# Patient Record
Sex: FEMALE | Race: WHITE | Employment: OTHER | URBAN - METROPOLITAN AREA
[De-identification: names, ages, dates, MRNs, and addresses within clinical notes are randomized per-mention and may not be internally consistent; named-entity substitution may affect disease eponyms.]

---

## 2019-09-30 ENCOUNTER — HOSPITAL ENCOUNTER (EMERGENCY)
Age: 64
Discharge: HOME | End: 2019-09-30
Attending: EMERGENCY MEDICINE
Payer: MEDICARE

## 2019-09-30 ENCOUNTER — APPOINTMENT (OUTPATIENT)
Dept: CT IMAGING | Age: 64
End: 2019-09-30
Payer: MEDICARE

## 2019-09-30 VITALS
SYSTOLIC BLOOD PRESSURE: 120 MMHG | OXYGEN SATURATION: 97 % | DIASTOLIC BLOOD PRESSURE: 60 MMHG | RESPIRATION RATE: 14 BRPM | TEMPERATURE: 98.8 F | HEART RATE: 69 BPM

## 2019-09-30 DIAGNOSIS — N30.01 ACUTE CYSTITIS WITH HEMATURIA: ICD-10-CM

## 2019-09-30 DIAGNOSIS — R10.30 LOWER ABDOMINAL PAIN: Primary | ICD-10-CM

## 2019-09-30 DIAGNOSIS — K57.90 DIVERTICULOSIS: ICD-10-CM

## 2019-09-30 LAB
ALBUMIN SERPL-MCNC: 4.9 G/DL (ref 3.5–5.2)
ALP BLD-CCNC: 95 U/L (ref 35–104)
ALT SERPL-CCNC: 18 U/L (ref 0–32)
AMORPHOUS: ABNORMAL
ANION GAP SERPL CALCULATED.3IONS-SCNC: 11 MMOL/L (ref 7–16)
AST SERPL-CCNC: 20 U/L (ref 0–31)
BACTERIA: ABNORMAL /HPF
BASOPHILS ABSOLUTE: 0.06 E9/L (ref 0–0.2)
BASOPHILS RELATIVE PERCENT: 0.9 % (ref 0–2)
BILIRUB SERPL-MCNC: 0.6 MG/DL (ref 0–1.2)
BILIRUBIN URINE: NEGATIVE
BLOOD, URINE: ABNORMAL
BUN BLDV-MCNC: 17 MG/DL (ref 8–23)
CALCIUM SERPL-MCNC: 10 MG/DL (ref 8.6–10.2)
CHLORIDE BLD-SCNC: 103 MMOL/L (ref 98–107)
CLARITY: CLEAR
CO2: 25 MMOL/L (ref 22–29)
COLOR: YELLOW
CREAT SERPL-MCNC: 0.7 MG/DL (ref 0.5–1)
EOSINOPHILS ABSOLUTE: 0.09 E9/L (ref 0.05–0.5)
EOSINOPHILS RELATIVE PERCENT: 1.4 % (ref 0–6)
EPITHELIAL CELLS, UA: ABNORMAL /HPF
GFR AFRICAN AMERICAN: >60
GFR NON-AFRICAN AMERICAN: >60 ML/MIN/1.73
GLUCOSE BLD-MCNC: 101 MG/DL (ref 74–99)
GLUCOSE URINE: NEGATIVE MG/DL
HCT VFR BLD CALC: 44.2 % (ref 34–48)
HEMOGLOBIN: 14.6 G/DL (ref 11.5–15.5)
IMMATURE GRANULOCYTES #: 0.02 E9/L
IMMATURE GRANULOCYTES %: 0.3 % (ref 0–5)
KETONES, URINE: NEGATIVE MG/DL
LACTIC ACID: 1 MMOL/L (ref 0.5–2.2)
LEUKOCYTE ESTERASE, URINE: ABNORMAL
LIPASE: 20 U/L (ref 13–60)
LYMPHOCYTES ABSOLUTE: 1.58 E9/L (ref 1.5–4)
LYMPHOCYTES RELATIVE PERCENT: 25 % (ref 20–42)
MCH RBC QN AUTO: 29.9 PG (ref 26–35)
MCHC RBC AUTO-ENTMCNC: 33 % (ref 32–34.5)
MCV RBC AUTO: 90.4 FL (ref 80–99.9)
MONOCYTES ABSOLUTE: 0.55 E9/L (ref 0.1–0.95)
MONOCYTES RELATIVE PERCENT: 8.7 % (ref 2–12)
NEUTROPHILS ABSOLUTE: 4.03 E9/L (ref 1.8–7.3)
NEUTROPHILS RELATIVE PERCENT: 63.7 % (ref 43–80)
NITRITE, URINE: NEGATIVE
PDW BLD-RTO: 12.5 FL (ref 11.5–15)
PH UA: 5.5 (ref 5–9)
PLATELET # BLD: 193 E9/L (ref 130–450)
PMV BLD AUTO: 9.6 FL (ref 7–12)
POTASSIUM SERPL-SCNC: 4.1 MMOL/L (ref 3.5–5)
PROTEIN UA: NEGATIVE MG/DL
RBC # BLD: 4.89 E12/L (ref 3.5–5.5)
RBC UA: ABNORMAL /HPF (ref 0–2)
RENAL EPITHELIAL, UA: ABNORMAL /HPF
SODIUM BLD-SCNC: 139 MMOL/L (ref 132–146)
SPECIFIC GRAVITY UA: <=1.005 (ref 1–1.03)
TOTAL PROTEIN: 7.9 G/DL (ref 6.4–8.3)
UROBILINOGEN, URINE: 0.2 E.U./DL
WBC # BLD: 6.3 E9/L (ref 4.5–11.5)
WBC UA: ABNORMAL /HPF (ref 0–5)

## 2019-09-30 PROCEDURE — 6370000000 HC RX 637 (ALT 250 FOR IP): Performed by: PHYSICIAN ASSISTANT

## 2019-09-30 PROCEDURE — 74177 CT ABD & PELVIS W/CONTRAST: CPT

## 2019-09-30 PROCEDURE — 83605 ASSAY OF LACTIC ACID: CPT

## 2019-09-30 PROCEDURE — 87077 CULTURE AEROBIC IDENTIFY: CPT

## 2019-09-30 PROCEDURE — 99284 EMERGENCY DEPT VISIT MOD MDM: CPT

## 2019-09-30 PROCEDURE — 87186 SC STD MICRODIL/AGAR DIL: CPT

## 2019-09-30 PROCEDURE — 6360000004 HC RX CONTRAST MEDICATION: Performed by: RADIOLOGY

## 2019-09-30 PROCEDURE — 80053 COMPREHEN METABOLIC PANEL: CPT

## 2019-09-30 PROCEDURE — 2580000003 HC RX 258: Performed by: RADIOLOGY

## 2019-09-30 PROCEDURE — 83690 ASSAY OF LIPASE: CPT

## 2019-09-30 PROCEDURE — 85025 COMPLETE CBC W/AUTO DIFF WBC: CPT

## 2019-09-30 PROCEDURE — 81001 URINALYSIS AUTO W/SCOPE: CPT

## 2019-09-30 PROCEDURE — 87088 URINE BACTERIA CULTURE: CPT

## 2019-09-30 RX ORDER — SODIUM CHLORIDE 0.9 % (FLUSH) 0.9 %
10 SYRINGE (ML) INJECTION PRN
Status: COMPLETED | OUTPATIENT
Start: 2019-09-30 | End: 2019-09-30

## 2019-09-30 RX ORDER — CEPHALEXIN 500 MG/1
500 CAPSULE ORAL 3 TIMES DAILY
Qty: 21 CAPSULE | Refills: 0 | Status: SHIPPED | OUTPATIENT
Start: 2019-09-30 | End: 2019-10-07

## 2019-09-30 RX ORDER — KETOROLAC TROMETHAMINE 30 MG/ML
15 INJECTION, SOLUTION INTRAMUSCULAR; INTRAVENOUS ONCE
Status: DISCONTINUED | OUTPATIENT
Start: 2019-09-30 | End: 2019-09-30

## 2019-09-30 RX ORDER — METRONIDAZOLE 500 MG/1
500 TABLET ORAL 4 TIMES DAILY
Qty: 40 TABLET | Refills: 0 | Status: SHIPPED | OUTPATIENT
Start: 2019-09-30 | End: 2019-10-10

## 2019-09-30 RX ORDER — IBUPROFEN 800 MG/1
800 TABLET ORAL ONCE
Status: COMPLETED | OUTPATIENT
Start: 2019-09-30 | End: 2019-09-30

## 2019-09-30 RX ORDER — ONDANSETRON 4 MG/1
4 TABLET, ORALLY DISINTEGRATING ORAL EVERY 8 HOURS PRN
Qty: 20 TABLET | Refills: 0 | Status: SHIPPED | OUTPATIENT
Start: 2019-09-30 | End: 2019-10-07

## 2019-09-30 RX ADMIN — IBUPROFEN 800 MG: 800 TABLET, FILM COATED ORAL at 22:33

## 2019-09-30 RX ADMIN — IOPAMIDOL 110 ML: 755 INJECTION, SOLUTION INTRAVENOUS at 21:55

## 2019-09-30 RX ADMIN — Medication 10 ML: at 21:51

## 2019-09-30 ASSESSMENT — PAIN DESCRIPTION - LOCATION: LOCATION: ABDOMEN

## 2019-09-30 ASSESSMENT — PAIN SCALES - GENERAL
PAINLEVEL_OUTOF10: 7
PAINLEVEL_OUTOF10: 5

## 2019-09-30 ASSESSMENT — PAIN DESCRIPTION - PAIN TYPE: TYPE: ACUTE PAIN

## 2019-09-30 ASSESSMENT — PAIN DESCRIPTION - ORIENTATION: ORIENTATION: LEFT;LOWER

## 2019-10-02 LAB
ORGANISM: ABNORMAL
URINE CULTURE, ROUTINE: ABNORMAL

## 2019-10-29 ENCOUNTER — HOSPITAL ENCOUNTER (OUTPATIENT)
Age: 64
Discharge: HOME | End: 2019-10-31
Payer: MEDICARE

## 2019-10-29 PROCEDURE — 87077 CULTURE AEROBIC IDENTIFY: CPT

## 2019-10-29 PROCEDURE — 87186 SC STD MICRODIL/AGAR DIL: CPT

## 2019-10-29 PROCEDURE — 87088 URINE BACTERIA CULTURE: CPT

## 2019-10-30 ENCOUNTER — HOSPITAL ENCOUNTER (OUTPATIENT)
Age: 64
Discharge: HOME | End: 2019-11-01
Payer: MEDICARE

## 2019-10-30 PROCEDURE — 88112 CYTOPATH CELL ENHANCE TECH: CPT

## 2019-11-01 LAB
ORGANISM: ABNORMAL
URINE CULTURE, ROUTINE: ABNORMAL

## 2020-04-13 ENCOUNTER — HOSPITAL ENCOUNTER (OUTPATIENT)
Age: 65
Discharge: HOME | End: 2020-04-15
Payer: MEDICARE

## 2020-04-13 PROCEDURE — 87186 SC STD MICRODIL/AGAR DIL: CPT

## 2020-04-13 PROCEDURE — 87088 URINE BACTERIA CULTURE: CPT

## 2020-04-13 PROCEDURE — 87147 CULTURE TYPE IMMUNOLOGIC: CPT

## 2020-04-15 ENCOUNTER — HOSPITAL ENCOUNTER (EMERGENCY)
Age: 65
Discharge: HOME | End: 2020-04-15
Attending: EMERGENCY MEDICINE
Payer: MEDICARE

## 2020-04-15 VITALS
HEART RATE: 85 BPM | RESPIRATION RATE: 16 BRPM | TEMPERATURE: 98.8 F | BODY MASS INDEX: 23.95 KG/M2 | SYSTOLIC BLOOD PRESSURE: 156 MMHG | OXYGEN SATURATION: 98 % | DIASTOLIC BLOOD PRESSURE: 81 MMHG | WEIGHT: 149 LBS | HEIGHT: 66 IN

## 2020-04-15 LAB
ORGANISM: ABNORMAL
URINE CULTURE, ROUTINE: ABNORMAL
URINE CULTURE, ROUTINE: ABNORMAL

## 2020-04-15 PROCEDURE — 99283 EMERGENCY DEPT VISIT LOW MDM: CPT

## 2020-04-15 RX ORDER — AMOXICILLIN 500 MG/1
500 CAPSULE ORAL 3 TIMES DAILY
Qty: 21 CAPSULE | Refills: 0 | Status: SHIPPED | OUTPATIENT
Start: 2020-04-15 | End: 2020-04-22

## 2020-04-15 ASSESSMENT — ENCOUNTER SYMPTOMS
VOMITING: 0
COUGH: 0
SHORTNESS OF BREATH: 0
WHEEZING: 0
ABDOMINAL PAIN: 1
NAUSEA: 0
DIARRHEA: 0
CONSTIPATION: 0
COLOR CHANGE: 0

## 2020-04-15 NOTE — ED PROVIDER NOTES
distress. Appearance: She is well-developed. She is not diaphoretic. HENT:      Head: Normocephalic and atraumatic. Right Ear: External ear normal.      Left Ear: External ear normal.      Mouth/Throat:      Pharynx: No oropharyngeal exudate. Eyes:      Pupils: Pupils are equal, round, and reactive to light. Neck:      Musculoskeletal: Normal range of motion. Cardiovascular:      Rate and Rhythm: Normal rate and regular rhythm. Heart sounds: Normal heart sounds. No murmur. No friction rub. No gallop. Pulmonary:      Effort: Pulmonary effort is normal. No respiratory distress. Breath sounds: Normal breath sounds. No wheezing or rales. Chest:      Chest wall: No tenderness. Abdominal:      General: Bowel sounds are normal. There is no distension. Palpations: Abdomen is soft. There is no mass. Tenderness: There is no abdominal tenderness. There is no guarding or rebound. Hernia: No hernia is present. Musculoskeletal: Normal range of motion. General: No tenderness or deformity. Lymphadenopathy:      Cervical: No cervical adenopathy. Skin:     General: Skin is warm and dry. Capillary Refill: Capillary refill takes less than 2 seconds. Coloration: Skin is not pale. Findings: No erythema or rash. Neurological:      Mental Status: She is alert and oriented to person, place, and time. Cranial Nerves: No cranial nerve deficit. Psychiatric:         Behavior: Behavior normal.         Thought Content: Thought content normal.         Judgment: Judgment normal.          Procedures   --------------------------------------------- PAST HISTORY ---------------------------------------------  Past Medical History:  has a past medical history of Acid reflux, Allergic rhinitis, Asthma, Diverticulosis, Pinched nerve in neck, Staph aureus infection, and UTI (urinary tract infection).     Past Surgical History:  has a past surgical history that includes [KS]   4616 Discussed with Dr. Brian Zhao, who states that if patient is asymptomatic, that she does not need to be treated with abx, but if she is symptomatic to treat with amoxicillin. No need for a PICC line or additional labs if patient is not hypotensive or tachycardic. Patient to follow up with PCP and does not need additional ID consult at this time.      [KS]      ED Course User Index  [KS] Ale Snyder, [address] [facility],   Resident  04/15/20 [place] [address], DO  Resident  04/15/20 0016

## 2020-04-27 ENCOUNTER — APPOINTMENT (OUTPATIENT)
Dept: CT IMAGING | Age: 65
End: 2020-04-27
Payer: MEDICARE

## 2020-04-27 ENCOUNTER — HOSPITAL ENCOUNTER (EMERGENCY)
Age: 65
Discharge: HOME | End: 2020-04-27
Attending: EMERGENCY MEDICINE
Payer: MEDICARE

## 2020-04-27 VITALS
BODY MASS INDEX: 23.63 KG/M2 | RESPIRATION RATE: 16 BRPM | HEIGHT: 66 IN | TEMPERATURE: 98.3 F | SYSTOLIC BLOOD PRESSURE: 138 MMHG | DIASTOLIC BLOOD PRESSURE: 73 MMHG | HEART RATE: 70 BPM | WEIGHT: 147 LBS | OXYGEN SATURATION: 95 %

## 2020-04-27 LAB
ALBUMIN SERPL-MCNC: 4.6 G/DL (ref 3.5–5.2)
ALP BLD-CCNC: 95 U/L (ref 35–104)
ALT SERPL-CCNC: 25 U/L (ref 0–32)
ANION GAP SERPL CALCULATED.3IONS-SCNC: 13 MMOL/L (ref 7–16)
AST SERPL-CCNC: 24 U/L (ref 0–31)
BACTERIA: ABNORMAL /HPF
BASOPHILS ABSOLUTE: 0.06 E9/L (ref 0–0.2)
BASOPHILS RELATIVE PERCENT: 0.7 % (ref 0–2)
BILIRUB SERPL-MCNC: 0.4 MG/DL (ref 0–1.2)
BILIRUBIN URINE: NEGATIVE
BLOOD, URINE: NEGATIVE
BUN BLDV-MCNC: 16 MG/DL (ref 8–23)
CALCIUM SERPL-MCNC: 9.8 MG/DL (ref 8.6–10.2)
CHLORIDE BLD-SCNC: 101 MMOL/L (ref 98–107)
CLARITY: CLEAR
CO2: 24 MMOL/L (ref 22–29)
COLOR: YELLOW
CREAT SERPL-MCNC: 0.7 MG/DL (ref 0.5–1)
EOSINOPHILS ABSOLUTE: 0.1 E9/L (ref 0.05–0.5)
EOSINOPHILS RELATIVE PERCENT: 1.2 % (ref 0–6)
EPITHELIAL CELLS, UA: ABNORMAL /HPF
GFR AFRICAN AMERICAN: >60
GFR NON-AFRICAN AMERICAN: >60 ML/MIN/1.73
GLUCOSE BLD-MCNC: 97 MG/DL (ref 74–99)
GLUCOSE URINE: NEGATIVE MG/DL
HCT VFR BLD CALC: 44.9 % (ref 34–48)
HEMOGLOBIN: 14.9 G/DL (ref 11.5–15.5)
IMMATURE GRANULOCYTES #: 0.02 E9/L
IMMATURE GRANULOCYTES %: 0.2 % (ref 0–5)
KETONES, URINE: NEGATIVE MG/DL
LACTIC ACID, SEPSIS: 1.3 MMOL/L (ref 0.5–1.9)
LEUKOCYTE ESTERASE, URINE: ABNORMAL
LIPASE: 22 U/L (ref 13–60)
LYMPHOCYTES ABSOLUTE: 1.6 E9/L (ref 1.5–4)
LYMPHOCYTES RELATIVE PERCENT: 18.8 % (ref 20–42)
MCH RBC QN AUTO: 30 PG (ref 26–35)
MCHC RBC AUTO-ENTMCNC: 33.2 % (ref 32–34.5)
MCV RBC AUTO: 90.3 FL (ref 80–99.9)
MONOCYTES ABSOLUTE: 0.66 E9/L (ref 0.1–0.95)
MONOCYTES RELATIVE PERCENT: 7.7 % (ref 2–12)
NEUTROPHILS ABSOLUTE: 6.09 E9/L (ref 1.8–7.3)
NEUTROPHILS RELATIVE PERCENT: 71.4 % (ref 43–80)
NITRITE, URINE: NEGATIVE
PDW BLD-RTO: 12.8 FL (ref 11.5–15)
PH UA: 5.5 (ref 5–9)
PLATELET # BLD: 257 E9/L (ref 130–450)
PMV BLD AUTO: 9.6 FL (ref 7–12)
POTASSIUM SERPL-SCNC: 4.6 MMOL/L (ref 3.5–5)
PROTEIN UA: NEGATIVE MG/DL
RBC # BLD: 4.97 E12/L (ref 3.5–5.5)
RBC UA: ABNORMAL /HPF (ref 0–2)
SODIUM BLD-SCNC: 138 MMOL/L (ref 132–146)
SPECIFIC GRAVITY UA: <=1.005 (ref 1–1.03)
TOTAL PROTEIN: 7.8 G/DL (ref 6.4–8.3)
UROBILINOGEN, URINE: 0.2 E.U./DL
WBC # BLD: 8.5 E9/L (ref 4.5–11.5)
WBC UA: ABNORMAL /HPF (ref 0–5)

## 2020-04-27 PROCEDURE — 87186 SC STD MICRODIL/AGAR DIL: CPT

## 2020-04-27 PROCEDURE — 6360000002 HC RX W HCPCS

## 2020-04-27 PROCEDURE — 99284 EMERGENCY DEPT VISIT MOD MDM: CPT

## 2020-04-27 PROCEDURE — 96374 THER/PROPH/DIAG INJ IV PUSH: CPT

## 2020-04-27 PROCEDURE — 83690 ASSAY OF LIPASE: CPT

## 2020-04-27 PROCEDURE — 81001 URINALYSIS AUTO W/SCOPE: CPT

## 2020-04-27 PROCEDURE — 6360000004 HC RX CONTRAST MEDICATION: Performed by: RADIOLOGY

## 2020-04-27 PROCEDURE — 87077 CULTURE AEROBIC IDENTIFY: CPT

## 2020-04-27 PROCEDURE — 87040 BLOOD CULTURE FOR BACTERIA: CPT

## 2020-04-27 PROCEDURE — 2580000003 HC RX 258: Performed by: STUDENT IN AN ORGANIZED HEALTH CARE EDUCATION/TRAINING PROGRAM

## 2020-04-27 PROCEDURE — 83605 ASSAY OF LACTIC ACID: CPT

## 2020-04-27 PROCEDURE — 87147 CULTURE TYPE IMMUNOLOGIC: CPT

## 2020-04-27 PROCEDURE — 6370000000 HC RX 637 (ALT 250 FOR IP): Performed by: EMERGENCY MEDICINE

## 2020-04-27 PROCEDURE — 85025 COMPLETE CBC W/AUTO DIFF WBC: CPT

## 2020-04-27 PROCEDURE — 87088 URINE BACTERIA CULTURE: CPT

## 2020-04-27 PROCEDURE — 74177 CT ABD & PELVIS W/CONTRAST: CPT

## 2020-04-27 PROCEDURE — 80053 COMPREHEN METABOLIC PANEL: CPT

## 2020-04-27 RX ORDER — CEFDINIR 300 MG/1
300 CAPSULE ORAL ONCE
Status: COMPLETED | OUTPATIENT
Start: 2020-04-27 | End: 2020-04-27

## 2020-04-27 RX ORDER — KETOROLAC TROMETHAMINE 30 MG/ML
15 INJECTION, SOLUTION INTRAMUSCULAR; INTRAVENOUS ONCE
Status: COMPLETED | OUTPATIENT
Start: 2020-04-27 | End: 2020-04-27

## 2020-04-27 RX ORDER — 0.9 % SODIUM CHLORIDE 0.9 %
1000 INTRAVENOUS SOLUTION INTRAVENOUS ONCE
Status: COMPLETED | OUTPATIENT
Start: 2020-04-27 | End: 2020-04-27

## 2020-04-27 RX ORDER — KETOROLAC TROMETHAMINE 30 MG/ML
INJECTION, SOLUTION INTRAMUSCULAR; INTRAVENOUS
Status: COMPLETED
Start: 2020-04-27 | End: 2020-04-27

## 2020-04-27 RX ORDER — CEFDINIR 300 MG/1
300 CAPSULE ORAL 2 TIMES DAILY
Qty: 20 CAPSULE | Refills: 0 | Status: ON HOLD | OUTPATIENT
Start: 2020-04-27 | End: 2020-05-04 | Stop reason: HOSPADM

## 2020-04-27 RX ORDER — FENTANYL CITRATE 50 UG/ML
50 INJECTION, SOLUTION INTRAMUSCULAR; INTRAVENOUS ONCE
Status: DISCONTINUED | OUTPATIENT
Start: 2020-04-27 | End: 2020-04-27

## 2020-04-27 RX ADMIN — IOPAMIDOL 110 ML: 755 INJECTION, SOLUTION INTRAVENOUS at 13:52

## 2020-04-27 RX ADMIN — KETOROLAC TROMETHAMINE 15 MG: 30 INJECTION, SOLUTION INTRAMUSCULAR; INTRAVENOUS at 14:51

## 2020-04-27 RX ADMIN — CEFDINIR 300 MG: 300 CAPSULE ORAL at 16:12

## 2020-04-27 RX ADMIN — KETOROLAC TROMETHAMINE 15 MG: 30 INJECTION, SOLUTION INTRAMUSCULAR at 14:51

## 2020-04-27 RX ADMIN — SODIUM CHLORIDE 1000 ML: 9 INJECTION, SOLUTION INTRAVENOUS at 12:41

## 2020-04-27 ASSESSMENT — PAIN DESCRIPTION - PAIN TYPE: TYPE: ACUTE PAIN

## 2020-04-27 ASSESSMENT — ENCOUNTER SYMPTOMS
DIARRHEA: 0
COUGH: 0
CONSTIPATION: 0
BACK PAIN: 0
RHINORRHEA: 0
WHEEZING: 0
VOMITING: 0
CHEST TIGHTNESS: 0
ABDOMINAL PAIN: 1
SHORTNESS OF BREATH: 0
BLOOD IN STOOL: 0
NAUSEA: 0
SORE THROAT: 0

## 2020-04-27 ASSESSMENT — PAIN SCALES - GENERAL
PAINLEVEL_OUTOF10: 7
PAINLEVEL_OUTOF10: 6
PAINLEVEL_OUTOF10: 8

## 2020-04-27 ASSESSMENT — PAIN DESCRIPTION - LOCATION: LOCATION: ABDOMEN

## 2020-04-27 NOTE — ED PROVIDER NOTES
appearance. She is normal weight. She is not ill-appearing, toxic-appearing or diaphoretic. HENT:      Head: Normocephalic and atraumatic. Right Ear: External ear normal.      Left Ear: External ear normal.      Nose: Nose normal. No congestion or rhinorrhea. Mouth/Throat:      Mouth: Mucous membranes are moist.      Pharynx: No oropharyngeal exudate or posterior oropharyngeal erythema. Eyes:      General: No scleral icterus. Right eye: No discharge. Left eye: No discharge. Extraocular Movements: Extraocular movements intact. Pupils: Pupils are equal, round, and reactive to light. Neck:      Musculoskeletal: Normal range of motion and neck supple. No neck rigidity or muscular tenderness. Vascular: No carotid bruit. Cardiovascular:      Rate and Rhythm: Normal rate and regular rhythm. Pulses: Normal pulses. Heart sounds: Normal heart sounds. No murmur. No friction rub. No gallop. Pulmonary:      Effort: Pulmonary effort is normal. No respiratory distress. Breath sounds: Normal breath sounds. No stridor. No wheezing, rhonchi or rales. Chest:      Chest wall: No tenderness. Abdominal:      General: Abdomen is flat. There is no distension. Palpations: Abdomen is soft. There is no mass. Tenderness: There is abdominal tenderness. There is no right CVA tenderness, left CVA tenderness, guarding or rebound. Hernia: No hernia is present. Comments: Left lower quadrant tenderness on palpation. Suprapubic tenderness on palpation. Epigastric tenderness is mild. Musculoskeletal: Normal range of motion. General: No swelling, tenderness, deformity or signs of injury. Right lower leg: No edema. Left lower leg: No edema. Lymphadenopathy:      Cervical: No cervical adenopathy. Skin:     General: Skin is warm. Capillary Refill: Capillary refill takes less than 2 seconds. Findings: No rash.    Neurological: the hospital encounter of 04/27/20   CBC Auto Differential   Result Value Ref Range    WBC 8.5 4.5 - 11.5 E9/L    RBC 4.97 3.50 - 5.50 E12/L    Hemoglobin 14.9 11.5 - 15.5 g/dL    Hematocrit 44.9 34.0 - 48.0 %    MCV 90.3 80.0 - 99.9 fL    MCH 30.0 26.0 - 35.0 pg    MCHC 33.2 32.0 - 34.5 %    RDW 12.8 11.5 - 15.0 fL    Platelets 713 024 - 621 E9/L    MPV 9.6 7.0 - 12.0 fL    Neutrophils % 71.4 43.0 - 80.0 %    Immature Granulocytes % 0.2 0.0 - 5.0 %    Lymphocytes % 18.8 (L) 20.0 - 42.0 %    Monocytes % 7.7 2.0 - 12.0 %    Eosinophils % 1.2 0.0 - 6.0 %    Basophils % 0.7 0.0 - 2.0 %    Neutrophils Absolute 6.09 1.80 - 7.30 E9/L    Immature Granulocytes # 0.02 E9/L    Lymphocytes Absolute 1.60 1.50 - 4.00 E9/L    Monocytes Absolute 0.66 0.10 - 0.95 E9/L    Eosinophils Absolute 0.10 0.05 - 0.50 E9/L    Basophils Absolute 0.06 0.00 - 0.20 E9/L   Comprehensive Metabolic Panel   Result Value Ref Range    Sodium 138 132 - 146 mmol/L    Potassium 4.6 3.5 - 5.0 mmol/L    Chloride 101 98 - 107 mmol/L    CO2 24 22 - 29 mmol/L    Anion Gap 13 7 - 16 mmol/L    Glucose 97 74 - 99 mg/dL    BUN 16 8 - 23 mg/dL    CREATININE 0.7 0.5 - 1.0 mg/dL    GFR Non-African American >60 >=60 mL/min/1.73    GFR African American >60     Calcium 9.8 8.6 - 10.2 mg/dL    Total Protein 7.8 6.4 - 8.3 g/dL    Alb 4.6 3.5 - 5.2 g/dL    Total Bilirubin 0.4 0.0 - 1.2 mg/dL    Alkaline Phosphatase 95 35 - 104 U/L    ALT 25 0 - 32 U/L    AST 24 0 - 31 U/L   Urinalysis   Result Value Ref Range    Color, UA Yellow Straw/Yellow    Clarity, UA Clear Clear    Glucose, Ur Negative Negative mg/dL    Bilirubin Urine Negative Negative    Ketones, Urine Negative Negative mg/dL    Specific Gravity, UA <=1.005 1.005 - 1.030    Blood, Urine Negative Negative    pH, UA 5.5 5.0 - 9.0    Protein, UA Negative Negative mg/dL    Urobilinogen, Urine 0.2 <2.0 E.U./dL    Nitrite, Urine Negative Negative    Leukocyte Esterase, Urine LARGE (A) Negative   Lactate, Sepsis   Result Value Ref Range    Lactic Acid, Sepsis 1.3 0.5 - 1.9 mmol/L   Lipase   Result Value Ref Range    Lipase 22 13 - 60 U/L   Microscopic Urinalysis   Result Value Ref Range    WBC, UA 10-20 (A) 0 - 5 /HPF    RBC, UA 1-3 0 - 2 /HPF    Epithelial Cells, UA FEW /HPF    Bacteria, UA FEW (A) None Seen /HPF       Radiology:  CT ABDOMEN PELVIS W IV CONTRAST Additional Contrast? None   Final Result   There are multiple diverticula seen    Findings compatible with fatty infiltration of the liver                                        ------------------------- NURSING NOTES AND VITALS REVIEWED ---------------------------  Date / Time Roomed:  4/27/2020 11:43 AM  ED Bed Assignment:  25/25    The nursing notes within the ED encounter and vital signs as below have been reviewed. /73   Pulse 70   Temp 98.3 °F (36.8 °C) (Oral)   Resp 16   Ht 5' 6\" (1.676 m)   Wt 147 lb (66.7 kg)   SpO2 95%   BMI 23.73 kg/m²   Oxygen Saturation Interpretation: Normal      ------------------------------------------ PROGRESS NOTES ------------------------------------------  I have spoken with the patient and discussed todays results, in addition to providing specific details for the plan of care and counseling regarding the diagnosis and prognosis. Their questions are answered at this time and they are agreeable with the plan. I discussed at length with them reasons for immediate return here for re evaluation. They will followup with primary care by calling their office tomorrow. --------------------------------- ADDITIONAL PROVIDER NOTES ---------------------------------  At this time the patient is without objective evidence of an acute process requiring hospitalization or inpatient management. They have remained hemodynamically stable throughout their entire ED visit and are stable for discharge with outpatient follow-up. Discussed imaging and laboratories with the patient. Imaging is negative for acute pathology.   Does

## 2020-04-30 LAB
ORGANISM: ABNORMAL
ORGANISM: ABNORMAL
URINE CULTURE, ROUTINE: ABNORMAL
URINE CULTURE, ROUTINE: ABNORMAL

## 2020-05-02 ENCOUNTER — HOSPITAL ENCOUNTER (INPATIENT)
Age: 65
LOS: 2 days | Discharge: HOME | DRG: 690 | End: 2020-05-04
Attending: EMERGENCY MEDICINE | Admitting: INTERNAL MEDICINE
Payer: MEDICARE

## 2020-05-02 ENCOUNTER — APPOINTMENT (OUTPATIENT)
Dept: GENERAL RADIOLOGY | Age: 65
DRG: 690 | End: 2020-05-02
Payer: MEDICARE

## 2020-05-02 PROBLEM — E87.20 LACTIC ACIDOSIS: Status: ACTIVE | Noted: 2020-05-02

## 2020-05-02 PROBLEM — Z87.19 HISTORY OF GASTROESOPHAGEAL REFLUX (GERD): Chronic | Status: ACTIVE | Noted: 2020-05-02

## 2020-05-02 PROBLEM — N39.0 UTI (URINARY TRACT INFECTION): Status: ACTIVE | Noted: 2020-05-02

## 2020-05-02 PROBLEM — E87.20 METABOLIC ACIDOSIS, NORMAL ANION GAP (NAG): Status: ACTIVE | Noted: 2020-05-02

## 2020-05-02 PROBLEM — K57.90 DIVERTICULOSIS: Chronic | Status: ACTIVE | Noted: 2020-05-02

## 2020-05-02 PROBLEM — J45.909 EXTRINSIC ASTHMA WITHOUT COMPLICATION: Chronic | Status: ACTIVE | Noted: 2020-05-02

## 2020-05-02 LAB
ALBUMIN SERPL-MCNC: 4.4 G/DL (ref 3.5–5.2)
ALP BLD-CCNC: 89 U/L (ref 35–104)
ALT SERPL-CCNC: 22 U/L (ref 0–32)
ANION GAP SERPL CALCULATED.3IONS-SCNC: 14 MMOL/L (ref 7–16)
AST SERPL-CCNC: 24 U/L (ref 0–31)
BACTERIA: ABNORMAL /HPF
BASOPHILS ABSOLUTE: 0.04 E9/L (ref 0–0.2)
BASOPHILS RELATIVE PERCENT: 0.6 % (ref 0–2)
BILIRUB SERPL-MCNC: 0.6 MG/DL (ref 0–1.2)
BILIRUBIN URINE: NEGATIVE
BLOOD CULTURE, ROUTINE: NORMAL
BLOOD, URINE: NEGATIVE
BUN BLDV-MCNC: 16 MG/DL (ref 8–23)
CALCIUM SERPL-MCNC: 9.7 MG/DL (ref 8.6–10.2)
CHLORIDE BLD-SCNC: 99 MMOL/L (ref 98–107)
CLARITY: CLEAR
CO2: 21 MMOL/L (ref 22–29)
COLOR: YELLOW
CREAT SERPL-MCNC: 0.7 MG/DL (ref 0.5–1)
CULTURE, BLOOD 2: NORMAL
EKG ATRIAL RATE: 73 BPM
EKG P-R INTERVAL: 168 MS
EKG Q-T INTERVAL: 366 MS
EKG QRS DURATION: 82 MS
EKG QTC CALCULATION (BAZETT): 403 MS
EKG R AXIS: 134 DEGREES
EKG T AXIS: 152 DEGREES
EKG VENTRICULAR RATE: 73 BPM
EOSINOPHILS ABSOLUTE: 0.07 E9/L (ref 0.05–0.5)
EOSINOPHILS RELATIVE PERCENT: 1.1 % (ref 0–6)
EPITHELIAL CELLS, UA: ABNORMAL /HPF
GFR AFRICAN AMERICAN: >60
GFR NON-AFRICAN AMERICAN: >60 ML/MIN/1.73
GLUCOSE BLD-MCNC: 95 MG/DL (ref 74–99)
GLUCOSE URINE: NEGATIVE MG/DL
HCT VFR BLD CALC: 43.9 % (ref 34–48)
HEMOGLOBIN: 14.8 G/DL (ref 11.5–15.5)
IMMATURE GRANULOCYTES #: 0.02 E9/L
IMMATURE GRANULOCYTES %: 0.3 % (ref 0–5)
KETONES, URINE: NEGATIVE MG/DL
LACTIC ACID, SEPSIS: 1.2 MMOL/L (ref 0.5–1.9)
LACTIC ACID, SEPSIS: 3 MMOL/L (ref 0.5–1.9)
LEUKOCYTE ESTERASE, URINE: ABNORMAL
LYMPHOCYTES ABSOLUTE: 1.23 E9/L (ref 1.5–4)
LYMPHOCYTES RELATIVE PERCENT: 19.6 % (ref 20–42)
MCH RBC QN AUTO: 30 PG (ref 26–35)
MCHC RBC AUTO-ENTMCNC: 33.7 % (ref 32–34.5)
MCV RBC AUTO: 88.9 FL (ref 80–99.9)
MONOCYTES ABSOLUTE: 0.55 E9/L (ref 0.1–0.95)
MONOCYTES RELATIVE PERCENT: 8.8 % (ref 2–12)
NEUTROPHILS ABSOLUTE: 4.35 E9/L (ref 1.8–7.3)
NEUTROPHILS RELATIVE PERCENT: 69.6 % (ref 43–80)
NITRITE, URINE: NEGATIVE
PDW BLD-RTO: 12.7 FL (ref 11.5–15)
PH UA: 6.5 (ref 5–9)
PLATELET # BLD: 230 E9/L (ref 130–450)
PMV BLD AUTO: 9.5 FL (ref 7–12)
POTASSIUM SERPL-SCNC: 4.4 MMOL/L (ref 3.5–5)
PROTEIN UA: NEGATIVE MG/DL
RBC # BLD: 4.94 E12/L (ref 3.5–5.5)
RBC UA: ABNORMAL /HPF (ref 0–2)
RENAL EPITHELIAL, UA: ABNORMAL /HPF
SODIUM BLD-SCNC: 134 MMOL/L (ref 132–146)
SPECIFIC GRAVITY UA: <=1.005 (ref 1–1.03)
TOTAL PROTEIN: 7.6 G/DL (ref 6.4–8.3)
UROBILINOGEN, URINE: 0.2 E.U./DL
WBC # BLD: 6.3 E9/L (ref 4.5–11.5)
WBC UA: ABNORMAL /HPF (ref 0–5)

## 2020-05-02 PROCEDURE — 2580000003 HC RX 258: Performed by: INTERNAL MEDICINE

## 2020-05-02 PROCEDURE — 93010 ELECTROCARDIOGRAM REPORT: CPT | Performed by: INTERNAL MEDICINE

## 2020-05-02 PROCEDURE — 81001 URINALYSIS AUTO W/SCOPE: CPT

## 2020-05-02 PROCEDURE — 93005 ELECTROCARDIOGRAM TRACING: CPT | Performed by: EMERGENCY MEDICINE

## 2020-05-02 PROCEDURE — 6370000000 HC RX 637 (ALT 250 FOR IP): Performed by: INTERNAL MEDICINE

## 2020-05-02 PROCEDURE — 83605 ASSAY OF LACTIC ACID: CPT

## 2020-05-02 PROCEDURE — 80053 COMPREHEN METABOLIC PANEL: CPT

## 2020-05-02 PROCEDURE — 99285 EMERGENCY DEPT VISIT HI MDM: CPT

## 2020-05-02 PROCEDURE — 1200000000 HC SEMI PRIVATE

## 2020-05-02 PROCEDURE — 71045 X-RAY EXAM CHEST 1 VIEW: CPT

## 2020-05-02 PROCEDURE — 85025 COMPLETE CBC W/AUTO DIFF WBC: CPT

## 2020-05-02 PROCEDURE — 87088 URINE BACTERIA CULTURE: CPT

## 2020-05-02 PROCEDURE — 2580000003 HC RX 258: Performed by: EMERGENCY MEDICINE

## 2020-05-02 PROCEDURE — 6360000002 HC RX W HCPCS: Performed by: EMERGENCY MEDICINE

## 2020-05-02 PROCEDURE — 87040 BLOOD CULTURE FOR BACTERIA: CPT

## 2020-05-02 RX ORDER — ONDANSETRON 2 MG/ML
4 INJECTION INTRAMUSCULAR; INTRAVENOUS EVERY 6 HOURS PRN
Status: DISCONTINUED | OUTPATIENT
Start: 2020-05-02 | End: 2020-05-04 | Stop reason: HOSPADM

## 2020-05-02 RX ORDER — 0.9 % SODIUM CHLORIDE 0.9 %
1000 INTRAVENOUS SOLUTION INTRAVENOUS ONCE
Status: COMPLETED | OUTPATIENT
Start: 2020-05-02 | End: 2020-05-02

## 2020-05-02 RX ORDER — M-VIT,TX,IRON,MINS/CALC/FOLIC 27MG-0.4MG
1 TABLET ORAL DAILY
Status: DISCONTINUED | OUTPATIENT
Start: 2020-05-02 | End: 2020-05-04 | Stop reason: HOSPADM

## 2020-05-02 RX ORDER — ASPIRIN 81 MG/1
81 TABLET ORAL EVERY OTHER DAY
Status: DISCONTINUED | OUTPATIENT
Start: 2020-05-02 | End: 2020-05-04 | Stop reason: HOSPADM

## 2020-05-02 RX ORDER — POLYETHYLENE GLYCOL 3350 17 G/17G
17 POWDER, FOR SOLUTION ORAL DAILY PRN
Status: DISCONTINUED | OUTPATIENT
Start: 2020-05-02 | End: 2020-05-04 | Stop reason: HOSPADM

## 2020-05-02 RX ORDER — PROMETHAZINE HYDROCHLORIDE 25 MG/1
12.5 TABLET ORAL EVERY 6 HOURS PRN
Status: DISCONTINUED | OUTPATIENT
Start: 2020-05-02 | End: 2020-05-04 | Stop reason: HOSPADM

## 2020-05-02 RX ORDER — LORAZEPAM 0.5 MG/1
0.5 TABLET ORAL NIGHTLY PRN
Status: DISCONTINUED | OUTPATIENT
Start: 2020-05-02 | End: 2020-05-04 | Stop reason: HOSPADM

## 2020-05-02 RX ORDER — SPIRONOLACTONE 25 MG/1
25 TABLET ORAL DAILY
Status: DISCONTINUED | OUTPATIENT
Start: 2020-05-02 | End: 2020-05-04 | Stop reason: HOSPADM

## 2020-05-02 RX ORDER — SODIUM CHLORIDE 9 MG/ML
INJECTION, SOLUTION INTRAVENOUS CONTINUOUS
Status: DISCONTINUED | OUTPATIENT
Start: 2020-05-02 | End: 2020-05-04 | Stop reason: HOSPADM

## 2020-05-02 RX ORDER — ACETAMINOPHEN 325 MG/1
650 TABLET ORAL EVERY 6 HOURS PRN
Status: DISCONTINUED | OUTPATIENT
Start: 2020-05-02 | End: 2020-05-04 | Stop reason: HOSPADM

## 2020-05-02 RX ORDER — SODIUM CHLORIDE 0.9 % (FLUSH) 0.9 %
10 SYRINGE (ML) INJECTION EVERY 12 HOURS SCHEDULED
Status: DISCONTINUED | OUTPATIENT
Start: 2020-05-02 | End: 2020-05-04 | Stop reason: HOSPADM

## 2020-05-02 RX ORDER — LACTOBACILLUS RHAMNOSUS GG 10B CELL
1 CAPSULE ORAL DAILY
Status: DISCONTINUED | OUTPATIENT
Start: 2020-05-02 | End: 2020-05-04 | Stop reason: HOSPADM

## 2020-05-02 RX ORDER — ACETAMINOPHEN 650 MG/1
650 SUPPOSITORY RECTAL EVERY 6 HOURS PRN
Status: DISCONTINUED | OUTPATIENT
Start: 2020-05-02 | End: 2020-05-04 | Stop reason: HOSPADM

## 2020-05-02 RX ORDER — PANTOPRAZOLE SODIUM 40 MG/1
40 TABLET, DELAYED RELEASE ORAL
Status: DISCONTINUED | OUTPATIENT
Start: 2020-05-03 | End: 2020-05-04 | Stop reason: HOSPADM

## 2020-05-02 RX ORDER — SODIUM CHLORIDE 0.9 % (FLUSH) 0.9 %
10 SYRINGE (ML) INJECTION PRN
Status: DISCONTINUED | OUTPATIENT
Start: 2020-05-02 | End: 2020-05-04 | Stop reason: HOSPADM

## 2020-05-02 RX ORDER — CETIRIZINE HYDROCHLORIDE 10 MG/1
5 TABLET ORAL DAILY
Status: DISCONTINUED | OUTPATIENT
Start: 2020-05-02 | End: 2020-05-04 | Stop reason: HOSPADM

## 2020-05-02 RX ADMIN — SODIUM CHLORIDE 1000 ML: 9 INJECTION, SOLUTION INTRAVENOUS at 09:49

## 2020-05-02 RX ADMIN — SODIUM CHLORIDE 1000 ML: 9 INJECTION, SOLUTION INTRAVENOUS at 09:09

## 2020-05-02 RX ADMIN — HYDROCORTISONE: 25 CREAM TOPICAL at 20:28

## 2020-05-02 RX ADMIN — WATER 1 G: 1 INJECTION INTRAMUSCULAR; INTRAVENOUS; SUBCUTANEOUS at 10:58

## 2020-05-02 RX ADMIN — SODIUM CHLORIDE: 9 INJECTION, SOLUTION INTRAVENOUS at 12:32

## 2020-05-02 RX ADMIN — SODIUM CHLORIDE, PRESERVATIVE FREE 10 ML: 5 INJECTION INTRAVENOUS at 12:31

## 2020-05-02 ASSESSMENT — PAIN SCALES - GENERAL
PAINLEVEL_OUTOF10: 6
PAINLEVEL_OUTOF10: 0

## 2020-05-02 ASSESSMENT — ENCOUNTER SYMPTOMS
EYE PAIN: 0
SINUS PRESSURE: 0
SHORTNESS OF BREATH: 0
EYE DISCHARGE: 0
EYE REDNESS: 0
NAUSEA: 0
WHEEZING: 0
BACK PAIN: 0
VOMITING: 0
COUGH: 0
SORE THROAT: 0
ABDOMINAL DISTENTION: 0
DIARRHEA: 0

## 2020-05-02 NOTE — H&P
[facility] History and Physical    Admission Date  5/2/2020  8:31 AM  Chief Complaint Urinary tract infection, weakness  Admit Dx   UTI (urinary tract infection) [N39.0]    Subjective  History of Present Illness  5/3 Michael Akins is a 67F w PMH GERD, allergy-induced asthma, diverticulosis who presented to the ED on 5/2/2020 with complaints of chills, generalized weakness, and urinary frequency. Had been treated for UTI with both amoxicillin and then Omnicef over approximately the past two weeks, though despite this had continued urinary frequency and also developed chills. Here, vitals showed initial tachycardia w , now in the 70s; vitals are otherwise stable on room air. Labs fail to demonstrate any leukocytosis, anemia, electrolyte abnormalities. Lactic acidosis at 3.0 is present as well as mild metabolic acidosis w CO2 21 likely driven by the lactic acid. Urinalysis showed moderate LE, no nitrites, few bacteria. CXR clear and EKG not performed. No acute overnight events, vitals stable on room air without fevers overnight. Labs remain stable without leukocytosis. Urine cx sent yesterday AM shows no growth. Pt continues on IV Rocephin daily. Pt states she was having shaking chills on admission though now resolved. Admits to some mild dysuria but more prominent suprapubic discomfort she describes as burning as well as LLQ discomfort that she claims is usually present when she has some type of infection. She is hesitant to ascribe this to a possible bout of diverticulitis as her bowel movements are normal in frequency and consistency. Due to the prolonged nature of her current issue (since ~March 25) and the fact that she has been on now three different antibiotics prior to admission, will request ID for recommendations. Also follows with Dr. Luz Tian from urologic standpoint, but will defer uro consult at this time.      Informant(s) for H&P: Patient    Review of Systems - 12-point review of systems has been reviewed and is otherwise negative except as listed in the HPI    Past Medical History  Past Medical History:   Diagnosis Date    Acid reflux     Allergic rhinitis     Asthma     ALLERY INDUCED    Diverticulosis     Irritable bowel     Pinched nerve in neck     resolved    Staph aureus infection     UTI (urinary tract infection) 06/20/2015    resolved     Past Surgical History  Past Surgical History:   Procedure Laterality Date    BREAST SURGERY      cyst    CHOLECYSTECTOMY  07/14/2015    laparoscopic    CYST REMOVAL      behind right ear    DENTAL SURGERY      HERNIA REPAIR      MANDIBLE SURGERY      NASAL FRACTURE SURGERY  1975    NOSE SURGERY  1976    TONSILLECTOMY      TUBAL LIGATION       Medications Prior to Admission  Prior to Admission medications    Medication Sig Start Date End Date Taking? Authorizing Provider   cefdinir (OMNICEF) 300 MG capsule Take 1 capsule by mouth 2 times daily for 10 days 4/27/20 5/7/20 Yes Byron Gilliland Pravin,    aspirin 81 MG tablet Take 81 mg by mouth every other day   Yes Historical Provider, MD   Multiple Vitamins-Minerals (WOMENS MULTIVITAMIN PO) Take by mouth   Yes Historical Provider, MD   hydrocortisone (ANUSOL-HC) 2.5 % rectal cream Place rectally 2 times daily. 5/20/19  Yes Thelma Fajardo MD   Lactobacillus (ULTIMATE PROBIOTIC FORMULA) CAPS Take 1 capsule by mouth daily   Yes Historical Provider, MD   loratadine (CLARITIN) 10 MG tablet Take 5 mg by mouth daily   Yes Historical Provider, MD   esomeprazole (NEXIUM) 20 MG CPDR Take 1 capsule by mouth daily   Yes Historical Provider, MD   spironolactone (ALDACTONE) 25 MG tablet Take 25 mg by mouth daily    Yes Historical Provider, MD   LORazepam (ATIVAN) 0.5 MG tablet Take 1 tablet by mouth nightly as needed for Anxiety for up to 30 days. Patient taking differently: Take 0.25 mg by mouth nightly as needed for Anxiety.   4/24/20 5/24/20  Thelma Fajardo MD Ibuprofen-diphenhydrAMINE HCl (ADVIL PM) 200-25 MG CAPS Take 2 tablets by mouth as needed    Historical Provider, MD     Allergies  Lipitor [atorvastatin]; Bentyl [dicyclomine hcl]; Chlorhexidine gluconate; Ciprofloxacin; Codeine; Crestor [rosuvastatin]; Entex [ami-eladio]; Florastor [yeast]; Levaquin [levofloxacin in d5w]; Levsin [hyoscyamine sulfate]; Librax [chlordiazepoxide-clidinium]; Morphine; Reglan [metoclopramide]; Septra [bactrim]; Singulair [montelukast sodium]; Xyzal [levocetirizine]; Zetia [ezetimibe]; Adhesive tape; Doxycycline calcium; Oxytetracycline; Sumycin [tetracycline hcl]; and Tetracyclines & related     Social History   reports that she has quit smoking. She has a 10.00 pack-year smoking history. She has never used smokeless tobacco. She reports current alcohol use. She reports that she does not use drugs. Family History  family history includes Alzheimer's Disease in her father; Cirrhosis in her mother.      Objective  Physical Exam  Vitals: /68   Pulse 70   Temp 98.5 °F (36.9 °C) (Oral)   Resp 16   Ht 5' 6\" (1.676 m)   Wt 147 lb 1.6 oz (66.7 kg)   SpO2 96%   BMI 23.74 kg/m²   General: well-developed, well-nourished, no acute distress, cooperative  Skin: warm, dry, intact, normal color without cyanosis  HEENT: normocephalic, atraumatic, mucous membranes normal  Respiratory: clear to auscultation bilaterally without respiratory distress  Cardiovascular: regular rate and rhythm without murmur / rub / gallop  Abdominal: soft, nontender, nondistended, normoactive bowel sounds  Extremities: no mottling, pulses intact, no edema  Neurologic: awake, alert, no focal deficits  Psychiatric: normal affect, cooperative    Labs  Recent Labs     05/02/20  0853 05/03/20  0350    144   K 4.4 4.4   CO2 21* 26   BUN 16 12   CREATININE 0.7 0.7   GLUCOSE 95 96   CALCIUM 9.7 9.3   WBC 6.3 6.3   RBC 4.94 4.62   HGB 14.8 14.0   HCT 43.9 42.1    211     Radiology  XR CHEST PORTABLE   Final Result      NO ACUTE CARDIOPULMONARY PROCESS           Assessment / Plan  #1 UTI   Does not meet septic criteria   UA showing mod LE only   Follow cx, CBC   Pt on IV Rocephin daily; continue for now and follow cx   ID consult requested - checking inflammatory markers for now    #2 metabolic acidosis w borderline gap due to lactic acidosis, now resolved   Lactic 3.0 in ED, CO2 21, gap 14   IVF - got 1 L NS bolus x2 in ED   Follow - normal on 5/3    Remainder of the patient's chronic medical history includes GERD, allergy-induced asthma, diverticulosis; for these, continue home medications, monitor routine vitals and labs, with further changes as needed based on the patient's clinical course.     Code status  full  DVT prophylaxis Lovenox  Disposition  home    Electronically signed by Angelica Bartlett DO on 5/3/2020 at 12:01 PM

## 2020-05-02 NOTE — ED NOTES
Patient states she is unable to give urine sample right now. Will recheck in 15 minutes.       Sherie Wall RN  05/02/20 2968

## 2020-05-02 NOTE — ED PROVIDER NOTES
49-year-old female who presents for evaluation of chills and generalized weakness and urinary frequency. Patient reports she is been treated for UTIs over the past 2 weeks initially with amoxicillin and then with Omnicef. She reports she is being treated for a strep infection in her urine. She reports she developed shaking chills from the inside out that feels similar to when she had osteomyelitis of her jaw couple years ago. She denies any recent jaw pain or dental procedures which provoked her previous episode of osteomyelitis she complains of chronic left lower quadrant pain that no one can find a source of. She reports she continues to have urinary frequency despite the antibiotics. She has consulted Dr. Lila Santo of urology as well as ID in the past.  There was apparently some talk several weeks ago regarding a PICC line but ID was called 2 weeks ago on her presentation here and they recommended oral amoxicillin. Review of Systems   Constitutional: Negative for chills and fever. HENT: Negative for ear pain, sinus pressure and sore throat. Eyes: Negative for pain, discharge and redness. Respiratory: Negative for cough, shortness of breath and wheezing. Cardiovascular: Negative for chest pain. Gastrointestinal: Negative for abdominal distention, diarrhea, nausea and vomiting. Genitourinary: Positive for frequency. Negative for dysuria. Musculoskeletal: Negative for arthralgias and back pain. Skin: Negative for rash and wound. Neurological: Negative for weakness and headaches. Hematological: Negative for adenopathy. All other systems reviewed and are negative. Physical Exam  Vitals signs and nursing note reviewed. Constitutional:       Appearance: She is well-developed. HENT:      Head: Normocephalic and atraumatic. Eyes:      Conjunctiva/sclera: Conjunctivae normal.   Neck:      Musculoskeletal: Normal range of motion and neck supple.    Cardiovascular: labs, and imaging. Patient continues to have a UTI despite 2 rounds of outpatient antibiotics. Although her urine cultures were sensitive to MCALLISTER NAYA and should have been treated she continues to have symptoms and a urinary tract infection and also with a lactic acidosis. Patient given dose of Rocephin and IV fluids admitted for further evaluation and treatment          Counseling: The emergency provider has spoken with the patient and discussed todays results, in addition to providing specific details for the plan of care and counseling regarding the diagnosis and prognosis. Questions are answered at this time and they are agreeable with the plan.    --------------------------------- IMPRESSION AND DISPOSITION ---------------------------------    IMPRESSION  1. Urinary tract infection without hematuria, site unspecified    2. Lactic acidosis        DISPOSITION  Disposition: Admit to med/surg floor  Patient condition is stable    New Prescriptions    No medications on file       NOTE: This report was transcribed using voice recognition software.  Every effort was made to ensure accuracy; however, inadvertent computerized transcription errors may be present           Haleigh Lindsay DO  Resident  05/02/20 9008

## 2020-05-03 PROBLEM — E87.20 LACTIC ACIDOSIS: Status: RESOLVED | Noted: 2020-05-02 | Resolved: 2020-05-03

## 2020-05-03 PROBLEM — E87.20 METABOLIC ACIDOSIS, NORMAL ANION GAP (NAG): Status: RESOLVED | Noted: 2020-05-02 | Resolved: 2020-05-03

## 2020-05-03 LAB
ANION GAP SERPL CALCULATED.3IONS-SCNC: 9 MMOL/L (ref 7–16)
BUN BLDV-MCNC: 12 MG/DL (ref 8–23)
CALCIUM SERPL-MCNC: 9.3 MG/DL (ref 8.6–10.2)
CHLORIDE BLD-SCNC: 109 MMOL/L (ref 98–107)
CO2: 26 MMOL/L (ref 22–29)
CREAT SERPL-MCNC: 0.7 MG/DL (ref 0.5–1)
GFR AFRICAN AMERICAN: >60
GFR NON-AFRICAN AMERICAN: >60 ML/MIN/1.73
GLUCOSE BLD-MCNC: 96 MG/DL (ref 74–99)
HCT VFR BLD CALC: 42.1 % (ref 34–48)
HEMOGLOBIN: 14 G/DL (ref 11.5–15.5)
MCH RBC QN AUTO: 30.3 PG (ref 26–35)
MCHC RBC AUTO-ENTMCNC: 33.3 % (ref 32–34.5)
MCV RBC AUTO: 91.1 FL (ref 80–99.9)
PDW BLD-RTO: 13 FL (ref 11.5–15)
PLATELET # BLD: 211 E9/L (ref 130–450)
PMV BLD AUTO: 9.6 FL (ref 7–12)
POTASSIUM SERPL-SCNC: 4.4 MMOL/L (ref 3.5–5)
RBC # BLD: 4.62 E12/L (ref 3.5–5.5)
SODIUM BLD-SCNC: 144 MMOL/L (ref 132–146)
WBC # BLD: 6.3 E9/L (ref 4.5–11.5)

## 2020-05-03 PROCEDURE — 6370000000 HC RX 637 (ALT 250 FOR IP): Performed by: INTERNAL MEDICINE

## 2020-05-03 PROCEDURE — 80048 BASIC METABOLIC PNL TOTAL CA: CPT

## 2020-05-03 PROCEDURE — 99223 1ST HOSP IP/OBS HIGH 75: CPT | Performed by: INTERNAL MEDICINE

## 2020-05-03 PROCEDURE — 36415 COLL VENOUS BLD VENIPUNCTURE: CPT

## 2020-05-03 PROCEDURE — 2580000003 HC RX 258: Performed by: INTERNAL MEDICINE

## 2020-05-03 PROCEDURE — 85027 COMPLETE CBC AUTOMATED: CPT

## 2020-05-03 PROCEDURE — 1200000000 HC SEMI PRIVATE

## 2020-05-03 PROCEDURE — 6360000002 HC RX W HCPCS: Performed by: INTERNAL MEDICINE

## 2020-05-03 RX ADMIN — HYDROCORTISONE: 25 CREAM TOPICAL at 08:37

## 2020-05-03 RX ADMIN — CETIRIZINE HYDROCHLORIDE 5 MG: 10 TABLET, FILM COATED ORAL at 08:31

## 2020-05-03 RX ADMIN — Medication 1 TABLET: at 08:31

## 2020-05-03 RX ADMIN — ACETAMINOPHEN 650 MG: 325 TABLET ORAL at 17:52

## 2020-05-03 RX ADMIN — ACETAMINOPHEN 650 MG: 325 TABLET ORAL at 01:53

## 2020-05-03 RX ADMIN — SODIUM CHLORIDE: 9 INJECTION, SOLUTION INTRAVENOUS at 17:42

## 2020-05-03 RX ADMIN — WATER 1 G: 1 INJECTION INTRAMUSCULAR; INTRAVENOUS; SUBCUTANEOUS at 11:37

## 2020-05-03 RX ADMIN — SODIUM CHLORIDE: 9 INJECTION, SOLUTION INTRAVENOUS at 04:43

## 2020-05-03 RX ADMIN — SODIUM CHLORIDE, PRESERVATIVE FREE 10 ML: 5 INJECTION INTRAVENOUS at 08:31

## 2020-05-03 RX ADMIN — SPIRONOLACTONE 25 MG: 25 TABLET ORAL at 08:31

## 2020-05-03 RX ADMIN — PANTOPRAZOLE SODIUM 40 MG: 40 TABLET, DELAYED RELEASE ORAL at 06:23

## 2020-05-03 RX ADMIN — Medication 1 CAPSULE: at 08:31

## 2020-05-03 ASSESSMENT — PAIN DESCRIPTION - ONSET
ONSET: SUDDEN
ONSET: SUDDEN
ONSET: GRADUAL

## 2020-05-03 ASSESSMENT — PAIN DESCRIPTION - DESCRIPTORS
DESCRIPTORS: HEADACHE
DESCRIPTORS: HEADACHE
DESCRIPTORS: PRESSURE

## 2020-05-03 ASSESSMENT — PAIN - FUNCTIONAL ASSESSMENT
PAIN_FUNCTIONAL_ASSESSMENT: ACTIVITIES ARE NOT PREVENTED

## 2020-05-03 ASSESSMENT — PAIN SCALES - GENERAL
PAINLEVEL_OUTOF10: 6
PAINLEVEL_OUTOF10: 0
PAINLEVEL_OUTOF10: 1
PAINLEVEL_OUTOF10: 5

## 2020-05-03 ASSESSMENT — PAIN DESCRIPTION - PAIN TYPE
TYPE: ACUTE PAIN

## 2020-05-03 ASSESSMENT — PAIN DESCRIPTION - FREQUENCY: FREQUENCY: INTERMITTENT

## 2020-05-03 ASSESSMENT — PAIN DESCRIPTION - PROGRESSION: CLINICAL_PROGRESSION: GRADUALLY WORSENING

## 2020-05-03 ASSESSMENT — PAIN DESCRIPTION - ORIENTATION: ORIENTATION: MID

## 2020-05-03 ASSESSMENT — PAIN DESCRIPTION - LOCATION
LOCATION: HEAD
LOCATION: HEAD
LOCATION: PELVIS

## 2020-05-03 NOTE — CONSULTS
[address] [place] [address] [facility]  Consultation Note     Admit Date: 5/2/2020  8:31 AM    Reason for Consult:   UTI    Attending Physician:  Sanford Walls DO    HISTORY OF PRESENT ILLNESS:             The history is obtained from extensive review of available past medical records. The patient is a 75 y.o. female who is known to the ID service. The patient says that she was treated by Dr. 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 There are no [facility] of [facility] on [facility]. The patient has been having some left lower quadrant burning sensation with some suprapubic discomfort. She has had a mesh that was placed in by the late Dr. Andrés Katz several years ago. She denies any burning on urination, urgency or frequency. The patient was seen urology for chronic cystitis. She had been prescribed Estrace but it was too expensive and burning so she discontinued it. She had a negative urinalysis and a negative urine culture back in January. She had been referred to the office for bacteriuria and had an appointment later this month with Dr. Devante Cruz. The patient had been treated for diverticulitis in the past by her primary care physician with Cephalexin and Metronidazole. Last treatment was in 2019. The patient was sent to the ED on 4/15/2020. She had had a urine culture which grew Streptococcus agalactiae and was sent for IV antibiotics. .  The patient had asymptomatic bacteriuria back then. Her vital signs were normal and had a normal white count. We advised that she be treated with Amoxicillin and follow-up in the office. The patient went back to the ED on 4/27/2020 with the same complaints, characterized by abdominal pain. She said that the amoxicillin had not relieved her symptoms. A new urine culture was done. She was discharged on Cefdinir at my recommendation.   Urine cultures grew Klebsiella pneumoniae and Streptococcus agalactiae. Patient comes back to the ED on 5/2/2020 with the same complaints. In addition to this she has been having shaking chills. She has checked her temperatures in the morning and they have been normal.  She does not think she is checked in the afternoon when she has the chills. She said that she was treated for osteomyelitis of the mandible couple of years ago by Dr. Gui Dietrich and is frustrated and thinks that no one he is paying attention to her symptoms. Her white count was normal and she was afebrile. She was treated with Ceftriaxone. ID was asked to see her in consultation. Past Medical History:        Diagnosis Date    Acid reflux     Allergic rhinitis     Asthma     ALLERY INDUCED    Diverticulosis     Irritable bowel     Pinched nerve in neck     resolved    Staph aureus infection     UTI (urinary tract infection) 06/20/2015    resolved     Past Surgical History:        Procedure Laterality Date    BREAST SURGERY      cyst    CHOLECYSTECTOMY  07/14/2015    laparoscopic    CYST REMOVAL      behind right ear    DENTAL SURGERY      HERNIA REPAIR      MANDIBLE SURGERY      NASAL FRACTURE SURGERY  1975    NOSE SURGERY  1976    TONSILLECTOMY      TUBAL LIGATION       Current Medications:   Scheduled Meds:   cefTRIAXone (ROCEPHIN) IV  1 g Intravenous Q24H    sodium chloride flush  10 mL Intravenous 2 times per day    enoxaparin  40 mg Subcutaneous Daily    spironolactone  25 mg Oral Daily    pantoprazole  40 mg Oral QAM AC    cetirizine  5 mg Oral Daily    lactobacillus  1 capsule Oral Daily    hydrocortisone   Topical BID    aspirin  81 mg Oral Every Other Day    therapeutic multivitamin-minerals  1 tablet Oral Daily     Continuous Infusions:   sodium chloride 75 mL/hr at 05/03/20 0443     PRN Meds:sodium chloride flush, acetaminophen **OR** acetaminophen, polyethylene glycol, promethazine **OR** ondansetron, LORazepam    Allergies:  Lipitor [atorvastatin];  Bentyl complaint. REVIEW OF SYSTEMS:    Constitutional: Chills. No fever. Neurologic: Negative   Psychiatric: Negative  Rheumatologic: Negative   Endocrine: Negative  Hematologic: Negative  Immunologic: Negative  ENT: Negative  Respiratory: Negative   Cardiovascular: Negative  GI: As in the HPI  : As in the HPI  Musculoskeletal: Negative  Skin: No rashes. PHYSICAL EXAM:    Vitals:   /68   Pulse 70   Temp 98.5 °F (36.9 °C) (Oral)   Resp 16   Ht 5' 6\" (1.676 m)   Wt 147 lb 1.6 oz (66.7 kg)   SpO2 96%   BMI 23.74 kg/m²   Constitutional: The patient is awake, alert, and oriented. Skin: Warm and dry. No rashes were noted. HEENT: Eyes show round, and reactive pupils. No jaundice. Moist mucous membranes, no ulcerations, no thrush. Neck: Supple to movements. No lymphadenopathy. Chest: No use of accessory muscles to breathe. Symmetrical expansion. Auscultation reveals no wheezing, crackles, or rhonchi. Cardiovascular: S1 and S2 are rhythmic and regular. No murmurs appreciated. Abdomen: Positive bowel sounds to auscultation. Benign to palpation. No masses felt. No hepatosplenomegaly. Extremities: No clubbing, no cyanosis, no edema.   Lines: peripheral      CBC+dif:  Recent Labs     05/02/20  0853 05/03/20  0350   WBC 6.3 6.3   HGB 14.8 14.0   HCT 43.9 42.1   MCV 88.9 91.1    211   NEUTROABS 4.35  --      Lab Results   Component Value Date    CRP 0.1 11/07/2017    CRP <0.1 06/19/2017    CRP <0.1 06/05/2017      No results found for: CRP  Lab Results   Component Value Date    SEDRATE 6 11/07/2017    SEDRATE 8 06/19/2017    SEDRATE 3 06/05/2017     Lab Results   Component Value Date    ALT 22 05/02/2020    AST 24 05/02/2020    ALKPHOS 89 05/02/2020    BILITOT 0.6 05/02/2020     Lab Results   Component Value Date     05/03/2020    K 4.4 05/03/2020     05/03/2020    CO2 26 05/03/2020    BUN 12 05/03/2020    CREATININE 0.7 05/03/2020    GFRAA >60 05/03/2020    LABGLOM >60 05/03/2020

## 2020-05-04 VITALS
TEMPERATURE: 98 F | OXYGEN SATURATION: 97 % | WEIGHT: 145.6 LBS | RESPIRATION RATE: 16 BRPM | BODY MASS INDEX: 23.4 KG/M2 | DIASTOLIC BLOOD PRESSURE: 70 MMHG | SYSTOLIC BLOOD PRESSURE: 116 MMHG | HEART RATE: 61 BPM | HEIGHT: 66 IN

## 2020-05-04 LAB
ANION GAP SERPL CALCULATED.3IONS-SCNC: 11 MMOL/L (ref 7–16)
ANTISTREPTOLYSIN-O: <20 IU/ML (ref 0–200)
BASOPHILS ABSOLUTE: 0.06 E9/L (ref 0–0.2)
BASOPHILS RELATIVE PERCENT: 1 % (ref 0–2)
BUN BLDV-MCNC: 11 MG/DL (ref 8–23)
C-REACTIVE PROTEIN: <0.1 MG/DL (ref 0–0.4)
CALCIUM SERPL-MCNC: 9.1 MG/DL (ref 8.6–10.2)
CHLORIDE BLD-SCNC: 106 MMOL/L (ref 98–107)
CO2: 23 MMOL/L (ref 22–29)
CREAT SERPL-MCNC: 0.6 MG/DL (ref 0.5–1)
EOSINOPHILS ABSOLUTE: 0.13 E9/L (ref 0.05–0.5)
EOSINOPHILS RELATIVE PERCENT: 2.1 % (ref 0–6)
GFR AFRICAN AMERICAN: >60
GFR NON-AFRICAN AMERICAN: >60 ML/MIN/1.73
GLUCOSE BLD-MCNC: 90 MG/DL (ref 74–99)
HCT VFR BLD CALC: 41 % (ref 34–48)
HEMOGLOBIN: 13.6 G/DL (ref 11.5–15.5)
IMMATURE GRANULOCYTES #: 0.02 E9/L
IMMATURE GRANULOCYTES %: 0.3 % (ref 0–5)
LYMPHOCYTES ABSOLUTE: 2.05 E9/L (ref 1.5–4)
LYMPHOCYTES RELATIVE PERCENT: 33.1 % (ref 20–42)
MCH RBC QN AUTO: 30.3 PG (ref 26–35)
MCHC RBC AUTO-ENTMCNC: 33.2 % (ref 32–34.5)
MCV RBC AUTO: 91.3 FL (ref 80–99.9)
MONOCYTES ABSOLUTE: 0.53 E9/L (ref 0.1–0.95)
MONOCYTES RELATIVE PERCENT: 8.6 % (ref 2–12)
NEUTROPHILS ABSOLUTE: 3.4 E9/L (ref 1.8–7.3)
NEUTROPHILS RELATIVE PERCENT: 54.9 % (ref 43–80)
PDW BLD-RTO: 12.9 FL (ref 11.5–15)
PLATELET # BLD: 207 E9/L (ref 130–450)
PMV BLD AUTO: 9.6 FL (ref 7–12)
POTASSIUM SERPL-SCNC: 4.1 MMOL/L (ref 3.5–5)
RBC # BLD: 4.49 E12/L (ref 3.5–5.5)
SEDIMENTATION RATE, ERYTHROCYTE: 9 MM/HR (ref 0–20)
SODIUM BLD-SCNC: 140 MMOL/L (ref 132–146)
WBC # BLD: 6.2 E9/L (ref 4.5–11.5)

## 2020-05-04 PROCEDURE — 6370000000 HC RX 637 (ALT 250 FOR IP): Performed by: INTERNAL MEDICINE

## 2020-05-04 PROCEDURE — 99239 HOSP IP/OBS DSCHRG MGMT >30: CPT | Performed by: FAMILY MEDICINE

## 2020-05-04 PROCEDURE — 80048 BASIC METABOLIC PNL TOTAL CA: CPT

## 2020-05-04 PROCEDURE — 86140 C-REACTIVE PROTEIN: CPT

## 2020-05-04 PROCEDURE — 36415 COLL VENOUS BLD VENIPUNCTURE: CPT

## 2020-05-04 PROCEDURE — 6360000002 HC RX W HCPCS: Performed by: INTERNAL MEDICINE

## 2020-05-04 PROCEDURE — 85025 COMPLETE CBC W/AUTO DIFF WBC: CPT

## 2020-05-04 PROCEDURE — 2580000003 HC RX 258: Performed by: INTERNAL MEDICINE

## 2020-05-04 PROCEDURE — 6370000000 HC RX 637 (ALT 250 FOR IP): Performed by: OBSTETRICS & GYNECOLOGY

## 2020-05-04 PROCEDURE — 85651 RBC SED RATE NONAUTOMATED: CPT

## 2020-05-04 PROCEDURE — 86060 ANTISTREPTOLYSIN O TITER: CPT

## 2020-05-04 RX ADMIN — WATER 1 G: 1 INJECTION INTRAMUSCULAR; INTRAVENOUS; SUBCUTANEOUS at 12:33

## 2020-05-04 RX ADMIN — SODIUM CHLORIDE: 9 INJECTION, SOLUTION INTRAVENOUS at 06:23

## 2020-05-04 RX ADMIN — ACETAMINOPHEN 650 MG: 325 TABLET ORAL at 01:21

## 2020-05-04 RX ADMIN — ACETAMINOPHEN 650 MG: 325 TABLET ORAL at 07:34

## 2020-05-04 RX ADMIN — CETIRIZINE HYDROCHLORIDE 5 MG: 10 TABLET, FILM COATED ORAL at 07:35

## 2020-05-04 RX ADMIN — Medication 1 TABLET: at 07:35

## 2020-05-04 RX ADMIN — Medication 1 CAPSULE: at 07:35

## 2020-05-04 RX ADMIN — PANTOPRAZOLE SODIUM 40 MG: 40 TABLET, DELAYED RELEASE ORAL at 06:20

## 2020-05-04 RX ADMIN — ASPIRIN 81 MG: 81 TABLET, COATED ORAL at 07:34

## 2020-05-04 RX ADMIN — MICONAZOLE NITRATE 1 APPLICATOR: 20 CREAM VAGINAL at 12:33

## 2020-05-04 RX ADMIN — SPIRONOLACTONE 25 MG: 25 TABLET ORAL at 07:35

## 2020-05-04 ASSESSMENT — PAIN SCALES - GENERAL
PAINLEVEL_OUTOF10: 2
PAINLEVEL_OUTOF10: 4
PAINLEVEL_OUTOF10: 5

## 2020-05-04 NOTE — DISCHARGE SUMMARY
[facility] Physician Discharge Summary       Dillon Otto MD  [address] [facility] [address]          Daniel Bansal, [address] [facility]  Michael Barrera 56  [phone]      As needed    Noah Freeman DO  [address] [facility] [phone]      As needed      Activity level: As tolerated     Dispo:home      Condition on discharge: Stable     Patient ID:  Errol Linares  [id number]  65 y.o.  1955    Admit date: 5/2/2020    Discharge date and time:  5/4/2020  6:20 PM    Admission Diagnoses: Active Problems:    UTI (urinary tract infection)    History of gastroesophageal reflux (GERD)    Extrinsic asthma without complication    Diverticulosis  Resolved Problems:    Metabolic acidosis, normal anion gap (NAG)    Lactic acidosis      Discharge Diagnoses: Active Problems:    UTI (urinary tract infection)    History of gastroesophageal reflux (GERD)    Extrinsic asthma without complication    Diverticulosis  Resolved Problems:    Metabolic acidosis, normal anion gap (NAG)    Lactic acidosis      Consults:  IP CONSULT TO IV TEAM  IP CONSULT TO INFECTIOUS DISEASES  IP CONSULT TO OB GYN    Procedures: none    Hospital Course:   Patient Errol Linares is a 75 y.o. presented with UTI (urinary tract infection) [N39.0]  UTI (urinary tract infection) [N39.0]  Patient was admitted on 5/2/20 with c/o chills, weakness, and urinary frequency. She was treated for UTI with both aoxicillin and then [facility], however her symptoms were still not improving after 2 weeks. She had tachycardia on admission. She was treated with antibiotics for probable UTI. There was no growth on urine culture. She was seen by OB and was found to have candidal vaginitis and sent home with terazol.     Discharge Exam:    General Appearance: alert and oriented to person, place and time and in no acute distress  Skin: warm and dry  Head: normocephalic and vaginal cream  Commonly known as:  [address] [place] [address] vaginally nightly. CHANGE how you take these medications    LORazepam 0.5 MG tablet  Commonly known as:  ATIVAN  Take 1 tablet by mouth nightly as needed for Anxiety for up to 30 days. What changed:  how much to take        CONTINUE taking these medications    Advil -25 MG Caps  Generic drug:  Ibuprofen-diphenhydrAMINE HCl     aspirin 81 MG tablet     Claritin 10 MG tablet  Generic drug:  loratadine     hydrocortisone 2.5 % rectal cream  Commonly known as: Anusol-HC  Place rectally 2 times daily.      NexIUM 20 MG delayed release capsule  Generic drug:  esomeprazole     spironolactone 25 MG tablet  Commonly known as:  ALDACTONE     Ultimate Probiotic Formula Caps     WOMENS MULTIVITAMIN PO        STOP taking these medications    cefdinir 300 MG capsule  Commonly known as:  OMNICEF           Where to Get Your Medications      These medications were sent to [address] [place] [address]/ [place] [address], [address] [place] [address]    Phone:  [phone]   · miconazole 2 % vaginal cream           Note that more than 30 minutes was spent in preparing discharge papers, discussing discharge with patient, medication review, etc.    Signed:  Electronically signed by Sanjana Chavez MD on 5/4/2020 at 6:20 PM

## 2020-05-04 NOTE — PROGRESS NOTES
cyanosis, no clubbing and no edema  Neurologic: no cranial nerve deficit and speech normal        Recent Labs     05/02/20  0853 05/03/20  0350 05/04/20  0446    144 140   K 4.4 4.4 4.1   CL 99 109* 106   CO2 21* 26 23   BUN 16 12 11   CREATININE 0.7 0.7 0.6   GLUCOSE 95 96 90   CALCIUM 9.7 9.3 9.1       Recent Labs     05/02/20  0853 05/03/20  0350 05/04/20  0446   WBC 6.3 6.3 6.2   RBC 4.94 4.62 4.49   HGB 14.8 14.0 13.6   HCT 43.9 42.1 41.0   MCV 88.9 91.1 91.3   MCH 30.0 30.3 30.3   MCHC 33.7 33.3 33.2   RDW 12.7 13.0 12.9    211 207   MPV 9.5 9.6 9.6       Radiology:   Order Date: 5/2/2020 8:45 AM   Exam: XR CHEST PORTABLE   Number of Images: 1 view   Indication:   SIRS    SIRS    Comparison: Prior study from 04/30/2017 is available       Findings:       The lungs are clear.  There is no evidence of pulmonary infiltrate or   pleural effusion.  The pulmonary vascularity is unremarkable.  The   cardiac, hilar and mediastinal silhouettes are satisfactory.  The bony   thorax demonstrates no gross abnormality.       The study is unchanged from prior study               Impression       NO ACUTE CARDIOPULMONARY PROCESS         Order Date:  4/27/2020 12:30 PM   EXAM: CT ABDOMEN PELVIS W IV CONTRAST   NUMBER OF IMAGES \ views:  024   INDICATION:  LLQ tenderness. Superpubic tenderness. LLQ tenderness. Superpubic tenderness. COMPARISON: 9/30/2019       Technique: Low-dose CT  acquisition technique included one of   following options; 1 . Automated exposure control, 2. Adjustment of MA   and or KV according to patient's size or 3. Use of iterative   reconstruction.       Multiple computerized tomography sections of the abdomen with sagittal   and coronal MPR reconstructions were obtained from the top of the   diaphragm to the pelvis.  The visualized portions of the abdomen   reveal:       The lung bases are unremarkable . Cecy Pascual    The liver is abnormal. There is evidence for diffuse fatty   infiltration  . There is likely focal fatty infiltration at the level   of the falciform ligament in the left lobe.  The gallbladder appears   to be absent.     The spleen is unremarkable. The kidneys are unremarkable   The adrenals is  unremarkable. The pancreas is unremarkable.    The appendix is not seen   The bowel is  abnormal. There are multiple diverticula seen   The bladder is unremarkable.               Impression   There are multiple diverticula seen    Findings compatible with fatty infiltration of the liver             Assessment:    Active Problems:    UTI (urinary tract infection)    History of gastroesophageal reflux (GERD)    Extrinsic asthma without complication    Diverticulosis  Resolved Problems:    Metabolic acidosis, normal anion gap (NAG)    Lactic acidosis      Plan:  1.  UTI              Does not meet septic criteria              UA showing mod LE only              Follow cx, CBC              Pt on IV Rocephin daily; continue for now and follow cx              ID consult requested              Sed rate 9, CRP <0.1   No leukocytosis     2.  metabolic acidosis w borderline gap due to lactic acidosis, now resolved              Lactic 3.0 in ED, CO2 21, gap 14              IVF - got 1 L NS bolus x2 in ED              Follow - normal on 5/4    DVT prophylaxis-lovenox    Electronically signed by Oscar Castro MD on 5/4/2020 at 10:05 AM

## 2020-05-04 NOTE — CONSULTS
Department of Gynecology  Attending Consult Note      Reason for Consult:  LLQ pain  Requesting Physician:  Blake Moore    CHIEF COMPLAINT:   Burning/pain    History obtained from patient    HISTORY OF PRESENT ILLNESS:                   The patient is a 75 y.o. female with significant past medical history of recurrent UTI's who presents with llq pain and burning. Upon this admission patient was also complaining of chills. Patient states the whole area down there burns not just when she urinates. She is also having this left lower quadrant discomfort which she believes is due to her diverticular disease. Patient has been in and out of the hospital and been treated for multiple urinary tract infections in the last 6 months. Patient has been on a lot of antibiotics but has not been on anything to treat yeast she says. Patient states she does have occasional discharge but nothing currently. Patient states she does feel better since admission. Past Medical History:        Diagnosis Date    Acid reflux     Allergic rhinitis     Asthma     ALLERY INDUCED    Diverticulosis     Irritable bowel     Pinched nerve in neck     resolved    Staph aureus infection     UTI (urinary tract infection) 06/20/2015    resolved     Past Surgical History:        Procedure Laterality Date    BREAST SURGERY      cyst    CHOLECYSTECTOMY  07/14/2015    laparoscopic    CYST REMOVAL      behind right ear    DENTAL SURGERY      HERNIA REPAIR      MANDIBLE SURGERY      NASAL FRACTURE SURGERY  1975    NOSE SURGERY  1976    TONSILLECTOMY      TUBAL LIGATION         Past Gynecological History:    1. Last menstrual period:  menopausal  2. Menses: none  3. Contraception: Post menopausal status  4. Sexually transmitted disease history: none           OB History   No obstetric history on file.        Medications Prior to Admission:   Medications Prior to Admission: cefdinir (OMNICEF) 300 MG capsule, Take 1 capsule by mouth 2 times

## 2020-05-04 NOTE — PROGRESS NOTES
[address] [place] [address] [facility]  Progress Note    SUBJECTIVE:  Chief Complaint   Patient presents with    Urinary Tract Infection     Dx with strep in urine taking Omnicef    Extremity Weakness     The patient is feeling better. The left lower quadrant and suprapubic discomfort is improving. No fevers. Tolerating antibiotics. She was seen by gynecology. Review of systems:  As stated above in the chief complaint, otherwise negative. Medications:  Scheduled Meds:   miconazole  1 applicator Vaginal BID    cefTRIAXone (ROCEPHIN) IV  1 g Intravenous Q24H    sodium chloride flush  10 mL Intravenous 2 times per day    enoxaparin  40 mg Subcutaneous Daily    spironolactone  25 mg Oral Daily    pantoprazole  40 mg Oral QAM AC    cetirizine  5 mg Oral Daily    lactobacillus  1 capsule Oral Daily    hydrocortisone   Topical BID    aspirin  81 mg Oral Every Other Day    therapeutic multivitamin-minerals  1 tablet Oral Daily     Continuous Infusions:   sodium chloride 75 mL/hr at 20 0623     PRN Meds:sodium chloride flush, acetaminophen **OR** acetaminophen, polyethylene glycol, promethazine **OR** ondansetron, LORazepam    OBJECTIVE:  /70   Pulse 61   Temp 98 °F (36.7 °C) (Oral)   Resp 16   Ht 5' 6\" (1.676 m)   Wt 145 lb 9.6 oz (66 kg)   SpO2 97%   BMI 23.50 kg/m²   Temp  Av.3 °F (36.8 °C)  Min: 98 °F (36.7 °C)  Max: 98.6 °F (37 °C)  Constitutional: The patient is awake, alert, and oriented. Ambulating. No distress. Skin: Warm and dry. No rashes were noted. HEENT: Round and reactive pupils. Moist mucous membranes. No ulcerations or thrush. Neck: Supple to movements. Chest: Good breath sounds. No crackles. Cardiovascular: Heart sounds rhythmic and regular. Abdomen: Positive bowel sounds to auscultation. Benign to palpation. Extremities: No edema.   Lines: peripheral    Laboratory and Tests Review:  Lab Results   Component Value Date    WBC 6.2

## 2020-05-04 NOTE — PLAN OF CARE
Problem: Falls - Risk of:  Goal: Will remain free from falls  Description: Will remain free from falls  Outcome: Met This Shift  Goal: Absence of physical injury  Description: Absence of physical injury  Outcome: Met This Shift     Problem: Pain:  Description: Pain management should include both nonpharmacologic and pharmacologic interventions.   Goal: Pain level will decrease  Description: Pain level will decrease  5/4/2020 1247 by Chriss Shahid RN  Outcome: Met This Shift  5/4/2020 0604 by Cami Berry RN  Outcome: Met This Shift  Goal: Control of acute pain  Description: Control of acute pain  5/4/2020 1247 by Chriss Shahid RN  Outcome: Met This Shift  5/4/2020 0604 by Cami Berry RN  Outcome: Met This Shift  Goal: Control of chronic pain  Description: Control of chronic pain  Outcome: Met This Shift

## 2020-05-05 ENCOUNTER — HOSPITAL ENCOUNTER (EMERGENCY)
Age: 65
Discharge: HOME | End: 2020-05-05
Attending: EMERGENCY MEDICINE
Payer: MEDICARE

## 2020-05-05 ENCOUNTER — CARE COORDINATION (OUTPATIENT)
Dept: CASE MANAGEMENT | Age: 65
End: 2020-05-05

## 2020-05-05 ENCOUNTER — APPOINTMENT (OUTPATIENT)
Dept: CT IMAGING | Age: 65
End: 2020-05-05
Payer: MEDICARE

## 2020-05-05 VITALS
TEMPERATURE: 98.2 F | HEIGHT: 66 IN | DIASTOLIC BLOOD PRESSURE: 72 MMHG | WEIGHT: 145 LBS | OXYGEN SATURATION: 98 % | RESPIRATION RATE: 22 BRPM | SYSTOLIC BLOOD PRESSURE: 124 MMHG | HEART RATE: 80 BPM | BODY MASS INDEX: 23.3 KG/M2

## 2020-05-05 LAB
ALBUMIN SERPL-MCNC: 4.6 G/DL (ref 3.5–5.2)
ALP BLD-CCNC: 91 U/L (ref 35–104)
ALT SERPL-CCNC: 21 U/L (ref 0–32)
ANION GAP SERPL CALCULATED.3IONS-SCNC: 15 MMOL/L (ref 7–16)
AST SERPL-CCNC: 26 U/L (ref 0–31)
BACTERIA: ABNORMAL /HPF
BASOPHILS ABSOLUTE: 0.06 E9/L (ref 0–0.2)
BASOPHILS RELATIVE PERCENT: 0.8 % (ref 0–2)
BILIRUB SERPL-MCNC: 0.2 MG/DL (ref 0–1.2)
BILIRUBIN DIRECT: <0.2 MG/DL (ref 0–0.3)
BILIRUBIN URINE: NEGATIVE
BILIRUBIN, INDIRECT: NORMAL MG/DL (ref 0–1)
BLOOD, URINE: ABNORMAL
BUN BLDV-MCNC: 11 MG/DL (ref 8–23)
CALCIUM SERPL-MCNC: 10.1 MG/DL (ref 8.6–10.2)
CHLORIDE BLD-SCNC: 106 MMOL/L (ref 98–107)
CLARITY: ABNORMAL
CO2: 19 MMOL/L (ref 22–29)
COLOR: YELLOW
CREAT SERPL-MCNC: 0.6 MG/DL (ref 0.5–1)
EOSINOPHILS ABSOLUTE: 0.07 E9/L (ref 0.05–0.5)
EOSINOPHILS RELATIVE PERCENT: 0.9 % (ref 0–6)
EPITHELIAL CELLS, UA: ABNORMAL /HPF
GFR AFRICAN AMERICAN: >60
GFR NON-AFRICAN AMERICAN: >60 ML/MIN/1.73
GLUCOSE BLD-MCNC: 99 MG/DL (ref 74–99)
GLUCOSE URINE: NEGATIVE MG/DL
HCT VFR BLD CALC: 44.2 % (ref 34–48)
HEMOGLOBIN: 15.2 G/DL (ref 11.5–15.5)
IMMATURE GRANULOCYTES #: 0.02 E9/L
IMMATURE GRANULOCYTES %: 0.3 % (ref 0–5)
KETONES, URINE: NEGATIVE MG/DL
LACTIC ACID: 2.2 MMOL/L (ref 0.5–2.2)
LEUKOCYTE ESTERASE, URINE: ABNORMAL
LIPASE: 26 U/L (ref 13–60)
LYMPHOCYTES ABSOLUTE: 1.71 E9/L (ref 1.5–4)
LYMPHOCYTES RELATIVE PERCENT: 23.2 % (ref 20–42)
MCH RBC QN AUTO: 30.2 PG (ref 26–35)
MCHC RBC AUTO-ENTMCNC: 34.4 % (ref 32–34.5)
MCV RBC AUTO: 87.9 FL (ref 80–99.9)
MONOCYTES ABSOLUTE: 0.53 E9/L (ref 0.1–0.95)
MONOCYTES RELATIVE PERCENT: 7.2 % (ref 2–12)
NEUTROPHILS ABSOLUTE: 4.99 E9/L (ref 1.8–7.3)
NEUTROPHILS RELATIVE PERCENT: 67.6 % (ref 43–80)
NITRITE, URINE: NEGATIVE
PDW BLD-RTO: 12.8 FL (ref 11.5–15)
PH UA: 8 (ref 5–9)
PLATELET # BLD: 246 E9/L (ref 130–450)
PMV BLD AUTO: 9.8 FL (ref 7–12)
POTASSIUM REFLEX MAGNESIUM: 4.4 MMOL/L (ref 3.5–5)
PRO-BNP: 173 PG/ML (ref 0–125)
PROTEIN UA: NEGATIVE MG/DL
RBC # BLD: 5.03 E12/L (ref 3.5–5.5)
RBC UA: ABNORMAL /HPF (ref 0–2)
SODIUM BLD-SCNC: 140 MMOL/L (ref 132–146)
SPECIFIC GRAVITY UA: 1.01 (ref 1–1.03)
TOTAL PROTEIN: 7.8 G/DL (ref 6.4–8.3)
TROPONIN: <0.01 NG/ML (ref 0–0.03)
TSH SERPL DL<=0.05 MIU/L-ACNC: 1.79 UIU/ML (ref 0.27–4.2)
URINE CULTURE, ROUTINE: NORMAL
UROBILINOGEN, URINE: 0.2 E.U./DL
WBC # BLD: 7.4 E9/L (ref 4.5–11.5)
WBC UA: ABNORMAL /HPF (ref 0–5)

## 2020-05-05 PROCEDURE — 2580000003 HC RX 258: Performed by: EMERGENCY MEDICINE

## 2020-05-05 PROCEDURE — 83690 ASSAY OF LIPASE: CPT

## 2020-05-05 PROCEDURE — 85025 COMPLETE CBC W/AUTO DIFF WBC: CPT

## 2020-05-05 PROCEDURE — 99284 EMERGENCY DEPT VISIT MOD MDM: CPT

## 2020-05-05 PROCEDURE — 81001 URINALYSIS AUTO W/SCOPE: CPT

## 2020-05-05 PROCEDURE — 80076 HEPATIC FUNCTION PANEL: CPT

## 2020-05-05 PROCEDURE — 84484 ASSAY OF TROPONIN QUANT: CPT

## 2020-05-05 PROCEDURE — 93005 ELECTROCARDIOGRAM TRACING: CPT | Performed by: EMERGENCY MEDICINE

## 2020-05-05 PROCEDURE — 80048 BASIC METABOLIC PNL TOTAL CA: CPT

## 2020-05-05 PROCEDURE — 70450 CT HEAD/BRAIN W/O DYE: CPT

## 2020-05-05 PROCEDURE — 6370000000 HC RX 637 (ALT 250 FOR IP): Performed by: EMERGENCY MEDICINE

## 2020-05-05 PROCEDURE — 83880 ASSAY OF NATRIURETIC PEPTIDE: CPT

## 2020-05-05 PROCEDURE — 83605 ASSAY OF LACTIC ACID: CPT

## 2020-05-05 PROCEDURE — 84443 ASSAY THYROID STIM HORMONE: CPT

## 2020-05-05 RX ORDER — 0.9 % SODIUM CHLORIDE 0.9 %
500 INTRAVENOUS SOLUTION INTRAVENOUS ONCE
Status: COMPLETED | OUTPATIENT
Start: 2020-05-05 | End: 2020-05-05

## 2020-05-05 RX ORDER — LORAZEPAM 0.5 MG/1
0.5 TABLET ORAL ONCE
Status: COMPLETED | OUTPATIENT
Start: 2020-05-05 | End: 2020-05-05

## 2020-05-05 RX ADMIN — SODIUM CHLORIDE 500 ML: 9 INJECTION, SOLUTION INTRAVENOUS at 15:22

## 2020-05-05 RX ADMIN — LORAZEPAM 0.5 MG: 0.5 TABLET ORAL at 15:23

## 2020-05-05 ASSESSMENT — ENCOUNTER SYMPTOMS
COUGH: 0
SINUS PAIN: 0
BACK PAIN: 0
ABDOMINAL PAIN: 0
SORE THROAT: 0
DIARRHEA: 0
CHEST TIGHTNESS: 0
SHORTNESS OF BREATH: 0
NAUSEA: 0
VOMITING: 0

## 2020-05-05 ASSESSMENT — PAIN DESCRIPTION - LOCATION: LOCATION: ABDOMEN

## 2020-05-05 ASSESSMENT — PAIN DESCRIPTION - PAIN TYPE: TYPE: ACUTE PAIN

## 2020-05-05 ASSESSMENT — PAIN SCALES - GENERAL: PAINLEVEL_OUTOF10: 7

## 2020-05-05 NOTE — ED PROVIDER NOTES
infection from blood work. Discussed with patient, she states things like this have been going on for years and no one can figure out what is wrong with her. I offered counseling services as she becomes very upset and cries when I go through her results on every reevaluation. She states that she needs to see a GI doctor. She already sees Dr. Miko Villalobos, she is planning on having a scope done. I discussed I can also refer her to neurology if she is having tremors but she states it isn't a problem with her brain, she has an infection somewhere. Discussed she should continue taking the medicine prescribed by OBGYN on her last visit and return for reevaluation at anytime especially if symptoms worsen. She voices understanding with the plan. Amount and/or Complexity of Data Reviewed  Clinical lab tests: reviewed and ordered  Tests in the radiology section of CPT®: reviewed and ordered         ED Course as of May 05 2211   Tue May 05, 2020   1544 IMPRESSION:     No evidence of acute intracranial hemorrhage or edema. CT Head WO Contrast [CW]   1631 EKG: This EKG is signed and interpreted by me. Rate: 66  Rhythm: Sinus  Interpretation: nsr, no armani  Comparison: unchanged      [CW]   0433 I had a very long discussion with the patient about her normal test results and the need to follow-up with OB/GYN. She states she is following up with GI for a scope because she continues to have burning in her abdomen. She will call Dr. Ayla Peace office in the morning. Asking for nystatin because she states she gets a lot of the burning after taking antibiotics which she got when she was in the hospital.  Will write for this and discharge. She understands return precautions.     [CW]      ED Course User Index  [CW] Andrew Fitzpatrick, DO        --------------------------------------------- PAST HISTORY ---------------------------------------------  Past Medical History:  has a past medical history of Acid reflux, Allergic rhinitis, Asthma, Diverticulosis, Irritable bowel, Pinched nerve in neck, Staph aureus infection, and UTI (urinary tract infection). Past Surgical History:  has a past surgical history that includes hernia repair; Dental surgery; Tubal ligation; cyst removal; Tonsillectomy; Breast surgery; Nasal fracture surgery (1975); Nose surgery (1976); Cholecystectomy (07/14/2015); and Mandible surgery. Social History:  reports that she has quit smoking. She has a 10.00 pack-year smoking history. She has never used smokeless tobacco. She reports current alcohol use. She reports that she does not use drugs. Family History: family history includes Alzheimer's Disease in her father; Cirrhosis in her mother. The patients home medications have been reviewed. Allergies: Lipitor [atorvastatin]; Bentyl [dicyclomine hcl]; Chlorhexidine gluconate; Ciprofloxacin; Codeine; Crestor [rosuvastatin]; Entex [ami-eladio]; Florastor [yeast]; Levaquin [levofloxacin in d5w]; Levsin [hyoscyamine sulfate]; Librax [chlordiazepoxide-clidinium]; Morphine; Reglan [metoclopramide]; Septra [bactrim]; Singulair [montelukast sodium]; Xyzal [levocetirizine]; Zetia [ezetimibe]; Adhesive tape; Doxycycline calcium;  Oxytetracycline; Sumycin [tetracycline hcl]; and Tetracyclines & related    -------------------------------------------------- RESULTS -------------------------------------------------  Labs:  Results for orders placed or performed during the hospital encounter of 05/05/20   Urinalysis, reflex to microscopic   Result Value Ref Range    Color, UA Yellow Straw/Yellow    Clarity, UA CLOUDY (A) Clear    Glucose, Ur Negative Negative mg/dL    Bilirubin Urine Negative Negative    Ketones, Urine Negative Negative mg/dL    Specific Gravity, UA 1.015 1.005 - 1.030    Blood, Urine TRACE-LYSED Negative    pH, UA 8.0 5.0 - 9.0    Protein, UA Negative Negative mg/dL    Urobilinogen, Urine 0.2 <2.0 E.U./dL    Nitrite, Urine Negative Negative    Leukocyte Esterase, Urine LARGE (A) Negative   CBC Auto Differential   Result Value Ref Range    WBC 7.4 4.5 - 11.5 E9/L    RBC 5.03 3.50 - 5.50 E12/L    Hemoglobin 15.2 11.5 - 15.5 g/dL    Hematocrit 44.2 34.0 - 48.0 %    MCV 87.9 80.0 - 99.9 fL    MCH 30.2 26.0 - 35.0 pg    MCHC 34.4 32.0 - 34.5 %    RDW 12.8 11.5 - 15.0 fL    Platelets 326 382 - 381 E9/L    MPV 9.8 7.0 - 12.0 fL    Neutrophils % 67.6 43.0 - 80.0 %    Immature Granulocytes % 0.3 0.0 - 5.0 %    Lymphocytes % 23.2 20.0 - 42.0 %    Monocytes % 7.2 2.0 - 12.0 %    Eosinophils % 0.9 0.0 - 6.0 %    Basophils % 0.8 0.0 - 2.0 %    Neutrophils Absolute 4.99 1.80 - 7.30 E9/L    Immature Granulocytes # 0.02 E9/L    Lymphocytes Absolute 1.71 1.50 - 4.00 E9/L    Monocytes Absolute 0.53 0.10 - 0.95 E9/L    Eosinophils Absolute 0.07 0.05 - 0.50 E9/L    Basophils Absolute 0.06 0.00 - 0.20 L4/B   Basic Metabolic Panel w/ Reflex to MG   Result Value Ref Range    Sodium 140 132 - 146 mmol/L    Potassium reflex Magnesium 4.4 3.5 - 5.0 mmol/L    Chloride 106 98 - 107 mmol/L    CO2 19 (L) 22 - 29 mmol/L    Anion Gap 15 7 - 16 mmol/L    Glucose 99 74 - 99 mg/dL    BUN 11 8 - 23 mg/dL    CREATININE 0.6 0.5 - 1.0 mg/dL    GFR Non-African American >60 >=60 mL/min/1.73    GFR African American >60     Calcium 10.1 8.6 - 10.2 mg/dL   Hepatic Function Panel   Result Value Ref Range    Total Protein 7.8 6.4 - 8.3 g/dL    Alb 4.6 3.5 - 5.2 g/dL    Alkaline Phosphatase 91 35 - 104 U/L    ALT 21 0 - 32 U/L    AST 26 0 - 31 U/L    Total Bilirubin 0.2 0.0 - 1.2 mg/dL    Bilirubin, Direct <0.2 0.0 - 0.3 mg/dL    Bilirubin, Indirect see below 0.0 - 1.0 mg/dL   Lipase   Result Value Ref Range    Lipase 26 13 - 60 U/L   Lactic Acid, Plasma   Result Value Ref Range    Lactic Acid 2.2 0.5 - 2.2 mmol/L   Troponin   Result Value Ref Range    Troponin <0.01 0.00 - 0.03 ng/mL   Brain Natriuretic Peptide   Result Value Ref Range    Pro- (H) 0 - 125 pg/mL   TSH without Reflex   Result Value Ref

## 2020-05-06 ENCOUNTER — CARE COORDINATION (OUTPATIENT)
Dept: CARE COORDINATION | Age: 65
End: 2020-05-06

## 2020-05-06 LAB
EKG ATRIAL RATE: 66 BPM
EKG P AXIS: 44 DEGREES
EKG P-R INTERVAL: 182 MS
EKG Q-T INTERVAL: 404 MS
EKG QRS DURATION: 84 MS
EKG QTC CALCULATION (BAZETT): 423 MS
EKG R AXIS: 40 DEGREES
EKG T AXIS: 27 DEGREES
EKG VENTRICULAR RATE: 66 BPM

## 2020-05-06 PROCEDURE — 93010 ELECTROCARDIOGRAM REPORT: CPT | Performed by: INTERNAL MEDICINE

## 2020-05-06 NOTE — CARE COORDINATION
COVID-19 ED/Flu Clinic Initial Outreach Note    Patient contacted regarding recent visit for viral symptoms. This Bob Goltz contacted the patient by telephone to perform post discharge call. Verified name and  with patient as identifiers. Provided introduction to self, and reason for call due to viral symptoms of infection and/or exposure to COVID-19. Patient presented to emergency department/flu clinic with complaints of viral symptoms/exposure to COVID. Patient reports symptoms are improving. Due to no new or worsening symptoms the RN CTN/TESSIE was not notified for escalation. Discussed exposure protocols and quarantine with CDC Guidelines What To Do If You Are Sick    Patient was given an opportunity for questions and concerns. Stay home except to get medical care  People who are mildly ill with COVID-19 are able to isolate at home during their illness. You should restrict activities outside your home, except for getting medical care. Do not go to work, school, or public areas. Avoid using public transportation, ride-sharing, or taxis. Separate yourself from other people and animals in your home  People: As much as possible, you should stay in a specific room and away from other people in your home. Also, you should use a separate bathroom, if available. Animals: You should restrict contact with pets and other animals while you are sick with COVID-19, just like you would around other people. Although there have not been reports of pets or other animals becoming sick with COVID-19, it is still recommended that people sick with COVID-19 limit contact with animals until more information is known about the virus. When possible, have another member of your household care for your animals while you are sick. If you are sick with COVID-19, avoid contact with your pet, including petting, snuggling, being kissed or licked, and sharing food.  If you must care for your pet or be around animals while you are tabletops, doorknobs, bathroom fixtures, toilets, phones, keyboards, tablets, and bedside tables. Also, clean any surfaces that may have blood, stool, or body fluids on them. Use a household cleaning spray or wipe, according to the label instructions. Labels contain instructions for safe and effective use of the cleaning product including precautions you should take when applying the product, such as wearing gloves and making sure you have good ventilation during use of the product. Monitor your symptoms  Seek prompt medical attention if your illness is worsening (e.g., difficulty breathing). Before seeking care, call your healthcare provider and tell them that you have, or are being evaluated for, COVID-19. Put on a facemask before you enter the facility. These steps will help the healthcare provider's office to keep other people in the office or waiting room from getting infected or exposed. Ask your healthcare provider to call the local or [facility] health department. Persons who are placed under If you have a medical emergency and need to call 911, notify the dispatch personnel that you have, or are being evaluated for COVID-19. If possible, put on a facemask before emergency medical services arrive. The patient agrees to contact the [employer] exposure line [phone], local health department [place] [facility]: ([phone]) and PCP office for questions related to their healthcare. Author provided contact information for future reference. Patient/family/caregiver given information for [employer] and agrees to enroll no    Based on Loop alert triggers, patient will be contacted by nurse care manager for worsening symptoms.

## 2020-05-07 LAB
BLOOD CULTURE, ROUTINE: NORMAL
CULTURE, BLOOD 2: NORMAL

## 2020-05-12 ENCOUNTER — HOSPITAL ENCOUNTER (OUTPATIENT)
Age: 65
Discharge: HOME | End: 2020-05-14
Payer: MEDICARE

## 2020-05-12 PROCEDURE — 87077 CULTURE AEROBIC IDENTIFY: CPT

## 2020-05-12 PROCEDURE — 87186 SC STD MICRODIL/AGAR DIL: CPT

## 2020-05-12 PROCEDURE — 87088 URINE BACTERIA CULTURE: CPT

## 2020-05-19 PROBLEM — N30.90 CYSTITIS: Status: ACTIVE | Noted: 2020-05-19

## 2020-05-20 ENCOUNTER — CARE COORDINATION (OUTPATIENT)
Dept: CARE COORDINATION | Age: 65
End: 2020-05-20

## 2020-06-01 PROBLEM — N39.0 UTI (URINARY TRACT INFECTION): Status: RESOLVED | Noted: 2020-05-02 | Resolved: 2020-06-01

## 2020-06-28 ENCOUNTER — HOSPITAL ENCOUNTER (EMERGENCY)
Age: 65
Discharge: HOME | End: 2020-06-28
Attending: EMERGENCY MEDICINE
Payer: MEDICARE

## 2020-06-28 VITALS
HEART RATE: 66 BPM | BODY MASS INDEX: 21.97 KG/M2 | SYSTOLIC BLOOD PRESSURE: 135 MMHG | OXYGEN SATURATION: 97 % | DIASTOLIC BLOOD PRESSURE: 76 MMHG | WEIGHT: 140 LBS | TEMPERATURE: 98.1 F | HEIGHT: 67 IN | RESPIRATION RATE: 14 BRPM

## 2020-06-28 LAB
B.E.: -1.2 MMOL/L (ref -3–3)
B.E.: 0.9 MMOL/L (ref -3–0)
BACTERIA: ABNORMAL /HPF
BILIRUBIN URINE: NEGATIVE
BLOOD, URINE: ABNORMAL
CLARITY: CLEAR
COHB: 0.6 % (ref 0–1.5)
COLOR: YELLOW
CRITICAL NOTIFICATION: YES
CRITICAL: ABNORMAL
DATE ANALYZED: ABNORMAL
DATE OF COLLECTION: ABNORMAL
DEVICE: ABNORMAL
EKG ATRIAL RATE: 70 BPM
EKG P-R INTERVAL: 150 MS
EKG Q-T INTERVAL: 380 MS
EKG QRS DURATION: 78 MS
EKG QTC CALCULATION (BAZETT): 410 MS
EKG R AXIS: 41 DEGREES
EKG T AXIS: 31 DEGREES
EKG VENTRICULAR RATE: 70 BPM
EPITHELIAL CELLS, UA: ABNORMAL /HPF
GLUCOSE URINE: NEGATIVE MG/DL
HCO3 ARTERIAL: 19.6 MMOL/L (ref 22–26)
HCO3: 18.8 MMOL/L (ref 22–26)
HHB: 5.2 % (ref 0–5)
KETONES, URINE: 15 MG/DL
LAB: ABNORMAL
LEUKOCYTE ESTERASE, URINE: ABNORMAL
Lab: ABNORMAL
METHB: 0.3 % (ref 0–1.5)
MODE: ABNORMAL
NITRITE, URINE: NEGATIVE
O2 CONTENT: 20 ML/DL
O2 SATURATION: 94.8 % (ref 92–98.5)
O2 SATURATION: 99.2 % (ref 92–98.5)
O2HB: 93.9 % (ref 94–97)
OPERATOR ID: 3342
OPERATOR ID: 366
PATIENT TEMP: 37
PATIENT TEMP: 37 C
PCO2 (TEMP CORRECTED): 18.9 MMHG (ref 35–45)
PCO2: 21.9 MMHG (ref 35–45)
PH (TEMPERATURE CORRECTED): 7.62 (ref 7.35–7.45)
PH BLOOD GAS: 7.55 (ref 7.35–7.45)
PH UA: 6.5 (ref 5–9)
PO2 (TEMP CORRECTED): 107.3 MMHG (ref 60–80)
PO2: 61.5 MMHG (ref 75–100)
PROTEIN UA: NEGATIVE MG/DL
RBC UA: ABNORMAL /HPF (ref 0–2)
SOURCE, BLOOD GAS: ABNORMAL
SOURCE, BLOOD GAS: ABNORMAL
SPECIFIC GRAVITY UA: <=1.005 (ref 1–1.03)
THB: 15.2 G/DL (ref 11.5–16.5)
TIME ANALYZED: 1654
UROBILINOGEN, URINE: 0.2 E.U./DL
WBC UA: ABNORMAL /HPF (ref 0–5)

## 2020-06-28 PROCEDURE — 82805 BLOOD GASES W/O2 SATURATION: CPT

## 2020-06-28 PROCEDURE — 93010 ELECTROCARDIOGRAM REPORT: CPT | Performed by: INTERNAL MEDICINE

## 2020-06-28 PROCEDURE — 82803 BLOOD GASES ANY COMBINATION: CPT

## 2020-06-28 PROCEDURE — 81001 URINALYSIS AUTO W/SCOPE: CPT

## 2020-06-28 PROCEDURE — 93005 ELECTROCARDIOGRAM TRACING: CPT | Performed by: STUDENT IN AN ORGANIZED HEALTH CARE EDUCATION/TRAINING PROGRAM

## 2020-06-28 PROCEDURE — 99284 EMERGENCY DEPT VISIT MOD MDM: CPT

## 2020-06-28 ASSESSMENT — ENCOUNTER SYMPTOMS
CHEST TIGHTNESS: 0
EYE PAIN: 0
EYE DISCHARGE: 0
SHORTNESS OF BREATH: 0
WHEEZING: 0
EYE REDNESS: 0
ABDOMINAL DISTENTION: 0
VOMITING: 0
STRIDOR: 0
SINUS PRESSURE: 0
VOICE CHANGE: 0
CHOKING: 0
NAUSEA: 0
TROUBLE SWALLOWING: 0
APNEA: 0
COLOR CHANGE: 0
COUGH: 0
DIARRHEA: 0
ABDOMINAL PAIN: 0
BACK PAIN: 0
SORE THROAT: 0

## 2020-06-28 ASSESSMENT — PAIN DESCRIPTION - ORIENTATION: ORIENTATION: RIGHT;LEFT;LOWER

## 2020-06-28 ASSESSMENT — PAIN DESCRIPTION - LOCATION: LOCATION: ABDOMEN

## 2020-06-28 ASSESSMENT — PAIN SCALES - GENERAL: PAINLEVEL_OUTOF10: 6

## 2020-06-28 ASSESSMENT — PAIN DESCRIPTION - DESCRIPTORS: DESCRIPTORS: BURNING

## 2020-06-28 ASSESSMENT — PAIN DESCRIPTION - PAIN TYPE: TYPE: ACUTE PAIN

## 2020-06-28 ASSESSMENT — PAIN - FUNCTIONAL ASSESSMENT: PAIN_FUNCTIONAL_ASSESSMENT: ACTIVITIES ARE NOT PREVENTED

## 2020-06-28 ASSESSMENT — PAIN DESCRIPTION - FREQUENCY: FREQUENCY: CONTINUOUS

## 2020-06-28 NOTE — ED PROVIDER NOTES
76-year-old female with past medical history of panic attacks, irritable bowel, and urinary tract infection presents with complaints of allergic reaction to Tylenol and Carafate. Patient states that when she takes Tylenol she starts getting shaky. She states it feels like a \"panic attack. \"  She thinks Ativan 0.5 mg for her panic attacks. She is taking this multiple times yesterday with no relief of her symptoms. Patient states that she is still taking Tylenol even though these symptoms are reoccurring. She has no other associated symptoms. Denies any fevers, chills, nausea, vomiting, chest pain, shortness breath, abdominal pain, flank pain, diarrhea, constipation, new Rashes or sores, tongue swelling, difficulty swallowing, or voice changes. Review of Systems   Constitutional: Negative for activity change, appetite change, chills, diaphoresis, fatigue and fever. HENT: Negative for ear pain, sinus pressure, sore throat, trouble swallowing and voice change. Eyes: Negative for pain, discharge and redness. Respiratory: Negative for apnea, cough, choking, chest tightness, shortness of breath, wheezing and stridor. Cardiovascular: Negative for chest pain and leg swelling. Gastrointestinal: Negative for abdominal distention, abdominal pain, diarrhea, nausea and vomiting. Endocrine: Negative for polyphagia and polyuria. Genitourinary: Positive for dysuria. Negative for difficulty urinating, dyspareunia and frequency. Musculoskeletal: Negative for arthralgias, back pain, gait problem, joint swelling, myalgias and neck pain. Skin: Negative for color change, pallor, rash and wound. Neurological: Negative for dizziness, tremors, seizures, syncope, facial asymmetry, speech difficulty, weakness, light-headedness, numbness and headaches. Hematological: Negative for adenopathy. Does not bruise/bleed easily. Psychiatric/Behavioral: Negative for agitation. The patient is nervous/anxious. All other systems reviewed and are negative. Physical Exam  Vitals signs and nursing note reviewed. Constitutional:       General: She is not in acute distress. Appearance: Normal appearance. She is well-developed. She is not ill-appearing, toxic-appearing or diaphoretic. Comments: Anxious, pressured speech, she is shaking her hands in front of her face in room. HENT:      Head: Normocephalic and atraumatic. Nose: Nose normal. No congestion or rhinorrhea. Mouth/Throat:      Mouth: Mucous membranes are moist.      Pharynx: Oropharynx is clear. No oropharyngeal exudate or posterior oropharyngeal erythema. Comments: Tongue not swollen, no erythema, no wounds. Eyes:      General: No scleral icterus. Right eye: No discharge. Left eye: No discharge. Extraocular Movements: Extraocular movements intact. Conjunctiva/sclera: Conjunctivae normal.      Pupils: Pupils are equal, round, and reactive to light. Neck:      Musculoskeletal: Normal range of motion and neck supple. No neck rigidity or muscular tenderness. Vascular: No carotid bruit. Cardiovascular:      Rate and Rhythm: Normal rate and regular rhythm. Pulses: Normal pulses. Heart sounds: Normal heart sounds. No murmur. Comments: Radial pulses 2+ equal bilateral.  Tortoise pedal pulse 2+ equal bilateral.  Pulmonary:      Effort: Pulmonary effort is normal. No respiratory distress. Breath sounds: Normal breath sounds. No stridor. No wheezing, rhonchi or rales. Comments: CTA B. No wheezes rales or rhonchi. Tachypneic. With short shallow breathing. Chest:      Chest wall: No tenderness. Abdominal:      General: Abdomen is flat. Bowel sounds are normal. There is no distension. Palpations: Abdomen is soft. There is no mass. Tenderness: There is abdominal tenderness. There is no right CVA tenderness, left CVA tenderness, guarding or rebound.       Hernia: No hernia that she will follow-up with her primary care physician as soon as she is able. Patient is been no acute distress, and hemodynamically stable throughout her entire ER stay. ED Course as of Jun 28 2208   Sun Jun 28, 2020   1616 ATTENDING PROVIDER ATTESTATION:     I have personally performed and/or participated in the history, exam, medical decision making, and procedures and agree with all pertinent clinical information unless otherwise noted. I have also reviewed and agree with the past medical, family and social history unless otherwise noted. I have discussed this patient in detail with the resident and provided the instruction and education regarding the evidence-based evaluation and treatment of anxiety  History: Patient states she took a dose of Tylenol today and then became anxious and had tremors. She states that she is allergic to Tylenol and that it causes anxiety and tremors. However, she still continues to take Tylenol. She states that she can take Percocet and Norco but, is also allergic to them as they have \"absolutely no effect on me. \"  She states only morphine, Demerol and Dilaudid help for her pain. I explained that I was referring to the Tylenol component. She is able to take Norco without any tremors I doubt this is truly a Tylenol allergy. She states that she is prescribed Ativan for extreme anxiety disorder. And she did try some of that with no help. My findings: Randa Rousseau is a 75 y.o. female whom is in mild distress. Physical exam reveals she is fidgeting about the bed flailing her arms back and forth as well as noted tremors when she is calm down. She is speaking very rapidly with pressured speech. Her heart is regular her lungs are clear. Her abdomen is soft nontender. Distal perfusion is good. My plan: Symptomatic and supportive care. Electronically signed by Rodolfo Hilliard DO on 6/28/20 at 4:16 PM EDT          [TG]   0204 EKG read by me. Heart rate 70. care.    Electronically signed by Zen Mcfadden DO on 6/28/20 at 4:16 PM EDT          [TG]   1630 EKG read by me. Heart rate 70. Normal sinus rhythm. No QT C prolongation. No T wave inversions or flattening. No ST depressions or elevations. [JV]   7448 A call was placed to lab about urinalysis. They state that it will be run soon. [JV]      ED Course User Index  [JV] Suellen Lanes, MD  [TG] Zen Mcfadden DO       --------------------------------------------- PAST HISTORY ---------------------------------------------  Past Medical History:  has a past medical history of Acid reflux, Allergic rhinitis, Asthma, Diverticulosis, Irritable bowel, Pinched nerve in neck, Staph aureus infection, and UTI (urinary tract infection). Past Surgical History:  has a past surgical history that includes hernia repair; Dental surgery; Tubal ligation; cyst removal; Tonsillectomy; Breast surgery; Nasal fracture surgery (1975); Nose surgery (1976); Cholecystectomy (07/14/2015); and Mandible surgery. Social History:  reports that she has quit smoking. She has a 10.00 pack-year smoking history. She has never used smokeless tobacco. She reports current alcohol use. She reports that she does not use drugs. Family History: family history includes Alzheimer's Disease in her father; Cirrhosis in her mother. The patients home medications have been reviewed. Allergies: Lipitor [atorvastatin]; Bentyl [dicyclomine hcl]; Chlorhexidine gluconate; Ciprofloxacin; Codeine; Crestor [rosuvastatin]; Entex [ami-eladio]; Florastor [yeast]; Levaquin [levofloxacin in d5w]; Levsin [hyoscyamine sulfate]; Librax [chlordiazepoxide-clidinium]; Morphine; Reglan [metoclopramide]; Septra [bactrim]; Singulair [montelukast sodium]; Xyzal [levocetirizine]; Zetia [ezetimibe]; Adhesive tape; Doxycycline calcium;  Oxytetracycline; Sumycin [tetracycline hcl]; and Tetracyclines & related    -------------------------------------------------- RESULTS -------------------------------------------------  Labs:  Results for orders placed or performed during the hospital encounter of 06/28/20   Urinalysis, reflex to microscopic   Result Value Ref Range    Color, UA Yellow Straw/Yellow    Clarity, UA Clear Clear    Glucose, Ur Negative Negative mg/dL    Bilirubin Urine Negative Negative    Ketones, Urine 15 (A) Negative mg/dL    Specific Gravity, UA <=1.005 1.005 - 1.030    Blood, Urine TRACE-LYSED Negative    pH, UA 6.5 5.0 - 9.0    Protein, UA Negative Negative mg/dL    Urobilinogen, Urine 0.2 <2.0 E.U./dL    Nitrite, Urine Negative Negative    Leukocyte Esterase, Urine LARGE (A) Negative   Blood Gas, Arterial   Result Value Ref Range    Date Analyzed [id number]     Time Analyzed 1654     Source: Blood Arterial     pH, Blood Gas 7.552 (H) 7.350 - 7.450    PCO2 21.9 (L) 35.0 - 45.0 mmHg    PO2 61.5 (L) 75.0 - 100.0 mmHg    HCO3 18.8 (L) 22.0 - 26.0 mmol/L    B.E. -1.2 -3.0 - 3.0 mmol/L    O2 Sat 94.8 92.0 - 98.5 %    O2Hb 93.9 (L) 94.0 - 97.0 %    COHb 0.6 0.0 - 1.5 %    MetHb 0.3 0.0 - 1.5 %    O2 Content 20.0 mL/dL    HHb 5.2 (H) 0.0 - 5.0 %    tHb (est) 15.2 11.5 - 16.5 g/dL    Mode RA     Date Of Collection      Time Collected      Pt Temp 37.0 C     ID [id number]     Lab [id number]     Critical(s) Notified .  No Critical Values    Arterial Blood Gas, Respiratory Only   Result Value Ref Range    Source: Arterial     Pt Temp 37.0     PH (TEMPERATURE CORRECTED) 7.624 (HH) 7.350 - 7.450    PCO2 (TEMP CORRECTED) 18.9 (LL) 35.0 - 45.0 mmHg    PO2 (TEMP CORRECTED) 107.3 (H) 60.0 - 80.0 mmHg    HCO3, Arterial 19.6 (L) 22.0 - 26.0 mmol/L    B.E. 0.9 (H) -3.0 - 0.0 mmol/L    O2 Sat 99.2 (H) 92.0 - 98.5 %     ID 3,342     DEVICE [id number]     Critical Notification Yes    Microscopic Urinalysis   Result Value Ref Range    WBC, UA 10-20 (A) 0 - 5 /HPF    RBC, UA NONE 0 - 2 /HPF    Epithelial Cells, UA FEW /HPF    Bacteria, UA NONE SEEN None Seen /HPF   EKG home  Patient's condition is stable.        Harshal Nix MD  Resident  06/28/20 3133

## 2020-06-29 ENCOUNTER — CARE COORDINATION (OUTPATIENT)
Dept: CARE COORDINATION | Age: 65
End: 2020-06-29

## 2020-06-29 NOTE — CARE COORDINATION
Patient contacted regarding recent discharge and COVID-19 risk. Discussed COVID-19 related testing which was not done at this time. Test results were not done. Patient informed of results, if available? N//A    Ambulatory Care Manager contacted the patient by telephone to perform post discharge assessment. Verified name and  with patient as identifiers. Patient has following risk factors of: no known risk factors. ACM reviewed discharge instructions, medical action plan and red flags related to discharge diagnosis. Reviewed and educated them on any new and changed medications related to discharge diagnosis. Advised obtaining a 90-day supply of all daily and as-needed medications. Spoke with [email or web address], stated she is \"much, much, better. \" Lior Sport said she was having a panic attack. She has plans to schedule a f/u with Dr. Katerine Croft, just has not done so. No other concerns at this time. MyChart: active. Education provided regarding infection prevention, and signs and symptoms of COVID-19 and when to seek medical attention with patient who verbalized understanding. Discussed exposure protocols and quarantine from [address] you at higher risk for severe illness  and given an opportunity for questions and concerns. The patient agrees to contact the COVID-19 hotline [phone] or PCP office for questions related to their healthcare. AC provided contact information for future reference. From CDC: Are you at higher risk for severe illness?  Wash your hands often.  Avoid close contact (6 feet, which is about two arm lengths) with people who are sick.  Put distance between yourself and other people if COVID-19 is spreading in your community.  Clean and disinfect frequently touched surfaces.  Avoid all cruise travel and non-essential air travel.  Call your healthcare professional if you have concerns about COVID-19 and your underlying condition or if you are sick.     For more information on steps you can take to protect yourself, see CDC's How to [address] for follow-up call in 7-14 days based on severity of symptoms and risk factors.     PLAN    14 day ED f/u call

## 2020-07-13 ENCOUNTER — CARE COORDINATION (OUTPATIENT)
Dept: CARE COORDINATION | Age: 65
End: 2020-07-13

## 2020-11-08 ENCOUNTER — APPOINTMENT (OUTPATIENT)
Dept: GENERAL RADIOLOGY | Age: 65
End: 2020-11-08
Payer: MEDICARE

## 2020-11-08 ENCOUNTER — HOSPITAL ENCOUNTER (EMERGENCY)
Age: 65
Discharge: HOME | End: 2020-11-08
Attending: EMERGENCY MEDICINE
Payer: MEDICARE

## 2020-11-08 VITALS
WEIGHT: 145 LBS | HEIGHT: 66 IN | SYSTOLIC BLOOD PRESSURE: 136 MMHG | BODY MASS INDEX: 23.3 KG/M2 | OXYGEN SATURATION: 98 % | RESPIRATION RATE: 18 BRPM | HEART RATE: 70 BPM | TEMPERATURE: 98 F | DIASTOLIC BLOOD PRESSURE: 69 MMHG

## 2020-11-08 LAB
EKG ATRIAL RATE: 75 BPM
EKG P AXIS: 41 DEGREES
EKG P-R INTERVAL: 174 MS
EKG Q-T INTERVAL: 380 MS
EKG QRS DURATION: 88 MS
EKG QTC CALCULATION (BAZETT): 424 MS
EKG R AXIS: 29 DEGREES
EKG T AXIS: 16 DEGREES
EKG VENTRICULAR RATE: 75 BPM

## 2020-11-08 PROCEDURE — 93005 ELECTROCARDIOGRAM TRACING: CPT | Performed by: EMERGENCY MEDICINE

## 2020-11-08 PROCEDURE — 2500000003 HC RX 250 WO HCPCS

## 2020-11-08 PROCEDURE — 93010 ELECTROCARDIOGRAM REPORT: CPT | Performed by: INTERNAL MEDICINE

## 2020-11-08 PROCEDURE — 71045 X-RAY EXAM CHEST 1 VIEW: CPT

## 2020-11-08 PROCEDURE — 96375 TX/PRO/DX INJ NEW DRUG ADDON: CPT

## 2020-11-08 PROCEDURE — 6360000002 HC RX W HCPCS

## 2020-11-08 PROCEDURE — 99284 EMERGENCY DEPT VISIT MOD MDM: CPT

## 2020-11-08 PROCEDURE — 96374 THER/PROPH/DIAG INJ IV PUSH: CPT

## 2020-11-08 RX ORDER — DIPHENHYDRAMINE HYDROCHLORIDE 50 MG/ML
50 INJECTION INTRAMUSCULAR; INTRAVENOUS ONCE
Status: DISCONTINUED | OUTPATIENT
Start: 2020-11-08 | End: 2020-11-08 | Stop reason: HOSPADM

## 2020-11-08 RX ORDER — DIPHENHYDRAMINE HYDROCHLORIDE 50 MG/ML
50 INJECTION INTRAMUSCULAR; INTRAVENOUS ONCE
Status: COMPLETED | OUTPATIENT
Start: 2020-11-08 | End: 2020-11-08

## 2020-11-08 RX ORDER — METHYLPREDNISOLONE SODIUM SUCCINATE 125 MG/2ML
INJECTION, POWDER, LYOPHILIZED, FOR SOLUTION INTRAMUSCULAR; INTRAVENOUS
Status: COMPLETED
Start: 2020-11-08 | End: 2020-11-08

## 2020-11-08 RX ORDER — METHYLPREDNISOLONE SODIUM SUCCINATE 125 MG/2ML
125 INJECTION, POWDER, LYOPHILIZED, FOR SOLUTION INTRAMUSCULAR; INTRAVENOUS ONCE
Status: COMPLETED | OUTPATIENT
Start: 2020-11-08 | End: 2020-11-08

## 2020-11-08 RX ORDER — METHYLPREDNISOLONE SODIUM SUCCINATE 125 MG/2ML
125 INJECTION, POWDER, LYOPHILIZED, FOR SOLUTION INTRAMUSCULAR; INTRAVENOUS ONCE
Status: DISCONTINUED | OUTPATIENT
Start: 2020-11-08 | End: 2020-11-08 | Stop reason: HOSPADM

## 2020-11-08 RX ORDER — DIPHENHYDRAMINE HYDROCHLORIDE 50 MG/ML
INJECTION INTRAMUSCULAR; INTRAVENOUS
Status: COMPLETED
Start: 2020-11-08 | End: 2020-11-08

## 2020-11-08 RX ADMIN — DIPHENHYDRAMINE HYDROCHLORIDE 50 MG: 50 INJECTION, SOLUTION INTRAMUSCULAR; INTRAVENOUS at 12:50

## 2020-11-08 RX ADMIN — METHYLPREDNISOLONE SODIUM SUCCINATE 125 MG: 125 INJECTION, POWDER, FOR SOLUTION INTRAMUSCULAR; INTRAVENOUS at 12:50

## 2020-11-08 RX ADMIN — DIPHENHYDRAMINE HYDROCHLORIDE 50 MG: 50 INJECTION INTRAMUSCULAR; INTRAVENOUS at 12:50

## 2020-11-08 RX ADMIN — FAMOTIDINE 20 MG: 10 INJECTION INTRAVENOUS at 12:50

## 2020-11-08 RX ADMIN — METHYLPREDNISOLONE SODIUM SUCCINATE 125 MG: 125 INJECTION, POWDER, LYOPHILIZED, FOR SOLUTION INTRAMUSCULAR; INTRAVENOUS at 12:50

## 2020-11-08 NOTE — ED PROVIDER NOTES
evidence of peritonsillar abscess. No tongue swelling or lip swelling. No soft palate elevation or evidence of shelton's. No stridor or trismus. No nuchal rigidity. Neck: Supple, full ROM,   Pulmonary: Lungs clear to auscultation bilaterally, no wheezes, rales, or rhonchi. Not in respiratory distress  Cardiovascular:  Regular rate and rhythm, no murmurs, gallops, or rubs. 2+ distal pulses  Abdomen: Soft, non tender, non distended,   Extremities: Moves all extremities x 4. Warm and well perfused  Skin: warm and dry without rash. Small insect sting to left shoulder with mild local reaction  Neurologic: GCS 15, no focal motor or sensory deficits   Psych: Normal Affect      ------------------------------ ED COURSE/MEDICAL DECISION MAKING----------------------  Medications   methylPREDNISolone sodium (SOLU-MEDROL) injection 125 mg (has no administration in time range)   famotidine (PEPCID) injection 20 mg (has no administration in time range)   diphenhydrAMINE (BENADRYL) injection 50 mg (has no administration in time range)   methylPREDNISolone sodium (SOLU-MEDROL) injection 125 mg (125 mg Intravenous Given 11/8/20 1250)   famotidine (PEPCID) injection 20 mg (20 mg Intravenous Given 11/8/20 1250)   diphenhydrAMINE (BENADRYL) injection 50 mg (50 mg Intravenous Given 11/8/20 1250)       Medical Decision Making/ED COURSE:   Patient is a 49-year-old female presenting after bee sting with shortness of breath. In the ED, patient was hemodynamically stable and afebrile. On exam, nontoxic with no signs of anaphylaxis. Patient was placed on the cardiac monitor. I interpreted findings. Rhythm - sinus. Patient administered solumedrol, pepcid, and benadryl. Observed in the ED for several hours and remained asymptomatic after treatment with no evidence of respiratory distress. She is stable for discharge home. Supportive care instructions and strict ED return precaution discussed.     Patient remained hemodynamically stable throughout ED course. ED Course as of Nov 08 1445   Angella Orona Nov 08, 2020   1330 EKG: This EKG is signed and interpreted by me. Rate: 75  Rhythm: Sinus  Interpretation: Normal sinus rhythm, normal CT interval, normal QRS, normal QT interval, no acute ST or T wave changes  Comparison: no previous EKG available        [JA]   1331 Patient feels improved on re-evaluation. Will continue to monitor. [JA]   9065 On reevaluation, patient is resting comfortably in no acute distress. She continues to have clear lung sounds and no evidence of throat, lip, or tongue swelling. Is appropriate for discharge home. [JA]      ED Course User Index  [JA] Lawyer Terrie MD           Counseling: The emergency provider has spoken with the patient and discussed todays results, in addition to providing specific details for the plan of care and counseling regarding the diagnosis and prognosis. Questions are answered at this time and they are agreeable with the plan.      --------------------------------- IMPRESSION AND DISPOSITION ---------------------------------    IMPRESSION  1. Allergic reaction, initial encounter    2. Bee sting, accidental or unintentional, initial encounter        DISPOSITION  Disposition: Discharge to home  Patient condition is stable      NOTE: This report was transcribed using voice recognition software.  Every effort was made to ensure accuracy; however, inadvertent computerized transcription errors may be present    I, Lawyer Terrie MD, am the primary provider of this record       Lawyer Terrie MD  11/08/20 5775

## 2021-01-04 ENCOUNTER — OFFICE VISIT (OUTPATIENT)
Dept: ENDOCRINOLOGY | Age: 66
End: 2021-01-04
Payer: MEDICARE

## 2021-01-04 VITALS
SYSTOLIC BLOOD PRESSURE: 122 MMHG | DIASTOLIC BLOOD PRESSURE: 70 MMHG | WEIGHT: 147 LBS | BODY MASS INDEX: 23.55 KG/M2 | HEART RATE: 73 BPM | OXYGEN SATURATION: 99 % | TEMPERATURE: 97.3 F

## 2021-01-04 DIAGNOSIS — E04.2 MULTINODULAR GOITER: ICD-10-CM

## 2021-01-04 DIAGNOSIS — E04.9 GOITER: Primary | ICD-10-CM

## 2021-01-04 DIAGNOSIS — E55.9 VITAMIN D DEFICIENCY: ICD-10-CM

## 2021-01-04 PROCEDURE — 4040F PNEUMOC VAC/ADMIN/RCVD: CPT | Performed by: INTERNAL MEDICINE

## 2021-01-04 PROCEDURE — G8427 DOCREV CUR MEDS BY ELIG CLIN: HCPCS | Performed by: INTERNAL MEDICINE

## 2021-01-04 PROCEDURE — 1123F ACP DISCUSS/DSCN MKR DOCD: CPT | Performed by: INTERNAL MEDICINE

## 2021-01-04 PROCEDURE — G8420 CALC BMI NORM PARAMETERS: HCPCS | Performed by: INTERNAL MEDICINE

## 2021-01-04 PROCEDURE — G8482 FLU IMMUNIZE ORDER/ADMIN: HCPCS | Performed by: INTERNAL MEDICINE

## 2021-01-04 PROCEDURE — 99204 OFFICE O/P NEW MOD 45 MIN: CPT | Performed by: INTERNAL MEDICINE

## 2021-01-04 PROCEDURE — 1090F PRES/ABSN URINE INCON ASSESS: CPT | Performed by: INTERNAL MEDICINE

## 2021-01-04 PROCEDURE — G8400 PT W/DXA NO RESULTS DOC: HCPCS | Performed by: INTERNAL MEDICINE

## 2021-01-04 PROCEDURE — 1036F TOBACCO NON-USER: CPT | Performed by: INTERNAL MEDICINE

## 2021-01-04 PROCEDURE — 3017F COLORECTAL CA SCREEN DOC REV: CPT | Performed by: INTERNAL MEDICINE

## 2021-01-04 NOTE — PROGRESS NOTES
[address] [place] [address] Department of Endocrinology Diabetes and Metabolism   [address] [facility] [address]   Phone: [phone]  Fax: [phone]    Date of Service: 1/4/2021    Primary Care Physician: Marjorie Hopkins MD  Referring physician: Heriberto Quintanilla MD  Provider: Elizabeth Sanchez MD            Reason for the visit:  Multinodular goiter       History of Present Illness: The history is provided by the patient. No  was used. Accuracy of the patient data is excellent. Rell Crowe is a very pleasant 77 y.o. female seen today for evaluation and management of multinodular goiter     The patient was found to have enlarged thyroid on a routine physical examination 4-5 years   Thyroid US 7/2020   Rt lobe measures 4.3 x 1 x 1.5 cm --> 5 mm nodule   Lt lobe measures 3.1 x 0.8 x 1.3 cm --> 4 mm cystic nodule   Isthmus 3 mm --> no nodules     Rell Crowe denies any new lumps, bumps in her neck, voice change, or shortness of breath. No family history of thyroid cancer. No prior history of radiation to head or neck region. 7/2020 - T4 1.3, TSH1.76     On multivitamin daily (inclusing 1000U mandeep vitD)     PAST MEDICAL HISTORY   Past Medical History:   Diagnosis Date    Acid reflux     Allergic rhinitis     Asthma     ALLERY INDUCED    Diverticulosis     Irritable bowel     Pinched nerve in neck     resolved    Staph aureus infection     UTI (urinary tract infection) 06/20/2015    resolved       PAST SURGICAL HISTORY   Past Surgical History:   Procedure Laterality Date    BREAST SURGERY      cyst    CHOLECYSTECTOMY  07/14/2015    laparoscopic    CYST REMOVAL      behind right ear    DENTAL SURGERY      HERNIA REPAIR      MANDIBLE SURGERY      NASAL FRACTURE SURGERY  1975    NOSE SURGERY  1976    TONSILLECTOMY      TUBAL LIGATION         SOCIAL HISTORY   Tobacco:   reports that she has quit smoking. She has a 10.00 pack-year smoking history.  She has never used smokeless tobacco.  Alcohol:   reports current alcohol use. Drugs:   reports no history of drug use. FAMILY HISTORY   Family History   Problem Relation Age of Onset    Cirrhosis Mother     Alzheimer's Disease Father        ALLERGIES AND DRUG REACTIONS   Allergies   Allergen Reactions    Lipitor [Atorvastatin] Nausea And Vomiting     violent n/v    Bentyl [Dicyclomine Hcl] Other (See Comments)     Dizzy      Chlorhexidine Gluconate Other (See Comments)     \" asthma attack\"    Ciprofloxacin Other (See Comments)     Extreme dizziness    Codeine      States cannot take, \"it's like speed\"     Crestor [Rosuvastatin] Other (See Comments)     Legs ache    Entex [Ami-Hubert] Other (See Comments)     HEADACHES    Florastor [Yeast]     Levaquin [Levofloxacin In D5w]      Arm pain in joints      Levsin [Hyoscyamine Sulfate] Other (See Comments)     dizzy    Librax [Chlordiazepoxide-Clidinium] Other (See Comments)     dizzy    Morphine      Headache      Reglan [Metoclopramide] Other (See Comments)     Very dizzy    Septra [Bactrim] Other (See Comments)     Unsure of rxn    Singulair [Montelukast Sodium]     Xyzal [Levocetirizine]      asthma    Zetia [Ezetimibe] Other (See Comments)     \"feels like someone poured acid down my stomach\"    Adhesive Tape Rash     Blisters with prolong contact, > 24 hours; skin peels    Doxycycline Calcium Hives and Rash     ANY CYCLINES    Oxytetracycline Hives and Rash    Sumycin [Tetracycline Hcl] Hives and Rash    Tetracyclines & Related Hives and Rash       CURRENT MEDICATIONS   Current Outpatient Medications   Medication Sig Dispense Refill    hydrocortisone 2.5 % cream Apply topically 2 times daily Apply topically 2 times daily. 1 Tube 5    aspirin 81 MG tablet Take 81 mg by mouth every other day      Multiple Vitamins-Minerals (WOMENS MULTIVITAMIN PO) Take by mouth      hydrocortisone (ANUSOL-HC) 2.5 % rectal cream Place rectally 2 times daily.  1 Tube 5    Lactobacillus (ULTIMATE PROBIOTIC FORMULA) CAPS Take 1 capsule by mouth daily      Ibuprofen-diphenhydrAMINE HCl (ADVIL PM) 200-25 MG CAPS Take 2 tablets by mouth as needed      loratadine (CLARITIN) 10 MG tablet Take 5 mg by mouth daily      esomeprazole (NEXIUM) 20 MG CPDR Take 1 capsule by mouth daily      spironolactone (ALDACTONE) 25 MG tablet Take 25 mg by mouth daily        No current facility-administered medications for this visit. Review of Systems  Constitutional: No fever, no chills, no diaphoresis, no generalized weakness. HEENT: No blurred vision, No sore throat, no ear pain, no hair loss  Neck: denied any neck swelling, difficulty swallowing,   Cadrdiopulomary: No CP, SOB or palpitation, No orthopnea or PND. No cough or wheezing. GI: No N/V/D, no constipation, No abdominal pain, no melena or hematochezia   : Denied any dysuria, hematuria, flank pain, discharge, or incontinence. Skin: denied any rash, ulcer, Hirsute, or hyperpigmentation. MSK: denied any joint deformity, joint pain/swelling, muscle pain, or back pain. Neuro: no numbess, no tingling, no weakness,     OBJECTIVE    /70   Pulse 73   Temp 97.3 °F (36.3 °C)   Wt 147 lb (66.7 kg)   SpO2 99%   BMI 23.55 kg/m²   BP Readings from Last 4 Encounters:   01/04/21 122/70   12/02/20 120/70   11/08/20 136/69   07/07/20 108/60     Wt Readings from Last 6 Encounters:   01/04/21 147 lb (66.7 kg)   12/02/20 148 lb (67.1 kg)   11/08/20 145 lb (65.8 kg)   07/07/20 143 lb (64.9 kg)   06/28/20 140 lb (63.5 kg)   06/09/20 148 lb (67.1 kg)       Physical examination:  General: awake alert, oriented x3, no abnormal position or movements. HEENT: normocephalic non traumatic  Neck: supple, no LN enlargement, mild thyromegaly, I couldn't palpate any thyroid nodules, no thyroid tenderness, no JVD. Pulm: Clear equal air entry no added sounds, no wheezing or rhonchi    CVS: S1 + S2, no murmur, no heave.  Dorsalis pedis pulse palpable repeat in June/2021   · With positive FH of graves disease (mother) will obtain thyroid Abs     vitD deficiency   · Continue vitD supplement  · Check level     I personally reviewed external notes from PCP and other patient's care team providers, and personally interpreted labs associated with the above diagnosis. I also ordered labs to further assess and manage the above addressed medical conditions. Return in about 1 year (around 1/4/2022) for Multinodular goiter . The above issues were reviewed with the patient who understood and agreed with the plan. Thank you for allowing us to participate in the care of this patient. Please do not hesitate to contact us with any additional questions. Diagnosis Orders   1. Goiter  TSH without Reflex    T4, Free    Thyroid AB    Thyroid Stimulating Immunoglobulin   2. Multinodular goiter  TSH without Reflex    T4, Free   3. Vitamin D deficiency  Vitamin D 25 Hydroxy     Artur Henriquez MD  Endocrinologist, [facility])   [address] [place] [address], [address] [place] [address],[facility] [id number]   Phone: [phone]  Fax: [phone]  ------------------------------  An electronic signature was used to authenticate this note.  Melissa Becker MD on 1/4/2021 at 10:57 AM

## 2021-05-16 ENCOUNTER — APPOINTMENT (OUTPATIENT)
Dept: CT IMAGING | Age: 66
End: 2021-05-16
Payer: MEDICARE

## 2021-05-16 ENCOUNTER — HOSPITAL ENCOUNTER (EMERGENCY)
Age: 66
Discharge: HOME | End: 2021-05-16
Attending: EMERGENCY MEDICINE
Payer: MEDICARE

## 2021-05-16 ENCOUNTER — APPOINTMENT (OUTPATIENT)
Dept: GENERAL RADIOLOGY | Age: 66
End: 2021-05-16
Payer: MEDICARE

## 2021-05-16 VITALS
RESPIRATION RATE: 18 BRPM | OXYGEN SATURATION: 100 % | TEMPERATURE: 98.8 F | SYSTOLIC BLOOD PRESSURE: 135 MMHG | DIASTOLIC BLOOD PRESSURE: 69 MMHG | HEART RATE: 68 BPM

## 2021-05-16 DIAGNOSIS — R20.2 ARM PARESTHESIA, LEFT: ICD-10-CM

## 2021-05-16 DIAGNOSIS — R53.1 GENERAL WEAKNESS: Primary | ICD-10-CM

## 2021-05-16 DIAGNOSIS — M54.2 NECK PAIN: ICD-10-CM

## 2021-05-16 LAB
ALBUMIN SERPL-MCNC: 4.3 G/DL (ref 3.5–5.2)
ALP BLD-CCNC: 95 U/L (ref 35–104)
ALT SERPL-CCNC: 21 U/L (ref 0–32)
ANION GAP SERPL CALCULATED.3IONS-SCNC: 8 MMOL/L (ref 7–16)
AST SERPL-CCNC: 31 U/L (ref 0–31)
BACTERIA: ABNORMAL /HPF
BASOPHILS ABSOLUTE: 0.08 E9/L (ref 0–0.2)
BASOPHILS RELATIVE PERCENT: 1.3 % (ref 0–2)
BILIRUB SERPL-MCNC: 0.3 MG/DL (ref 0–1.2)
BILIRUBIN URINE: NEGATIVE
BLOOD, URINE: NEGATIVE
BUN BLDV-MCNC: 14 MG/DL (ref 6–23)
CALCIUM SERPL-MCNC: 9.4 MG/DL (ref 8.6–10.2)
CHLORIDE BLD-SCNC: 106 MMOL/L (ref 98–107)
CLARITY: CLEAR
CO2: 26 MMOL/L (ref 22–29)
COLOR: YELLOW
CREAT SERPL-MCNC: 0.6 MG/DL (ref 0.5–1)
EKG ATRIAL RATE: 70 BPM
EKG P AXIS: 10 DEGREES
EKG P-R INTERVAL: 160 MS
EKG Q-T INTERVAL: 400 MS
EKG QRS DURATION: 84 MS
EKG QTC CALCULATION (BAZETT): 432 MS
EKG R AXIS: 73 DEGREES
EKG T AXIS: 19 DEGREES
EKG VENTRICULAR RATE: 70 BPM
EOSINOPHILS ABSOLUTE: 0.19 E9/L (ref 0.05–0.5)
EOSINOPHILS RELATIVE PERCENT: 3 % (ref 0–6)
GFR AFRICAN AMERICAN: >60
GFR NON-AFRICAN AMERICAN: >60 ML/MIN/1.73
GLUCOSE BLD-MCNC: 96 MG/DL (ref 74–99)
GLUCOSE URINE: NEGATIVE MG/DL
HCT VFR BLD CALC: 43.4 % (ref 34–48)
HEMOGLOBIN: 14.1 G/DL (ref 11.5–15.5)
IMMATURE GRANULOCYTES #: 0.02 E9/L
IMMATURE GRANULOCYTES %: 0.3 % (ref 0–5)
KETONES, URINE: NEGATIVE MG/DL
LEUKOCYTE ESTERASE, URINE: ABNORMAL
LYMPHOCYTES ABSOLUTE: 1.51 E9/L (ref 1.5–4)
LYMPHOCYTES RELATIVE PERCENT: 23.9 % (ref 20–42)
MCH RBC QN AUTO: 29.5 PG (ref 26–35)
MCHC RBC AUTO-ENTMCNC: 32.5 % (ref 32–34.5)
MCV RBC AUTO: 90.8 FL (ref 80–99.9)
MONOCYTES ABSOLUTE: 0.58 E9/L (ref 0.1–0.95)
MONOCYTES RELATIVE PERCENT: 9.2 % (ref 2–12)
NEUTROPHILS ABSOLUTE: 3.95 E9/L (ref 1.8–7.3)
NEUTROPHILS RELATIVE PERCENT: 62.3 % (ref 43–80)
NITRITE, URINE: NEGATIVE
PDW BLD-RTO: 12.9 FL (ref 11.5–15)
PH UA: 5.5 (ref 5–9)
PLATELET # BLD: 231 E9/L (ref 130–450)
PMV BLD AUTO: 9.9 FL (ref 7–12)
POTASSIUM REFLEX MAGNESIUM: 5.1 MMOL/L (ref 3.5–5)
PROTEIN UA: NEGATIVE MG/DL
RBC # BLD: 4.78 E12/L (ref 3.5–5.5)
RBC UA: ABNORMAL /HPF (ref 0–2)
SODIUM BLD-SCNC: 140 MMOL/L (ref 132–146)
SPECIFIC GRAVITY UA: <=1.005 (ref 1–1.03)
T4 FREE: 1.48 NG/DL (ref 0.93–1.7)
TOTAL PROTEIN: 6.9 G/DL (ref 6.4–8.3)
TROPONIN: <0.01 NG/ML (ref 0–0.03)
TSH SERPL DL<=0.05 MIU/L-ACNC: 1.25 UIU/ML (ref 0.27–4.2)
UROBILINOGEN, URINE: 0.2 E.U./DL
WBC # BLD: 6.3 E9/L (ref 4.5–11.5)
WBC UA: ABNORMAL /HPF (ref 0–5)

## 2021-05-16 PROCEDURE — 96375 TX/PRO/DX INJ NEW DRUG ADDON: CPT

## 2021-05-16 PROCEDURE — 80053 COMPREHEN METABOLIC PANEL: CPT

## 2021-05-16 PROCEDURE — 71275 CT ANGIOGRAPHY CHEST: CPT

## 2021-05-16 PROCEDURE — 96374 THER/PROPH/DIAG INJ IV PUSH: CPT

## 2021-05-16 PROCEDURE — 70450 CT HEAD/BRAIN W/O DYE: CPT

## 2021-05-16 PROCEDURE — 93010 ELECTROCARDIOGRAM REPORT: CPT | Performed by: INTERNAL MEDICINE

## 2021-05-16 PROCEDURE — 72125 CT NECK SPINE W/O DYE: CPT

## 2021-05-16 PROCEDURE — 2580000003 HC RX 258: Performed by: STUDENT IN AN ORGANIZED HEALTH CARE EDUCATION/TRAINING PROGRAM

## 2021-05-16 PROCEDURE — 84484 ASSAY OF TROPONIN QUANT: CPT

## 2021-05-16 PROCEDURE — 96372 THER/PROPH/DIAG INJ SC/IM: CPT

## 2021-05-16 PROCEDURE — 99285 EMERGENCY DEPT VISIT HI MDM: CPT

## 2021-05-16 PROCEDURE — 81001 URINALYSIS AUTO W/SCOPE: CPT

## 2021-05-16 PROCEDURE — 6360000004 HC RX CONTRAST MEDICATION: Performed by: RADIOLOGY

## 2021-05-16 PROCEDURE — 6360000002 HC RX W HCPCS: Performed by: STUDENT IN AN ORGANIZED HEALTH CARE EDUCATION/TRAINING PROGRAM

## 2021-05-16 PROCEDURE — 84443 ASSAY THYROID STIM HORMONE: CPT

## 2021-05-16 PROCEDURE — 93005 ELECTROCARDIOGRAM TRACING: CPT | Performed by: STUDENT IN AN ORGANIZED HEALTH CARE EDUCATION/TRAINING PROGRAM

## 2021-05-16 PROCEDURE — 84439 ASSAY OF FREE THYROXINE: CPT

## 2021-05-16 PROCEDURE — 71045 X-RAY EXAM CHEST 1 VIEW: CPT

## 2021-05-16 PROCEDURE — 85025 COMPLETE CBC W/AUTO DIFF WBC: CPT

## 2021-05-16 RX ORDER — ORPHENADRINE CITRATE 100 MG/1
100 TABLET, EXTENDED RELEASE ORAL 2 TIMES DAILY
Qty: 20 TABLET | Refills: 0 | Status: SHIPPED | OUTPATIENT
Start: 2021-05-16 | End: 2021-05-26

## 2021-05-16 RX ORDER — DEXAMETHASONE 4 MG/1
4 TABLET ORAL 2 TIMES DAILY WITH MEALS
Qty: 10 TABLET | Refills: 0 | Status: SHIPPED | OUTPATIENT
Start: 2021-05-16 | End: 2021-05-21

## 2021-05-16 RX ORDER — DIPHENHYDRAMINE HYDROCHLORIDE 50 MG/ML
12.5 INJECTION INTRAMUSCULAR; INTRAVENOUS ONCE
Status: COMPLETED | OUTPATIENT
Start: 2021-05-16 | End: 2021-05-16

## 2021-05-16 RX ORDER — 0.9 % SODIUM CHLORIDE 0.9 %
1000 INTRAVENOUS SOLUTION INTRAVENOUS ONCE
Status: COMPLETED | OUTPATIENT
Start: 2021-05-16 | End: 2021-05-16

## 2021-05-16 RX ORDER — ORPHENADRINE CITRATE 30 MG/ML
60 INJECTION INTRAMUSCULAR; INTRAVENOUS ONCE
Status: COMPLETED | OUTPATIENT
Start: 2021-05-16 | End: 2021-05-16

## 2021-05-16 RX ORDER — KETOROLAC TROMETHAMINE 30 MG/ML
15 INJECTION, SOLUTION INTRAMUSCULAR; INTRAVENOUS ONCE
Status: COMPLETED | OUTPATIENT
Start: 2021-05-16 | End: 2021-05-16

## 2021-05-16 RX ADMIN — ORPHENADRINE CITRATE 60 MG: 30 INJECTION INTRAMUSCULAR; INTRAVENOUS at 12:46

## 2021-05-16 RX ADMIN — KETOROLAC TROMETHAMINE 15 MG: 30 INJECTION, SOLUTION INTRAMUSCULAR; INTRAVENOUS at 10:57

## 2021-05-16 RX ADMIN — DIPHENHYDRAMINE HYDROCHLORIDE 12.5 MG: 50 INJECTION INTRAMUSCULAR; INTRAVENOUS at 12:47

## 2021-05-16 RX ADMIN — SODIUM CHLORIDE 1000 ML: 9 INJECTION, SOLUTION INTRAVENOUS at 10:58

## 2021-05-16 RX ADMIN — IOPAMIDOL 75 ML: 755 INJECTION, SOLUTION INTRAVENOUS at 12:56

## 2021-05-16 ASSESSMENT — PAIN DESCRIPTION - FREQUENCY: FREQUENCY: CONTINUOUS

## 2021-05-16 ASSESSMENT — PAIN SCALES - GENERAL
PAINLEVEL_OUTOF10: 6
PAINLEVEL_OUTOF10: 8

## 2021-05-16 ASSESSMENT — PAIN DESCRIPTION - DESCRIPTORS: DESCRIPTORS: THROBBING;ACHING

## 2021-05-16 ASSESSMENT — PAIN DESCRIPTION - PAIN TYPE: TYPE: ACUTE PAIN

## 2021-05-16 ASSESSMENT — PAIN DESCRIPTION - LOCATION: LOCATION: ARM

## 2021-05-19 ASSESSMENT — ENCOUNTER SYMPTOMS
ABDOMINAL DISTENTION: 0
DIARRHEA: 0
SORE THROAT: 0
COUGH: 1
PHOTOPHOBIA: 0
BACK PAIN: 0
VOMITING: 0
SHORTNESS OF BREATH: 1
SINUS PRESSURE: 0
NAUSEA: 0
WHEEZING: 0

## 2021-05-19 NOTE — ED PROVIDER NOTES
along the C8 aspect of the left arm, no focal neurological deficits, muscle strength 5 out of 5 to bilateral upper and lower extremities, sensation intact to bilateral lower extremities, no facial droop, speech is clear, no dysarthria. Psychiatric:         Mood and Affect: Mood normal.          Procedures   EKG #1  Interpreted by emergency department physician unless otherwise noted. Time:  1027  Rate: 70  Rhythm: Sinus. Interpretation: EKG reviewed demonstrated normal sinus rhythm, rate 70, normal axis, , no acute ST segment changes. Comparison: stable as compared to patient's most recent EKG   MDM  Number of Diagnoses or Management Options  Arm paresthesia, left  General weakness  Neck pain  Diagnosis management comments: Patient is a 78-year-old female past medical history of asthma, IBS and GERD. Patient presents with chief complaint of neck pain, fatigue left arm weakness. Vital signs stable on presentation. On physical exam heart regular rate and rhythm, lungs clear to auscultation bilaterally, abdomen soft nontender. On neurological exam, there is mild paraspinal cervical spinal tenderness on the left, no midline C-spine tenderness, no step-offs or deformities, there is mildly decreased sensation in the C8 dermatome with a positive Tinel's sign over the cubital tunnel, no other focal neurological deficits. EKG obtained demonstrated normal sinus rhythm no acute ischemic changes. Laboratory obtained CBC unremarkable, CMP unremarkable, urinalysis not indicative of infection. Troponin less than 0.01, TSH 1.25, free T4 1.48. CT scan of the head without contrast was obtained demonstrate no acute abnormality CT scan of cervical spine demonstrated chronic degenerative changes, chest x-ray obtained no acute abnormalities. Due to excessive fatigue decision made to obtain CTA to rule out PE CTA obtained no evidence of PE or acute pulmonary abnormalities.  Findings consistent with generalized fatigue as well [metoclopramide], Septra [bactrim], Singulair [montelukast sodium], Xyzal [levocetirizine], Zetia [ezetimibe], Adhesive tape, Doxycycline calcium, Oxytetracycline, Sumycin [tetracycline hcl], and Tetracyclines & related    -------------------------------------------------- RESULTS -------------------------------------------------  Labs:  Results for orders placed or performed during the hospital encounter of 05/16/21   CBC Auto Differential   Result Value Ref Range    WBC 6.3 4.5 - 11.5 E9/L    RBC 4.78 3.50 - 5.50 E12/L    Hemoglobin 14.1 11.5 - 15.5 g/dL    Hematocrit 43.4 34.0 - 48.0 %    MCV 90.8 80.0 - 99.9 fL    MCH 29.5 26.0 - 35.0 pg    MCHC 32.5 32.0 - 34.5 %    RDW 12.9 11.5 - 15.0 fL    Platelets 129 838 - 555 E9/L    MPV 9.9 7.0 - 12.0 fL    Neutrophils % 62.3 43.0 - 80.0 %    Immature Granulocytes % 0.3 0.0 - 5.0 %    Lymphocytes % 23.9 20.0 - 42.0 %    Monocytes % 9.2 2.0 - 12.0 %    Eosinophils % 3.0 0.0 - 6.0 %    Basophils % 1.3 0.0 - 2.0 %    Neutrophils Absolute 3.95 1.80 - 7.30 E9/L    Immature Granulocytes # 0.02 E9/L    Lymphocytes Absolute 1.51 1.50 - 4.00 E9/L    Monocytes Absolute 0.58 0.10 - 0.95 E9/L    Eosinophils Absolute 0.19 0.05 - 0.50 E9/L    Basophils Absolute 0.08 0.00 - 0.20 E9/L   Comprehensive Metabolic Panel w/ Reflex to MG   Result Value Ref Range    Sodium 140 132 - 146 mmol/L    Potassium reflex Magnesium 5.1 (H) 3.5 - 5.0 mmol/L    Chloride 106 98 - 107 mmol/L    CO2 26 22 - 29 mmol/L    Anion Gap 8 7 - 16 mmol/L    Glucose 96 74 - 99 mg/dL    BUN 14 6 - 23 mg/dL    CREATININE 0.6 0.5 - 1.0 mg/dL    GFR Non-African American >60 >=60 mL/min/1.73    GFR African American >60     Calcium 9.4 8.6 - 10.2 mg/dL    Total Protein 6.9 6.4 - 8.3 g/dL    Albumin 4.3 3.5 - 5.2 g/dL    Total Bilirubin 0.3 0.0 - 1.2 mg/dL    Alkaline Phosphatase 95 35 - 104 U/L    ALT 21 0 - 32 U/L    AST 31 0 - 31 U/L   Troponin   Result Value Ref Range    Troponin <0.01 0.00 - 0.03 ng/mL   Urinalysis, reflex to microscopic   Result Value Ref Range    Color, UA Yellow Straw/Yellow    Clarity, UA Clear Clear    Glucose, Ur Negative Negative mg/dL    Bilirubin Urine Negative Negative    Ketones, Urine Negative Negative mg/dL    Specific Gravity, UA <=1.005 1.005 - 1.030    Blood, Urine Negative Negative    pH, UA 5.5 5.0 - 9.0    Protein, UA Negative Negative mg/dL    Urobilinogen, Urine 0.2 <2.0 E.U./dL    Nitrite, Urine Negative Negative    Leukocyte Esterase, Urine SMALL (A) Negative   TSH WITHOUT REFLEX   Result Value Ref Range    TSH 1.250 0.270 - 4.200 uIU/mL   T4, FREE   Result Value Ref Range    T4 Free 1.48 0.93 - 1.70 ng/dL   Microscopic Urinalysis   Result Value Ref Range    WBC, UA 5-10 (A) 0 - 5 /HPF    RBC, UA 0-1 0 - 2 /HPF    Bacteria, UA FEW (A) None Seen /HPF   EKG 12 Lead   Result Value Ref Range    Ventricular Rate 70 BPM    Atrial Rate 70 BPM    P-R Interval 160 ms    QRS Duration 84 ms    Q-T Interval 400 ms    QTc Calculation (Bazett) 432 ms    P Axis 10 degrees    R Axis 73 degrees    T Axis 19 degrees       Radiology:  CTA PULMONARY W CONTRAST   Final Result   No evidence of pulmonary embolism or acute pulmonary abnormality. CT Head WO Contrast   Final Result   1. No acute intracranial abnormality. 2.  No acute osseous abnormality seen in the head and cervical spine. CT CERVICAL SPINE WO CONTRAST   Final Result   1. No acute intracranial abnormality. 2.  No acute osseous abnormality seen in the head and cervical spine. XR CHEST PORTABLE   Final Result   No pneumonia or pleural effusion.             ------------------------- NURSING NOTES AND VITALS REVIEWED ---------------------------  Date / Time Roomed:  5/16/2021  9:36 AM  ED Bed Assignment:  06/06    The nursing notes within the ED encounter and vital signs as below have been reviewed.    /69   Pulse 68   Temp 98.8 °F (37.1 °C) (Oral)   Resp 18   SpO2 100%   Oxygen Saturation Interpretation: normal    ------------------------------------------ PROGRESS NOTES ------------------------------------------  11:27 AM EDT  I have spoken with the patient and discussed todays results, in addition to providing specific details for the plan of care and counseling regarding the diagnosis and prognosis. Their questions are answered at this time and they are agreeable with the plan. I discussed at length with them reasons for immediate return here for re evaluation. They will followup with their primary care physician by calling their office on Monday.      --------------------------------- ADDITIONAL PROVIDER NOTES ---------------------------------  At this time the patient is without objective evidence of an acute process requiring hospitalization or inpatient management. They have remained hemodynamically stable throughout their entire ED visit and are stable for discharge with outpatient follow-up. The plan has been discussed in detail and they are aware of the specific conditions for emergent return, as well as the importance of follow-up. Discharge Medication List as of 5/16/2021  3:57 PM      START taking these medications    Details   dexamethasone (DECADRON) 4 MG tablet Take 1 tablet by mouth 2 times daily (with meals) for 5 days, Disp-10 tablet, R-0Print      orphenadrine (NORFLEX) 100 MG extended release tablet Take 1 tablet by mouth 2 times daily for 10 days, Disp-20 tablet, R-0Print             Diagnosis:  1. General weakness    2. Neck pain    3. Arm paresthesia, left        Disposition:  Patient's disposition: Discharge to home  Patient's condition is stable. Patient was seen and evaluated by myself and my attending  Assessment and Plan discussed with attending provider, please see attestation for final plan of care.      DO Jean Paul Barnes,   Resident  05/19/21 [id number]

## 2021-05-25 ENCOUNTER — HOSPITAL ENCOUNTER (OUTPATIENT)
Age: 66
Discharge: HOME | End: 2021-05-25
Payer: MEDICARE

## 2021-05-25 DIAGNOSIS — E04.2 MULTINODULAR GOITER: ICD-10-CM

## 2021-05-25 DIAGNOSIS — E55.9 VITAMIN D DEFICIENCY: ICD-10-CM

## 2021-05-25 DIAGNOSIS — E04.9 GOITER: ICD-10-CM

## 2021-05-25 DIAGNOSIS — E78.2 MIXED HYPERLIPIDEMIA: ICD-10-CM

## 2021-05-25 LAB
ALBUMIN SERPL-MCNC: 4.6 G/DL (ref 3.5–5.2)
ALP BLD-CCNC: 107 U/L (ref 35–104)
ALT SERPL-CCNC: 18 U/L (ref 0–32)
ANION GAP SERPL CALCULATED.3IONS-SCNC: 9 MMOL/L (ref 7–16)
AST SERPL-CCNC: 20 U/L (ref 0–31)
BILIRUB SERPL-MCNC: 0.5 MG/DL (ref 0–1.2)
BUN BLDV-MCNC: 12 MG/DL (ref 6–23)
CALCIUM SERPL-MCNC: 9.5 MG/DL (ref 8.6–10.2)
CHLORIDE BLD-SCNC: 102 MMOL/L (ref 98–107)
CHOLESTEROL, TOTAL: 230 MG/DL (ref 0–199)
CO2: 26 MMOL/L (ref 22–29)
CREAT SERPL-MCNC: 0.8 MG/DL (ref 0.5–1)
GFR AFRICAN AMERICAN: >60
GFR NON-AFRICAN AMERICAN: >60 ML/MIN/1.73
GLUCOSE BLD-MCNC: 86 MG/DL (ref 74–99)
HCT VFR BLD CALC: 44.4 % (ref 34–48)
HDLC SERPL-MCNC: 74 MG/DL
HEMOGLOBIN: 15.1 G/DL (ref 11.5–15.5)
LDL CHOLESTEROL CALCULATED: 137 MG/DL (ref 0–99)
MCH RBC QN AUTO: 30.8 PG (ref 26–35)
MCHC RBC AUTO-ENTMCNC: 34 % (ref 32–34.5)
MCV RBC AUTO: 90.4 FL (ref 80–99.9)
PDW BLD-RTO: 13.1 FL (ref 11.5–15)
PLATELET # BLD: 206 E9/L (ref 130–450)
PMV BLD AUTO: 9.9 FL (ref 7–12)
POTASSIUM SERPL-SCNC: 4.2 MMOL/L (ref 3.5–5)
RBC # BLD: 4.91 E12/L (ref 3.5–5.5)
SODIUM BLD-SCNC: 137 MMOL/L (ref 132–146)
T4 FREE: 1.47 NG/DL (ref 0.93–1.7)
TOTAL PROTEIN: 7.5 G/DL (ref 6.4–8.3)
TRIGL SERPL-MCNC: 97 MG/DL (ref 0–149)
TSH SERPL DL<=0.05 MIU/L-ACNC: 1.6 UIU/ML (ref 0.27–4.2)
VITAMIN D 25-HYDROXY: 45 NG/ML (ref 30–100)
VLDLC SERPL CALC-MCNC: 19 MG/DL
WBC # BLD: 5.7 E9/L (ref 4.5–11.5)

## 2021-05-25 PROCEDURE — 82306 VITAMIN D 25 HYDROXY: CPT

## 2021-05-25 PROCEDURE — 85027 COMPLETE CBC AUTOMATED: CPT

## 2021-05-25 PROCEDURE — 84445 ASSAY OF TSI GLOBULIN: CPT

## 2021-05-25 PROCEDURE — 84443 ASSAY THYROID STIM HORMONE: CPT

## 2021-05-25 PROCEDURE — 80061 LIPID PANEL: CPT

## 2021-05-25 PROCEDURE — 84439 ASSAY OF FREE THYROXINE: CPT

## 2021-05-25 PROCEDURE — 36415 COLL VENOUS BLD VENIPUNCTURE: CPT

## 2021-05-25 PROCEDURE — 86800 THYROGLOBULIN ANTIBODY: CPT

## 2021-05-25 PROCEDURE — 80053 COMPREHEN METABOLIC PANEL: CPT

## 2021-05-25 PROCEDURE — 86376 MICROSOMAL ANTIBODY EACH: CPT

## 2021-05-27 LAB
THYROGLOBULIN AB: <0.9 IU/ML (ref 0–4)
THYROID PEROXIDASE (TPO) ABS: 2.2 IU/ML (ref 0–9)

## 2021-05-28 ENCOUNTER — HOSPITAL ENCOUNTER (EMERGENCY)
Age: 66
Discharge: HOME | End: 2021-05-28
Attending: EMERGENCY MEDICINE
Payer: MEDICARE

## 2021-05-28 ENCOUNTER — TELEPHONE (OUTPATIENT)
Dept: ENDOCRINOLOGY | Age: 66
End: 2021-05-28

## 2021-05-28 VITALS
TEMPERATURE: 97.1 F | DIASTOLIC BLOOD PRESSURE: 74 MMHG | OXYGEN SATURATION: 99 % | SYSTOLIC BLOOD PRESSURE: 146 MMHG | WEIGHT: 145 LBS | RESPIRATION RATE: 14 BRPM | HEART RATE: 75 BPM | HEIGHT: 66 IN | BODY MASS INDEX: 23.3 KG/M2

## 2021-05-28 DIAGNOSIS — N30.01 ACUTE CYSTITIS WITH HEMATURIA: Primary | ICD-10-CM

## 2021-05-28 LAB
BACTERIA: ABNORMAL /HPF
BILIRUBIN URINE: NEGATIVE
BLOOD, URINE: ABNORMAL
CLARITY: CLEAR
COLOR: YELLOW
EPITHELIAL CELLS, UA: ABNORMAL /HPF
GLUCOSE URINE: NEGATIVE MG/DL
KETONES, URINE: 15 MG/DL
LEUKOCYTE ESTERASE, URINE: ABNORMAL
NITRITE, URINE: NEGATIVE
PH UA: 6.5 (ref 5–9)
PROTEIN UA: NEGATIVE MG/DL
RBC UA: ABNORMAL /HPF (ref 0–2)
SPECIFIC GRAVITY UA: 1.01 (ref 1–1.03)
THYROID STIMULATING IMMUNOGLOBULIN: <0.1 IU/L
UROBILINOGEN, URINE: 0.2 E.U./DL
WBC UA: >20 /HPF (ref 0–5)

## 2021-05-28 PROCEDURE — 6370000000 HC RX 637 (ALT 250 FOR IP): Performed by: EMERGENCY MEDICINE

## 2021-05-28 PROCEDURE — 87186 SC STD MICRODIL/AGAR DIL: CPT

## 2021-05-28 PROCEDURE — 81001 URINALYSIS AUTO W/SCOPE: CPT

## 2021-05-28 PROCEDURE — 87088 URINE BACTERIA CULTURE: CPT

## 2021-05-28 PROCEDURE — 99282 EMERGENCY DEPT VISIT SF MDM: CPT

## 2021-05-28 PROCEDURE — 87077 CULTURE AEROBIC IDENTIFY: CPT

## 2021-05-28 RX ORDER — CEFDINIR 300 MG/1
300 CAPSULE ORAL ONCE
Status: COMPLETED | OUTPATIENT
Start: 2021-05-28 | End: 2021-05-28

## 2021-05-28 RX ORDER — CEFDINIR 300 MG/1
300 CAPSULE ORAL 2 TIMES DAILY
Qty: 13 CAPSULE | Refills: 0 | Status: SHIPPED | OUTPATIENT
Start: 2021-05-28 | End: 2021-06-04

## 2021-05-28 RX ADMIN — CEFDINIR 300 MG: 300 CAPSULE ORAL at 20:21

## 2021-05-28 ASSESSMENT — PAIN DESCRIPTION - DESCRIPTORS: DESCRIPTORS: BURNING

## 2021-05-28 ASSESSMENT — PAIN DESCRIPTION - LOCATION: LOCATION: VAGINA

## 2021-05-28 ASSESSMENT — PAIN SCALES - GENERAL: PAINLEVEL_OUTOF10: 8

## 2021-05-28 ASSESSMENT — PAIN DESCRIPTION - FREQUENCY: FREQUENCY: CONTINUOUS

## 2021-05-28 NOTE — TELEPHONE ENCOUNTER
Notify pt,  I have reviewed your recent results    Great!, thyroid hormones results are within an acceptable range of normal.

## 2021-05-29 NOTE — ED PROVIDER NOTES
HPI:  5/28/21, Time: 8:15 PM EDT         Trevor Emmanuel is a 77 y.o. female presenting to the ED for dysuria beginning suddenly at 3 PM today. Symptoms have been moderate in severity and constant. No exacerbating or alleviating factors. She did not take anything for her symptoms. Reports associated symptoms of urinary frequency and urgency. Also says she has some mild lower abdominal discomfort. Reports occasional mild pain in her back but none currently. No fevers, nausea, vomiting, diarrhea, chest pain, shortness of breath, or cough. I reviewed the patient's chart. She has grown Klebsiella in her urine in the past sensitive to Rocephin. Review of Systems:   Pertinent positives and negatives are stated within HPI, all other systems reviewed and are negative.          --------------------------------------------- PAST HISTORY ---------------------------------------------  Past Medical History:  has a past medical history of Acid reflux, Allergic rhinitis, Asthma, Diverticulosis, Irritable bowel, Pinched nerve in neck, Staph aureus infection, and UTI (urinary tract infection). Past Surgical History:  has a past surgical history that includes hernia repair; Dental surgery; Tubal ligation; cyst removal; Tonsillectomy; Breast surgery; Nasal fracture surgery (1975); Nose surgery (1976); Cholecystectomy (07/14/2015); and Mandible surgery. Social History:  reports that she has quit smoking. She has a 10.00 pack-year smoking history. She has never used smokeless tobacco. She reports current alcohol use. She reports that she does not use drugs. Family History: family history includes Alzheimer's Disease in her father; Cirrhosis in her mother. The patients home medications have been reviewed.     Allergies: Lipitor [atorvastatin], Bentyl [dicyclomine hcl], Chlorhexidine gluconate, Ciprofloxacin, Codeine, Crestor [rosuvastatin], Entex [ami-eladio], Florastor [yeast], Levaquin [levofloxacin in d5w], Levsin I have reviewed discharge instructions with the patient and parent. The patient and parent verbalized understanding. Patient armband removed and shredded  Current Discharge Medication List      START taking these medications    Details   SUMAtriptan (IMITREX) 50 mg tablet Take 1 tablet PO for headache. May repeat in 2 hours x 1 dose if headache recurs. Not to exceed maximum of 200 mg/24 hour period.   Qty: 12 Tab, Refills: 0 [hyoscyamine sulfate], Librax [chlordiazepoxide-clidinium], Morphine, Reglan [metoclopramide], Septra [bactrim], Singulair [montelukast sodium], Xyzal [levocetirizine], Zetia [ezetimibe], Adhesive tape, Doxycycline calcium, Oxytetracycline, Sumycin [tetracycline hcl], and Tetracyclines & related    -------------------------------------------------- RESULTS -------------------------------------------------  All laboratory and radiology results have been personally reviewed by myself   LABS:  Results for orders placed or performed during the hospital encounter of 05/28/21   Urinalysis, reflex to microscopic   Result Value Ref Range    Color, UA Yellow Straw/Yellow    Clarity, UA Clear Clear    Glucose, Ur Negative Negative mg/dL    Bilirubin Urine Negative Negative    Ketones, Urine 15 (A) Negative mg/dL    Specific Gravity, UA 1.010 1.005 - 1.030    Blood, Urine MODERATE (A) Negative    pH, UA 6.5 5.0 - 9.0    Protein, UA Negative Negative mg/dL    Urobilinogen, Urine 0.2 <2.0 E.U./dL    Nitrite, Urine Negative Negative    Leukocyte Esterase, Urine MODERATE (A) Negative   Microscopic Urinalysis   Result Value Ref Range    WBC, UA >20 (A) 0 - 5 /HPF    RBC, UA 5-10 (A) 0 - 2 /HPF    Epithelial Cells, UA RARE /HPF    Bacteria, UA RARE (A) None Seen /HPF       RADIOLOGY:  Interpreted by Radiologist.  No orders to display       ------------------------- NURSING NOTES AND VITALS REVIEWED ---------------------------   The nursing notes within the ED encounter and vital signs as below have been reviewed.    BP (!) 146/74   Pulse 75   Temp 97.1 °F (36.2 °C) (Oral)   Resp 14   Ht 5' 6\" (1.676 m)   Wt 145 lb (65.8 kg)   SpO2 99%   BMI 23.40 kg/m²   Oxygen Saturation Interpretation: Normal      ---------------------------------------------------PHYSICAL EXAM--------------------------------------      Constitutional/General: Alert and oriented x3, well appearing, non toxic in NAD  Head: Normocephalic and atraumatic  Mouth: Oropharynx clear, handling secretions, no trismus  Neck: Supple, full ROM,   Pulmonary: Lungs clear to auscultation bilaterally, no wheezes, rales, or rhonchi. Not in respiratory distress  Cardiovascular:  Regular rate and rhythm, no murmurs, gallops, or rubs. 2+ distal pulses  Abdomen: Soft, non tender, non distended,   Extremities: Moves all extremities x 4. Warm and well perfused  Skin: warm and dry without rash  Neurologic: GCS 15, no focal motor or sensory deficits   Psych: Normal Affect. Behavior normal.      ------------------------------ ED COURSE/MEDICAL DECISION MAKING----------------------  Medications   cefdinir (OMNICEF) capsule 300 mg (300 mg Oral Given 5/28/21 2021)       Medical Decision Making/ED COURSE:   Patient is a 24-year-old female presenting with dysuria and urinary frequency. She was hemodynamically stable and afebrile in the ED. She was well-appearing with a benign abdominal exam.  Urinalysis appeared infected. Urine culture sent. Reviewed prior urine cultures. Will be placed on Omnicef. Advised follow-up with her PCP for final urine culture results. Supportive care measures and strict ED return precautions discussed. Patient remained hemodynamically stable throughout ED course. Counseling: The emergency provider has spoken with the patient and discussed todays results, in addition to providing specific details for the plan of care and counseling regarding the diagnosis and prognosis. Questions are answered at this time and they are agreeable with the plan.      --------------------------------- IMPRESSION AND DISPOSITION ---------------------------------    IMPRESSION  1. Acute cystitis with hematuria        DISPOSITION  Disposition: Discharge to home  Patient condition is stable      NOTE: This report was transcribed using voice recognition software.  Every effort was made to ensure accuracy; however, inadvertent computerized transcription errors may be present    Jojo Ham MD, am the primary provider of this record       Valentine Kwon MD  05/28/21 [id number]

## 2021-05-29 NOTE — ED NOTES
Discharge instructions given, medications and follow up instructions reviewed. Patient verbalized understanding, no other noted or stated problems at this time. Patient will follow up with physicians as directed.         Sharyn Escalante RN  05/28/21 2029

## 2021-05-30 LAB
ORGANISM: ABNORMAL
URINE CULTURE, ROUTINE: ABNORMAL

## 2021-07-06 ENCOUNTER — OFFICE VISIT (OUTPATIENT)
Dept: NEUROLOGY | Age: 66
End: 2021-07-06
Payer: MEDICARE

## 2021-07-06 VITALS
BODY MASS INDEX: 23.3 KG/M2 | HEART RATE: 70 BPM | TEMPERATURE: 96.6 F | DIASTOLIC BLOOD PRESSURE: 81 MMHG | WEIGHT: 145 LBS | HEIGHT: 66 IN | RESPIRATION RATE: 16 BRPM | SYSTOLIC BLOOD PRESSURE: 124 MMHG | OXYGEN SATURATION: 97 %

## 2021-07-06 DIAGNOSIS — R20.0 LEFT ARM NUMBNESS: ICD-10-CM

## 2021-07-06 DIAGNOSIS — R51.9 WORSENING HEADACHES: ICD-10-CM

## 2021-07-06 DIAGNOSIS — R93.7 ABNORMAL MRI, CERVICAL SPINE: ICD-10-CM

## 2021-07-06 DIAGNOSIS — R25.1 TREMORS OF NERVOUS SYSTEM: Primary | ICD-10-CM

## 2021-07-06 DIAGNOSIS — M54.12 CERVICAL RADICULOPATHY: ICD-10-CM

## 2021-07-06 PROCEDURE — 99204 OFFICE O/P NEW MOD 45 MIN: CPT | Performed by: PSYCHIATRY & NEUROLOGY

## 2021-07-06 PROCEDURE — G8420 CALC BMI NORM PARAMETERS: HCPCS | Performed by: PSYCHIATRY & NEUROLOGY

## 2021-07-06 PROCEDURE — 1036F TOBACCO NON-USER: CPT | Performed by: PSYCHIATRY & NEUROLOGY

## 2021-07-06 PROCEDURE — 1123F ACP DISCUSS/DSCN MKR DOCD: CPT | Performed by: PSYCHIATRY & NEUROLOGY

## 2021-07-06 PROCEDURE — G8427 DOCREV CUR MEDS BY ELIG CLIN: HCPCS | Performed by: PSYCHIATRY & NEUROLOGY

## 2021-07-06 PROCEDURE — G8400 PT W/DXA NO RESULTS DOC: HCPCS | Performed by: PSYCHIATRY & NEUROLOGY

## 2021-07-06 PROCEDURE — 3017F COLORECTAL CA SCREEN DOC REV: CPT | Performed by: PSYCHIATRY & NEUROLOGY

## 2021-07-06 PROCEDURE — 1090F PRES/ABSN URINE INCON ASSESS: CPT | Performed by: PSYCHIATRY & NEUROLOGY

## 2021-07-06 PROCEDURE — 4040F PNEUMOC VAC/ADMIN/RCVD: CPT | Performed by: PSYCHIATRY & NEUROLOGY

## 2021-07-06 ASSESSMENT — ENCOUNTER SYMPTOMS
VOMITING: 0
NAUSEA: 0
TROUBLE SWALLOWING: 0
PHOTOPHOBIA: 0
SHORTNESS OF BREATH: 0

## 2021-07-06 NOTE — PROGRESS NOTES
NEUROLOGY NEW PATIENT NOTE     Date: 7/6/2021  Name: Geoff Lazo  MRN: [id number]  Patient's PCP: Darrel Lee MD     Dear, Dr. Darrel Lee MD    REASON FOR VISIT/CHIEF COMPLAINT: The patient is coming in with multiple complaints of tremors, neck pain, left shoulder pain, numbness and tingling in the left hand and headaches. HISTORY OF PRESENT ILLNESS: Geoff Lazo is a 77 y.o.  female with a past medical history of anxiety, asthma is coming in with longstanding tremors. Tremor     Onset: Many years ago has been closely getting worse since last 3 years.  Location: Bilateral hands right greater than left   Severity: Mild to moderate the patient notices tremors mostly   Context: With action and intention, no tremors at rest.   Aggravating factors: Writing, holding, doing fine motor tasks   Reliving factors: None   Associated signs and symptoms: Left arm numbness and headaches.  At rest: No   On action: Yes   Head tremor: No   Voice tremor: No   Jaw tremor: No   Presence of a discomfort or pulling sensation in the part of the body that is shaking: No   Slowing of movements: No no   Loss of smell or taste: no   Interferes with ADL's: No   Falls: No   Effect of EtOH on tremor: Unknown   Associated with medication use: Yes, prednisone   Family history of shaking or tremor: Father   Treatments tried: None    The patient reports that she also has been having headaches, although cranially, 6/10 intermittent which started after her Covid 19 vaccine. She also reports ongoing left arm numbness which has been getting worse. I have personally reviewed her medical records. I have personally reviewed the images of CT scan films   CT head: No acute abnormality    CT cervical spine: No acute abnormality.       PAST MEDICAL HISTORY:   Past Medical History:   Diagnosis Date    Acid reflux     Allergic rhinitis     Asthma     ALLERY INDUCED    Diverticulosis     Irritable bowel Meetings:     Marital Status:    Intimate Partner Violence:     Fear of Current or Ex-Partner:     Emotionally Abused:     Physically Abused:     Sexually Abused:       E-Cigarettes/Vaping Use     Questions Responses    E-Cigarette/Vaping Use Never User    Start Date     Passive Exposure     Quit Date     Counseling Given     Comments          Allergy:   Allergies   Allergen Reactions    Lipitor [Atorvastatin] Nausea And Vomiting     violent n/v    Bentyl [Dicyclomine Hcl] Other (See Comments)     Dizzy      Chlorhexidine Gluconate Other (See Comments)     \" asthma attack\"    Ciprofloxacin Other (See Comments)     Extreme dizziness    Codeine      States cannot take, \"it's like speed\"     Crestor [Rosuvastatin] Other (See Comments)     Legs ache    Entex [Ami-Hubert] Other (See Comments)     HEADACHES    Florastor [Yeast]     Levaquin [Levofloxacin In D5w]      Arm pain in joints      Levsin [Hyoscyamine Sulfate] Other (See Comments)     dizzy    Librax [Chlordiazepoxide-Clidinium] Other (See Comments)     dizzy    Morphine      Headache      Reglan [Metoclopramide] Other (See Comments)     Very dizzy    Septra [Bactrim] Other (See Comments)     Unsure of rxn    Singulair [Montelukast Sodium]     Xyzal [Levocetirizine]      asthma    Zetia [Ezetimibe] Other (See Comments)     \"feels like someone poured acid down my stomach\"    Adhesive Tape Rash     Blisters with prolong contact, > 24 hours; skin peels    Doxycycline Calcium Hives and Rash     ANY CYCLINES    Oxytetracycline Hives and Rash    Sumycin [Tetracycline Hcl] Hives and Rash    Tetracyclines & Related Hives and Rash     MEDS:   Current Outpatient Medications:     Phenazopyridine HCl (AZO URINARY PAIN RELIEF PO), Take by mouth, Disp: , Rfl:     predniSONE (DELTASONE) 10 MG tablet, Take 1 tablet by mouth daily for 10 days, Disp: 10 tablet, Rfl: 0    LORazepam (ATIVAN) 0.5 MG tablet, Take 1 tablet by mouth every 6 hours as needed for Anxiety for up to 30 days. , Disp: 40 tablet, Rfl: 0    predniSONE (DELTASONE) 10 MG tablet, 30 mg x 4 days,20 mg x 4 days ,10 mg x 4 days, 5mg (One half tablet) x 4 days, Disp: 26 tablet, Rfl: 0    nystatin (MYCOSTATIN) 588573 UNIT/ML suspension, Take 5 mLs by mouth 4 times daily, Disp: 2 Bottle, Rfl: 1    aspirin 81 MG tablet, Take 81 mg by mouth every other day, Disp: , Rfl:     Multiple Vitamins-Minerals (WOMENS MULTIVITAMIN PO), Take by mouth, Disp: , Rfl:     Lactobacillus (ULTIMATE PROBIOTIC FORMULA) CAPS, Take 1 capsule by mouth daily, Disp: , Rfl:     Ibuprofen-diphenhydrAMINE HCl (ADVIL PM) 200-25 MG CAPS, Take 2 tablets by mouth as needed, Disp: , Rfl:     esomeprazole (NEXIUM) 20 MG CPDR, Take 1 capsule by mouth daily, Disp: , Rfl:     spironolactone (ALDACTONE) 25 MG tablet, Take 25 mg by mouth daily , Disp: , Rfl:     Cetirizine HCl (ZYRTEC ALLERGY PO), Take by mouth (Patient not taking: Reported on 7/6/2021), Disp: , Rfl:     hydrocortisone 2.5 % cream, Apply topically 2 times daily Apply topically 2 times daily. (Patient not taking: Reported on 7/6/2021), Disp: 1 Tube, Rfl: 5    hydrocortisone (ANUSOL-HC) 2.5 % rectal cream, Place rectally 2 times daily. (Patient not taking: Reported on 7/6/2021), Disp: 1 Tube, Rfl: 5    REVIEW OF SYSTEMS  Review of Systems   Constitutional: Negative for appetite change, fatigue and unexpected weight change. HENT: Negative for drooling, hearing loss, tinnitus and trouble swallowing. Eyes: Negative for photophobia and visual disturbance. Respiratory: Negative for shortness of breath. Cardiovascular: Negative for palpitations. Gastrointestinal: Negative for nausea and vomiting. Endocrine: Negative for polyuria. Genitourinary: Negative for flank pain. Musculoskeletal: Positive for neck pain. Negative for neck stiffness. Skin: Negative for rash. Allergic/Immunologic: Negative for food allergies.    Neurological: Positive for tremors, numbness and headaches. Negative for dizziness, seizures, syncope, speech difficulty, weakness and light-headedness. Hematological: Negative for adenopathy. Psychiatric/Behavioral: Negative for agitation, behavioral problems and sleep disturbance. PHYSICAL EXAM:   /81   Pulse 70   Temp 96.6 °F (35.9 °C) (Temporal)   Resp 16   Ht 5' 6\" (1.676 m)   Wt 145 lb (65.8 kg)   SpO2 97%   BMI 23.40 kg/m²      Neurological examination     MENTAL STATUS: Patient awake and oriented to time, place, and person. Recent/remote memory normal. Attention span/concentration normal. Speech fluent. Good comprehension, naming, and repetition. Fund of knowledge appropriate for patient's level of education. Affect normal.    CRANIAL NERVES:  CN I: Not tested. CN II: Fundoscopic exam not performed. CN III, IV, VI: Pupils equal, round and reactive to light; extra ocular movements full and intact. CN V: Facial sensation normal.  CN VII: No facial asymmetry. CN VIII:  Hearing grossly normal bilaterally. No pathologic nystagmus or skew deviation. CN IX, X: Palate elevates symmetrically. CN XI: Shoulder shrug and chin rotation equal with intact strength. CN XII: Tongue protrusion midline. MOTOR: Normal bulk. Tone normal and symmetric throughout. Strength 5/5 throughout. ABNORMAL MOVEMENTS/TREMORS: Yes  4 to 6 Hz tremor mostly on action and intention, mild tremor on posture, no tremor at rest.    REFLEXES: DTRs 2+; normal and symmetric throughout. Plantar response downgoing. SENSATION: Sensation grossly intact to fine touch, pain/temperature, vibration and position. COORDINATION: Finger-to-nose and heel to shin normal for age and symmetric. Finger tapping and alternating movements normal.    STATION: Negative Romberg. GAIT:  Normal heel, toe and tandem; no ataxia. Multiple trigger points noted in the left trapezius muscle.     DIAGNOSTIC TESTS:     I have personally reviewed the most recent lab results:    Sodium   Date Value Ref Range Status   05/25/2021 137 132 - 146 mmol/L Final   05/16/2021 140 132 - 146 mmol/L Final   05/05/2020 140 132 - 146 mmol/L Final     Potassium   Date Value Ref Range Status   05/25/2021 4.2 3.5 - 5.0 mmol/L Final   05/04/2020 4.1 3.5 - 5.0 mmol/L Final   05/03/2020 4.4 3.5 - 5.0 mmol/L Final     Potassium reflex Magnesium   Date Value Ref Range Status   05/16/2021 5.1 (H) 3.5 - 5.0 mmol/L Final     Comment:     Specimen is moderately Hemolyzed. Result may be artificially increased. 05/05/2020 4.4 3.5 - 5.0 mmol/L Final     Chloride   Date Value Ref Range Status   05/25/2021 102 98 - 107 mmol/L Final   05/16/2021 106 98 - 107 mmol/L Final   05/05/2020 106 98 - 107 mmol/L Final     CO2   Date Value Ref Range Status   05/25/2021 26 22 - 29 mmol/L Final   05/16/2021 26 22 - 29 mmol/L Final   05/05/2020 19 (L) 22 - 29 mmol/L Final     BUN   Date Value Ref Range Status   05/25/2021 12 6 - 23 mg/dL Final   05/16/2021 14 6 - 23 mg/dL Final   05/05/2020 11 8 - 23 mg/dL Final     CREATININE   Date Value Ref Range Status   05/25/2021 0.8 0.5 - 1.0 mg/dL Final   05/16/2021 0.6 0.5 - 1.0 mg/dL Final   05/05/2020 0.6 0.5 - 1.0 mg/dL Final     GFR Non-   Date Value Ref Range Status   05/25/2021 >60 >=60 mL/min/1.73 Final     Comment:     Chronic Kidney Disease: less than 60 ml/min/1.73 sq.m. Kidney Failure: less than 15 ml/min/1.73 sq.m. Results valid for patients 18 years and older. 05/16/2021 >60 >=60 mL/min/1.73 Final     Comment:     Chronic Kidney Disease: less than 60 ml/min/1.73 sq.m. Kidney Failure: less than 15 ml/min/1.73 sq.m. Results valid for patients 18 years and older. 05/05/2020 >60 >=60 mL/min/1.73 Final     Comment:     Chronic Kidney Disease: less than 60 ml/min/1.73 sq.m. Kidney Failure: less than 15 ml/min/1.73 sq.m. Results valid for patients 18 years and older.        Calcium   Date Value Ref Range Status 05/25/2021 9.5 8.6 - 10.2 mg/dL Final   05/16/2021 9.4 8.6 - 10.2 mg/dL Final   05/05/2020 10.1 8.6 - 10.2 mg/dL Final     Magnesium   Date Value Ref Range Status   05/14/2019 1.9 mg/dL Final     WBC   Date Value Ref Range Status   05/25/2021 5.7 4.5 - 11.5 E9/L Final   05/16/2021 6.3 4.5 - 11.5 E9/L Final   05/05/2020 7.4 4.5 - 11.5 E9/L Final     Hemoglobin   Date Value Ref Range Status   05/25/2021 15.1 11.5 - 15.5 g/dL Final   05/16/2021 14.1 11.5 - 15.5 g/dL Final   11/18/2020 14.70 12.0 - 16.0 g/dL Final     Hematocrit   Date Value Ref Range Status   05/25/2021 44.4 34.0 - 48.0 % Final   05/16/2021 43.4 34.0 - 48.0 % Final   11/18/2020 43.5 36 - 46 % Final     Platelets   Date Value Ref Range Status   05/25/2021 206 130 - 450 E9/L Final   05/16/2021 231 130 - 450 E9/L Final   05/05/2020 246 130 - 450 E9/L Final     Neutrophils %   Date Value Ref Range Status   05/16/2021 62.3 43.0 - 80.0 % Final   05/05/2020 67.6 43.0 - 80.0 % Final   05/04/2020 54.9 43.0 - 80.0 % Final     Monocytes %   Date Value Ref Range Status   05/16/2021 9.2 2.0 - 12.0 % Final   05/05/2020 7.2 2.0 - 12.0 % Final   05/04/2020 8.6 2.0 - 12.0 % Final     Total Protein   Date Value Ref Range Status   05/25/2021 7.5 6.4 - 8.3 g/dL Final   05/16/2021 6.9 6.4 - 8.3 g/dL Final   05/05/2020 7.8 6.4 - 8.3 g/dL Final     Total Bilirubin   Date Value Ref Range Status   05/25/2021 0.5 0.0 - 1.2 mg/dL Final   05/16/2021 0.3 0.0 - 1.2 mg/dL Final   05/05/2020 0.2 0.0 - 1.2 mg/dL Final     Alkaline Phosphatase   Date Value Ref Range Status   05/25/2021 107 (H) 35 - 104 U/L Final   05/16/2021 95 35 - 104 U/L Final   11/18/2020 110 U/L Final     ALT   Date Value Ref Range Status   05/25/2021 18 0 - 32 U/L Final   05/16/2021 21 0 - 32 U/L Final   05/05/2020 21 0 - 32 U/L Final     AST   Date Value Ref Range Status   05/25/2021 20 0 - 31 U/L Final   05/16/2021 31 0 - 31 U/L Final     Comment:     Specimen is moderately Hemolyzed.  Result may be artificially increased. 05/05/2020 26 0 - 31 U/L Final     Comment:     Specimen is slightly Hemolyzed. Result may be artificially increased. Lab Results   Component Value Date    INR 1.0 09/03/2016     Lab Results   Component Value Date    TRIG 97 05/25/2021    HDL 74 05/25/2021     No components found for: HGBA1C  No results found for: PROTEINCSF, GLUCCSF, WBCCSF    Controlled Substance Monitoring:    Acute and Chronic Pain Monitoring:   RX Monitoring 4/24/2020   Attestation -   Periodic Controlled Substance Monitoring No signs of potential drug abuse or diversion identified. MEDICAL DECISION MAKING  ASSESSMENT/PLAN    Demar Villatoro was seen today for tremors. Diagnoses and all orders for this visit:    Tremors of nervous system    Worsening headaches    Left arm numbness    Abnormal MRI, cervical spine    Cervical radiculopathy    Anxiety          -     MRI BRAIN WO CONTRAST; Future  -     MRI CERVICAL SPINE WO CONTRAST; Future  -     EMG; Future    · The patient is coming in with complaints of tremors which started many years ago and has been getting worse for last 3 years significantly worsened after the COVID-19 vaccine. This is associated with worsening headache and arm numbness. The patient also has underlying anxiety for which she is taking Ativan which at times also helps her tremors. · At this time her symptoms of underlying tremors most likely secondary to an underlying essential tremor with enhancement on prednisone. Currently the tremors are not affecting her activities of daily living. She also has underlying asthma so we will avoid using any beta-blockers. At this time we will continue to monitor the tremors. Check MRI brain to rule out any underlying cerebellar lesions. · Left arm numbness, neck pain appears to be secondary to underlying cervical foraminal stenosis causing an underlying cervical radiculopathy. Check MRI cervical spine and EMG bilateral upper extremities.   · For underlying anxiety the patient has been on longstanding Ativan. Consider psychiatry consultation for optimal control of underlying anxiety. Return in about 3 months (around 10/6/2021). Thank you for involving me in the care of your patient. Today, I personally spent a great amount of time directly face-to-face time with the patient, of which greater than 50% was spent in patient education, counseling,about etiology management and diagnosis of cervical radiculopathy, essential tremor, headache. Side effects of medications including were discussed in detail with the patient, verbalizes understanding and agrees to it. And coordination of care as described above. Patient's current medication list, allergies, problem list and results of all previously ordered testing and scans were reviewed at today's visit.       Suman Thurman MD    [facility] Neurology  [address] [place] [address]

## 2021-07-09 ENCOUNTER — HOSPITAL ENCOUNTER (EMERGENCY)
Age: 66
Discharge: HOME | End: 2021-07-09
Payer: MEDICARE

## 2021-07-09 ENCOUNTER — APPOINTMENT (OUTPATIENT)
Dept: CT IMAGING | Age: 66
End: 2021-07-09
Payer: MEDICARE

## 2021-07-09 VITALS
BODY MASS INDEX: 23.46 KG/M2 | HEART RATE: 80 BPM | HEIGHT: 66 IN | DIASTOLIC BLOOD PRESSURE: 81 MMHG | WEIGHT: 146 LBS | SYSTOLIC BLOOD PRESSURE: 144 MMHG | RESPIRATION RATE: 14 BRPM | TEMPERATURE: 98.1 F | OXYGEN SATURATION: 96 %

## 2021-07-09 DIAGNOSIS — K57.32 DIVERTICULITIS OF COLON: Primary | ICD-10-CM

## 2021-07-09 LAB
ALBUMIN SERPL-MCNC: 4.3 G/DL (ref 3.5–5.2)
ALP BLD-CCNC: 91 U/L (ref 35–104)
ALT SERPL-CCNC: 23 U/L (ref 0–32)
ANION GAP SERPL CALCULATED.3IONS-SCNC: 10 MMOL/L (ref 7–16)
AST SERPL-CCNC: 21 U/L (ref 0–31)
BACTERIA: ABNORMAL /HPF
BASOPHILS ABSOLUTE: 0.07 E9/L (ref 0–0.2)
BASOPHILS RELATIVE PERCENT: 0.9 % (ref 0–2)
BILIRUB SERPL-MCNC: 0.2 MG/DL (ref 0–1.2)
BILIRUBIN URINE: NEGATIVE
BLOOD, URINE: NEGATIVE
BUN BLDV-MCNC: 14 MG/DL (ref 6–23)
CALCIUM SERPL-MCNC: 9.3 MG/DL (ref 8.6–10.2)
CHLORIDE BLD-SCNC: 105 MMOL/L (ref 98–107)
CLARITY: CLEAR
CO2: 25 MMOL/L (ref 22–29)
COLOR: YELLOW
CREAT SERPL-MCNC: 0.7 MG/DL (ref 0.5–1)
EOSINOPHILS ABSOLUTE: 0.11 E9/L (ref 0.05–0.5)
EOSINOPHILS RELATIVE PERCENT: 1.4 % (ref 0–6)
GFR AFRICAN AMERICAN: >60
GFR NON-AFRICAN AMERICAN: >60 ML/MIN/1.73
GLUCOSE BLD-MCNC: 85 MG/DL (ref 74–99)
GLUCOSE URINE: NEGATIVE MG/DL
HCT VFR BLD CALC: 47.9 % (ref 34–48)
HEMOGLOBIN: 15.3 G/DL (ref 11.5–15.5)
IMMATURE GRANULOCYTES #: 0.03 E9/L
IMMATURE GRANULOCYTES %: 0.4 % (ref 0–5)
KETONES, URINE: NEGATIVE MG/DL
LACTIC ACID, SEPSIS: 1.1 MMOL/L (ref 0.5–1.9)
LEUKOCYTE ESTERASE, URINE: ABNORMAL
LYMPHOCYTES ABSOLUTE: 1.8 E9/L (ref 1.5–4)
LYMPHOCYTES RELATIVE PERCENT: 22.8 % (ref 20–42)
MCH RBC QN AUTO: 29.8 PG (ref 26–35)
MCHC RBC AUTO-ENTMCNC: 31.9 % (ref 32–34.5)
MCV RBC AUTO: 93.4 FL (ref 80–99.9)
MONOCYTES ABSOLUTE: 0.85 E9/L (ref 0.1–0.95)
MONOCYTES RELATIVE PERCENT: 10.8 % (ref 2–12)
NEUTROPHILS ABSOLUTE: 5.03 E9/L (ref 1.8–7.3)
NEUTROPHILS RELATIVE PERCENT: 63.7 % (ref 43–80)
NITRITE, URINE: NEGATIVE
PDW BLD-RTO: 13.5 FL (ref 11.5–15)
PH UA: 6 (ref 5–9)
PLATELET # BLD: 251 E9/L (ref 130–450)
PMV BLD AUTO: 9.3 FL (ref 7–12)
POTASSIUM REFLEX MAGNESIUM: 4.3 MMOL/L (ref 3.5–5)
PROTEIN UA: NEGATIVE MG/DL
RBC # BLD: 5.13 E12/L (ref 3.5–5.5)
RBC UA: ABNORMAL /HPF (ref 0–2)
SODIUM BLD-SCNC: 140 MMOL/L (ref 132–146)
SPECIFIC GRAVITY UA: <=1.005 (ref 1–1.03)
TOTAL PROTEIN: 7.2 G/DL (ref 6.4–8.3)
UROBILINOGEN, URINE: 0.2 E.U./DL
WBC # BLD: 7.9 E9/L (ref 4.5–11.5)
WBC UA: ABNORMAL /HPF (ref 0–5)

## 2021-07-09 PROCEDURE — 87088 URINE BACTERIA CULTURE: CPT

## 2021-07-09 PROCEDURE — 74177 CT ABD & PELVIS W/CONTRAST: CPT

## 2021-07-09 PROCEDURE — 6360000004 HC RX CONTRAST MEDICATION: Performed by: RADIOLOGY

## 2021-07-09 PROCEDURE — 99284 EMERGENCY DEPT VISIT MOD MDM: CPT

## 2021-07-09 PROCEDURE — 6370000000 HC RX 637 (ALT 250 FOR IP): Performed by: NURSE PRACTITIONER

## 2021-07-09 PROCEDURE — 85025 COMPLETE CBC W/AUTO DIFF WBC: CPT

## 2021-07-09 PROCEDURE — 81001 URINALYSIS AUTO W/SCOPE: CPT

## 2021-07-09 PROCEDURE — 83605 ASSAY OF LACTIC ACID: CPT

## 2021-07-09 PROCEDURE — 80053 COMPREHEN METABOLIC PANEL: CPT

## 2021-07-09 PROCEDURE — 6360000002 HC RX W HCPCS: Performed by: NURSE PRACTITIONER

## 2021-07-09 PROCEDURE — 2580000003 HC RX 258: Performed by: NURSE PRACTITIONER

## 2021-07-09 PROCEDURE — 96374 THER/PROPH/DIAG INJ IV PUSH: CPT

## 2021-07-09 RX ORDER — CEFDINIR 300 MG/1
300 CAPSULE ORAL EVERY 12 HOURS SCHEDULED
Status: DISCONTINUED | OUTPATIENT
Start: 2021-07-09 | End: 2021-07-09 | Stop reason: HOSPADM

## 2021-07-09 RX ORDER — METRONIDAZOLE 500 MG/1
500 TABLET ORAL ONCE
Status: COMPLETED | OUTPATIENT
Start: 2021-07-09 | End: 2021-07-09

## 2021-07-09 RX ORDER — KETOROLAC TROMETHAMINE 30 MG/ML
15 INJECTION, SOLUTION INTRAMUSCULAR; INTRAVENOUS ONCE
Status: COMPLETED | OUTPATIENT
Start: 2021-07-09 | End: 2021-07-09

## 2021-07-09 RX ORDER — CEFDINIR 300 MG/1
300 CAPSULE ORAL 2 TIMES DAILY
Qty: 20 CAPSULE | Refills: 0 | Status: SHIPPED | OUTPATIENT
Start: 2021-07-09 | End: 2021-07-19

## 2021-07-09 RX ORDER — 0.9 % SODIUM CHLORIDE 0.9 %
1000 INTRAVENOUS SOLUTION INTRAVENOUS ONCE
Status: COMPLETED | OUTPATIENT
Start: 2021-07-09 | End: 2021-07-09

## 2021-07-09 RX ORDER — METRONIDAZOLE 500 MG/1
500 TABLET ORAL 3 TIMES DAILY
Qty: 30 TABLET | Refills: 0 | Status: SHIPPED | OUTPATIENT
Start: 2021-07-09 | End: 2021-07-19

## 2021-07-09 RX ADMIN — IOPAMIDOL 75 ML: 755 INJECTION, SOLUTION INTRAVENOUS at 18:07

## 2021-07-09 RX ADMIN — SODIUM CHLORIDE 1000 ML: 9 INJECTION, SOLUTION INTRAVENOUS at 17:53

## 2021-07-09 RX ADMIN — KETOROLAC TROMETHAMINE 15 MG: 30 INJECTION, SOLUTION INTRAMUSCULAR; INTRAVENOUS at 17:54

## 2021-07-09 RX ADMIN — CEFDINIR 300 MG: 300 CAPSULE ORAL at 20:20

## 2021-07-09 RX ADMIN — METRONIDAZOLE 500 MG: 500 TABLET ORAL at 20:20

## 2021-07-09 ASSESSMENT — PAIN DESCRIPTION - FREQUENCY: FREQUENCY: INTERMITTENT

## 2021-07-09 ASSESSMENT — PAIN DESCRIPTION - LOCATION: LOCATION: FLANK

## 2021-07-09 ASSESSMENT — PAIN DESCRIPTION - DESCRIPTORS: DESCRIPTORS: SHARP;BURNING;STABBING

## 2021-07-09 ASSESSMENT — PAIN SCALES - GENERAL
PAINLEVEL_OUTOF10: 4
PAINLEVEL_OUTOF10: 4

## 2021-07-09 ASSESSMENT — PAIN DESCRIPTION - PROGRESSION: CLINICAL_PROGRESSION: GRADUALLY WORSENING

## 2021-07-09 ASSESSMENT — PAIN DESCRIPTION - ONSET: ONSET: GRADUAL

## 2021-07-09 ASSESSMENT — PAIN DESCRIPTION - ORIENTATION: ORIENTATION: LEFT

## 2021-07-09 NOTE — ED PROVIDER NOTES
[facility]  Department of Emergency Medicine   ED  Encounter Note  Admit Date/RoomTime: 2021  4:06 PM  ED Room:     NAME: Elenita Borjas  : 1955  MRN: [id number]     Chief Complaint:  Urinary Tract Infection (follows with urology and ID, on antibiotics, sent here for CT scan ) and Flank Pain (left)    History of Present Illness       Elenita Borjas is a 77 y.o. old female who presents to the emergency department for complaint of left flank pain for the past couple days. She reports that she is [presently being treated for UTI. She reports that she is under the care of ID and urology. She states that she spoke with ID and they recommended that she come to the ED for CT scan. Reports that she is presently on amoxicillin and has 3 more doses left. She reports associated lower abdominal burning. States that this has been ongoing with her UTI. States that she was treated for UTI in May and given Brian. Denies history of kidney stones. Denies fever, chills, nausea, vomiting, hematuria, constipation, melena, hematochezia, chest pain, shortness of breath, lightheadedness, dizziness, syncope, or other complaints at this time. Since onset the symptoms have been gradually worsening. The pain is aggravated by nothing in particular and relieved by nothing. Lisa Rivera ROS   Pertinent positives and negatives are stated within HPI, all other systems reviewed and are negative. Past Medical History:  has a past medical history of Acid reflux, Allergic rhinitis, Asthma, Diverticulosis, Irritable bowel, Pinched nerve in neck, Staph aureus infection, and UTI (urinary tract infection). Surgical History has a past surgical history that includes hernia repair; Dental surgery; Tubal ligation; cyst removal; Tonsillectomy; Breast surgery; Nasal fracture surgery (); Nose surgery (); Cholecystectomy (2015); and Mandible surgery.     Social History:  reports that she quit smoking about 4 years ago. She has a 10.00 pack-year smoking history. She has never used smokeless tobacco. She reports current alcohol use. She reports that she does not use drugs. Family History: family history includes Alzheimer's Disease in her father; Cirrhosis in her mother. Allergies: Lipitor [atorvastatin], Bentyl [dicyclomine hcl], Chlorhexidine gluconate, Ciprofloxacin, Codeine, Crestor [rosuvastatin], Entex [ami-eladio], Florastor [yeast], Levaquin [levofloxacin in d5w], Levsin [hyoscyamine sulfate], Librax [chlordiazepoxide-clidinium], Morphine, Reglan [metoclopramide], Septra [bactrim], Singulair [montelukast sodium], Xyzal [levocetirizine], Zetia [ezetimibe], Adhesive tape, Dexamethasone, Doxycycline calcium, Oxytetracycline, Sumycin [tetracycline hcl], and Tetracyclines & related    Physical Exam   Oxygen Saturation Interpretation: Normal on room air analysis. ED Triage Vitals [07/09/21 1351]   BP Temp Temp src Pulse Resp SpO2 Height Weight   (!) 144/81 98.1 °F (36.7 °C) -- 80 14 96 % 5' 6\" (1.676 m) 146 lb (66.2 kg)          General Appearance/Constitutional:  Alert, development consistent with age. HEENT:  NC/NT. PERRLA. Airway patent. Neck:  Supple. No lymphadenopathy. Respiratory:  No retractions. Lungs Clear to auscultation and breath sounds equal.  CV:  Regular rate and rhythm. GI:  normal appearing, non-distended with no visible hernias. Bowel sounds: normal bowel sounds. Tenderness: There is mild tenderness present - located lower abdominal pain. No guarding, rigidity, rebound tenderness. Liver: non-tender. Spleen:  non-tender. Back: CVA Tenderness: No CVA tenderness. : /Pelvic examination deferred / declined. Integument:  Normal turgor. Warm, dry, without visible rash, unless noted elsewhere. Lymphatics: No edema, cap.refill <3sec. Neurological:  Orientation age-appropriate. Motor functions intact.     Lab / Imaging Results   (All laboratory and radiology results have been personally reviewed by myself)  Labs:  Results for orders placed or performed during the hospital encounter of 07/09/21   CBC Auto Differential   Result Value Ref Range    WBC 7.9 4.5 - 11.5 E9/L    RBC 5.13 3.50 - 5.50 E12/L    Hemoglobin 15.3 11.5 - 15.5 g/dL    Hematocrit 47.9 34.0 - 48.0 %    MCV 93.4 80.0 - 99.9 fL    MCH 29.8 26.0 - 35.0 pg    MCHC 31.9 (L) 32.0 - 34.5 %    RDW 13.5 11.5 - 15.0 fL    Platelets 034 161 - 886 E9/L    MPV 9.3 7.0 - 12.0 fL    Neutrophils % 63.7 43.0 - 80.0 %    Immature Granulocytes % 0.4 0.0 - 5.0 %    Lymphocytes % 22.8 20.0 - 42.0 %    Monocytes % 10.8 2.0 - 12.0 %    Eosinophils % 1.4 0.0 - 6.0 %    Basophils % 0.9 0.0 - 2.0 %    Neutrophils Absolute 5.03 1.80 - 7.30 E9/L    Immature Granulocytes # 0.03 E9/L    Lymphocytes Absolute 1.80 1.50 - 4.00 E9/L    Monocytes Absolute 0.85 0.10 - 0.95 E9/L    Eosinophils Absolute 0.11 0.05 - 0.50 E9/L    Basophils Absolute 0.07 0.00 - 0.20 E9/L   Comprehensive Metabolic Panel w/ Reflex to MG   Result Value Ref Range    Sodium 140 132 - 146 mmol/L    Potassium reflex Magnesium 4.3 3.5 - 5.0 mmol/L    Chloride 105 98 - 107 mmol/L    CO2 25 22 - 29 mmol/L    Anion Gap 10 7 - 16 mmol/L    Glucose 85 74 - 99 mg/dL    BUN 14 6 - 23 mg/dL    CREATININE 0.7 0.5 - 1.0 mg/dL    GFR Non-African American >60 >=60 mL/min/1.73    GFR African American >60     Calcium 9.3 8.6 - 10.2 mg/dL    Total Protein 7.2 6.4 - 8.3 g/dL    Albumin 4.3 3.5 - 5.2 g/dL    Total Bilirubin 0.2 0.0 - 1.2 mg/dL    Alkaline Phosphatase 91 35 - 104 U/L    ALT 23 0 - 32 U/L    AST 21 0 - 31 U/L   Lactate, Sepsis   Result Value Ref Range    Lactic Acid, Sepsis 1.1 0.5 - 1.9 mmol/L   Urinalysis, reflex to microscopic   Result Value Ref Range    Color, UA Yellow Straw/Yellow    Clarity, UA Clear Clear    Glucose, Ur Negative Negative mg/dL    Bilirubin Urine Negative Negative    Ketones, Urine Negative Negative mg/dL    Specific Gravity, UA <=1.005 1.005 - 1.030    Blood, Urine Negative Negative    pH, UA 6.0 5.0 - 9.0    Protein, UA Negative Negative mg/dL    Urobilinogen, Urine 0.2 <2.0 E.U./dL    Nitrite, Urine Negative Negative    Leukocyte Esterase, Urine SMALL (A) Negative   Microscopic Urinalysis   Result Value Ref Range    WBC, UA 1-3 0 - 5 /HPF    RBC, UA NONE 0 - 2 /HPF    Bacteria, UA RARE (A) None Seen /HPF     Imaging: All Radiology results interpreted by Radiologist unless otherwise noted. CT ABDOMEN PELVIS W IV CONTRAST Additional Contrast? None   Final Result   There is no obstructing renal or ureteral calculus or hydronephrosis. Diffuse diverticulosis of the descending and sigmoid colon with thickening of   the sigmoid colon which could be due to spasm or uncomplicated diverticulitis. ED Course / Medical Decision Making     Medications   cefdinir (OMNICEF) capsule 300 mg (300 mg Oral Given 7/9/21 2020)   0.9 % sodium chloride bolus (0 mLs Intravenous Stopped 7/9/21 1915)   ketorolac (TORADOL) injection 15 mg (15 mg Intravenous Given 7/9/21 1754)   iopamidol (ISOVUE-370) 76 % injection 75 mL (75 mLs Intravenous Given 7/9/21 1807)   metroNIDAZOLE (FLAGYL) tablet 500 mg (500 mg Oral Given 7/9/21 2020)        Re-Evaluations:  7/9/21   CT results discussed with Dr Rudi Cordova and plan for ESVIN PERSON and   Patients condition is improving. Consultations:             None    Procedures:   none    MDM:  Rufino Martínez is a 78-year-old female who presented to the emergency department for complaint of low abdominal pain and left flank pain. She reported ongoing issues with UTI. Reported that she is presently on amoxicillin and 3 more doses remaining. No fever or chills. No nausea or vomiting. No CVA tenderness. She reported that her doctor instructed that she come in for a CT scan to rule out kidney issues. Denied history of renal calculi. CBC shows no leukocytosis, H&H stable. CMP unremarkable. Lactic acid 1.1. Urinalysis was negative for nitrite and only small leukocyte. CT abdomen completed which showed diffuse diverticulosis of the descending and sigmoid colon with thickening of the sigmoid colon which could be due to uncomplicated diverticulitis. Appendix was normal.  No renal calculi or concern for pyelonephritis. She was given IV fluids and medicated with Toradol with improvement of her symptoms. Started on and prescribed Flagyl and Omnicef. She remained hemodynamically stable, nontoxic, and appropriate for outpatient management. She was instructed to have close follow-up with her PCP and advised to return for any new, change, worsening symptoms or concerns. At this time the patient is without objective evidence of an acute process requiring hospitalization or inpatient management. They have remained hemodynamically stable throughout their entire ED visit and are stable for discharge with outpatient follow-up. The plan has been discussed in detail and they are aware of the specific conditions for emergent return, as well as the importance of follow-up. Plan of Care/Counseling:  ROCÍO Ruiz CNP reviewed today's visit with the patient in addition to providing specific details for the plan of care and counseling regarding the diagnosis and prognosis. Questions are answered at this time and are agreeable with the plan. Assessment      1. Diverticulitis of colon      This patient's ED course included: a personal history and physicial examination and re-evaluation prior to disposition  This patient has remained hemodynamically stable and improved during their ED course. Plan   Discharged home  Patient condition is good.     New Medications     Discharge Medication List as of 7/9/2021  7:53 PM      START taking these medications    Details   metroNIDAZOLE (FLAGYL) 500 MG tablet Take 1 tablet by mouth 3 times daily for 10 days, Disp-30 tablet, R-0Print      cefdinir (OMNICEF) 300 MG capsule Take 1 capsule by mouth 2 times daily for 10 days, Disp-20 capsule, R-0Print           Electronically signed by ROCÍO Rand CNP   DD: 7/9/21  **This report was transcribed using voice recognition software. Every effort was made to ensure accuracy; however, inadvertent computerized transcription errors may be present.   END OF PROVIDER NOTE      ROCÍO Rand CNP  07/09/21 1393

## 2021-07-11 LAB — URINE CULTURE, ROUTINE: NORMAL

## 2021-07-13 ENCOUNTER — OFFICE VISIT (OUTPATIENT)
Dept: NEUROLOGY | Age: 66
End: 2021-07-13
Payer: MEDICARE

## 2021-07-13 VITALS
HEART RATE: 69 BPM | HEIGHT: 66 IN | WEIGHT: 146 LBS | RESPIRATION RATE: 16 BRPM | BODY MASS INDEX: 23.46 KG/M2 | DIASTOLIC BLOOD PRESSURE: 82 MMHG | OXYGEN SATURATION: 99 % | SYSTOLIC BLOOD PRESSURE: 132 MMHG | TEMPERATURE: 98.7 F

## 2021-07-13 DIAGNOSIS — M54.12 CERVICAL RADICULOPATHY: ICD-10-CM

## 2021-07-13 PROCEDURE — 95910 NRV CNDJ TEST 7-8 STUDIES: CPT | Performed by: PSYCHIATRY & NEUROLOGY

## 2021-07-13 PROCEDURE — 95886 MUSC TEST DONE W/N TEST COMP: CPT | Performed by: PSYCHIATRY & NEUROLOGY

## 2021-07-13 NOTE — PROGRESS NOTES
[address] [facility]  Electrodiagnostic Laboratory  *Accredited by the [facility] with exemplary status  [address] [facility]  Phone: [phone]  Fax: [phone]    Referring Provider: Manuel Shah MD  Primary Care Physician: Mirlande Laird MD  Patient Name: Qamar Kelly  Patient YOB: 1955  Gender: female  BMI: Body mass index is 23.57 kg/m². Blood pressure 132/82, pulse 69, temperature 98.7 °F (37.1 °C), temperature source Temporal, resp. rate 16, height 5' 6\" (1.676 m), weight 146 lb (66.2 kg), SpO2 99 %, not currently breastfeeding. 7/13/2021    Description of clinical problem: The patient is coming in with complaints of numbness and tingling. Chief Complaint   Patient presents with    Procedure     EMG BUE     Pain No   ; Numbness/tingling  Yes; Weakness  No       Brief physical exam:   Sensory deficit Yes; Weakness No; Atrophy  No; Reflex abnormality Yes and No  Motor NCS      Nerve / Sites Latency Amplitude Amp. 1-2 Distance Lat Diff Velocity Temp.    ms mV % cm ms m/s °C   L Median - APB      Wrist 4.27 8.0 100 8   31.9      Elbow 8.59 7.3 92 19 4.32 44 31.7   R Median - APB      Wrist 3.91 9.0 100 8   32.1      Elbow 7.86 8.3 91.9 19 3.96 48 32.2   L Ulnar - ADM      Wrist 3.23 5.9 100 8   31.9      B. Elbow 6.67 5.9 101 19 3.44 55 31.9      A. Elbow 8.44 5.9 101 10 1.77 56 31.9     Sensory NCS      Nerve / Sites Onset Lat Peak Lat PP Amp Amp. 1-2 Distance Velocity Temp.    ms ms µV % cm m/s °C   L Median - Digit II (Antidromic)      Mid Palm 1.67 2.45 30.9 100 7 42 32      Wrist 3.33 4.17 26.9 68.2 14 42 32.1   R Median - Digit II (Antidromic)      Mid Palm 1.46 2.19 29.0 100 7 48 32.1      Wrist 3.07 3.85 21.5 56.6 14 46 32   L Ulnar - Digit V (Antidromic)      Wrist 3.18 4.06 22.4 100 14 44 31.6   L Radial - Anatomical  (Forearm)      Forearm 2.40 3.13 1.5 100 10 42 32.1       F  Wave      Nerve F Lat M Lat F-M Lat    ms ms ms   L Median - APB 28.4 4.4 24.0   L Ulnar - ADM 29.4 1.5 27.9   R Median - APB 30.2 2.1 28.1       EMG         EMG Summary Table     Spontaneous MUAP Recruitment   Muscle IA Fib PSW Fasc H.F. Amp Dur. PPP Pattern   R. First dorsal interosseous Normal None None None None Normal Normal None Normal   R. Pronator teres Normal None None None None Normal Normal None Normal   R. Biceps brachii Normal None None None None Normal Normal None Normal   R. Triceps brachii Normal None None None None Normal Normal None Normal   R. Deltoid Normal None None None None Normal Normal None Normal   L. First dorsal interosseous Normal None None None None Normal Normal None Normal   L. Pronator teres Normal None None None None Normal Normal None Normal   L. Biceps brachii Normal None None None None Normal Normal None Normal   L. Triceps brachii Normal None None None None Normal Normal None Normal   L. Deltoid Normal None None None None Normal Normal None Normal      Study Limitations:  None    Summary of Findings:   Nerve conduction studies:   · The following nerve conduction studies were abnormal:   · The bilateral median motor nerve showed mildly reduced conduction velocity. · The bilateral median sensory nerve showed mildly reduced conduction velocity. The left median sensory nerve showed prolonged peak latency at the mid palm and wrist.  · All other nerve conduction studies listed in the table above were normal in latency, amplitude and conduction velocity. Needle EMG:   · Needle EMG was performed using a concentric needle.  All the muscles tested, as listed in the table above demonstrated normal amplitude, duration, phases and recruitment and no active denervation signs were seen. Diagnostic Interpretation:      This study was abnormal.     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 There is no evidence of an underlying radiculopathy or a large fiber polyneuropathy. Previous Study: NA      Follow up EMG is recommended if clinically indicated. Technologist: SC    Physician: Irving Telles MD    Nerve conduction studies and electromyography were performed according to our laboratory policies and procedures which can be provided upon request. All abnormal values are identified in the table.  Laboratory normal values can also be provided upon request.       Cc: MD Subha Fraga MD

## 2021-07-14 ENCOUNTER — TELEPHONE (OUTPATIENT)
Dept: NEUROLOGY | Age: 66
End: 2021-07-14

## 2021-07-14 ENCOUNTER — HOSPITAL ENCOUNTER (OUTPATIENT)
Dept: MRI IMAGING | Age: 66
Discharge: HOME | End: 2021-07-16
Payer: MEDICARE

## 2021-07-14 DIAGNOSIS — M54.12 CERVICAL RADICULOPATHY: ICD-10-CM

## 2021-07-14 DIAGNOSIS — M48.02 CERVICAL SPINAL STENOSIS: Primary | ICD-10-CM

## 2021-07-14 DIAGNOSIS — R51.9 WORSENING HEADACHES: ICD-10-CM

## 2021-07-14 DIAGNOSIS — R25.1 TREMORS OF NERVOUS SYSTEM: ICD-10-CM

## 2021-07-14 DIAGNOSIS — C79.51 OSTEOLYTIC LESION DUE TO METASTASIS (HCC): ICD-10-CM

## 2021-07-14 PROCEDURE — 72141 MRI NECK SPINE W/O DYE: CPT

## 2021-07-14 PROCEDURE — 70551 MRI BRAIN STEM W/O DYE: CPT

## 2021-07-14 NOTE — TELEPHONE ENCOUNTER
Spoke with patient and informed her that the cervical spine MRI shows bulging disc and arthritis and narrowing of the spinal canal causing mild pressure on the spinal cord. Elieser Fowler is also narrowing of bilateral foramen which is most severe at C6-C7 levels. There also appears to be an area of concern in the part of the bone in and we will need to do a spine MRI with contrast for further evaluation. Orders placed. Scheduling number given. Referral placed for neurosurgery.

## 2021-07-21 ENCOUNTER — HOSPITAL ENCOUNTER (OUTPATIENT)
Dept: MRI IMAGING | Age: 66
Discharge: HOME | End: 2021-07-23
Payer: MEDICARE

## 2021-07-21 DIAGNOSIS — C79.51 OSTEOLYTIC LESION DUE TO METASTASIS (HCC): ICD-10-CM

## 2021-07-21 PROCEDURE — 6360000004 HC RX CONTRAST MEDICATION: Performed by: RADIOLOGY

## 2021-07-21 PROCEDURE — A9579 GAD-BASE MR CONTRAST NOS,1ML: HCPCS | Performed by: RADIOLOGY

## 2021-07-21 PROCEDURE — 72156 MRI NECK SPINE W/O & W/DYE: CPT

## 2021-07-21 RX ADMIN — GADOTERIDOL 13 ML: 279.3 INJECTION, SOLUTION INTRAVENOUS at 07:15

## 2021-07-22 ENCOUNTER — TELEPHONE (OUTPATIENT)
Dept: NEUROLOGY | Age: 66
End: 2021-07-22

## 2021-07-22 NOTE — TELEPHONE ENCOUNTER
----- Message from Tucker Khanna MD sent at 7/21/2021  4:22 PM EDT -----  Please inform the patient that the cervical spine MRI shows arthritis and bulging disc at multiple levels however this is most notable at C6-C7 level. There is also a small area of blood vessel in the C7 bone, this is most likely something that she is born with and has not changed. We will need to follow-up in the future to ensure stability.

## 2021-07-22 NOTE — TELEPHONE ENCOUNTER
Left message for patient and informed her that the cervical spine MRI shows arthritis and bulging disc at multiple levels however this is most notable at C6-C7 level. There is also a small area of blood vessel in the C7 bone, this is most likely something that she is born with and has not changed.

## 2021-07-30 ENCOUNTER — OFFICE VISIT (OUTPATIENT)
Dept: NEUROLOGY | Age: 66
End: 2021-07-30
Payer: MEDICARE

## 2021-07-30 VITALS
WEIGHT: 146 LBS | HEIGHT: 66 IN | SYSTOLIC BLOOD PRESSURE: 130 MMHG | OXYGEN SATURATION: 96 % | RESPIRATION RATE: 16 BRPM | TEMPERATURE: 97.8 F | DIASTOLIC BLOOD PRESSURE: 77 MMHG | BODY MASS INDEX: 23.46 KG/M2 | HEART RATE: 74 BPM

## 2021-07-30 DIAGNOSIS — R20.0 ARM NUMBNESS: ICD-10-CM

## 2021-07-30 DIAGNOSIS — M54.12 CERVICAL RADICULOPATHY: ICD-10-CM

## 2021-07-30 DIAGNOSIS — R25.1 TREMORS OF NERVOUS SYSTEM: Primary | ICD-10-CM

## 2021-07-30 PROCEDURE — 1090F PRES/ABSN URINE INCON ASSESS: CPT | Performed by: PSYCHIATRY & NEUROLOGY

## 2021-07-30 PROCEDURE — 1123F ACP DISCUSS/DSCN MKR DOCD: CPT | Performed by: PSYCHIATRY & NEUROLOGY

## 2021-07-30 PROCEDURE — G8420 CALC BMI NORM PARAMETERS: HCPCS | Performed by: PSYCHIATRY & NEUROLOGY

## 2021-07-30 PROCEDURE — 1036F TOBACCO NON-USER: CPT | Performed by: PSYCHIATRY & NEUROLOGY

## 2021-07-30 PROCEDURE — 99214 OFFICE O/P EST MOD 30 MIN: CPT | Performed by: PSYCHIATRY & NEUROLOGY

## 2021-07-30 PROCEDURE — 4040F PNEUMOC VAC/ADMIN/RCVD: CPT | Performed by: PSYCHIATRY & NEUROLOGY

## 2021-07-30 PROCEDURE — 3017F COLORECTAL CA SCREEN DOC REV: CPT | Performed by: PSYCHIATRY & NEUROLOGY

## 2021-07-30 PROCEDURE — G8400 PT W/DXA NO RESULTS DOC: HCPCS | Performed by: PSYCHIATRY & NEUROLOGY

## 2021-07-30 PROCEDURE — G8427 DOCREV CUR MEDS BY ELIG CLIN: HCPCS | Performed by: PSYCHIATRY & NEUROLOGY

## 2021-07-30 ASSESSMENT — ENCOUNTER SYMPTOMS
SHORTNESS OF BREATH: 0
TROUBLE SWALLOWING: 0
NAUSEA: 0
PHOTOPHOBIA: 0
VOMITING: 0

## 2021-07-30 NOTE — PROGRESS NOTES
NEUROLOGY NEW PATIENT NOTE     Date: 7/30/2021  Name: Christine Rodriguez  MRN: [id number]  Patient's PCP: Edil Castaneda MD     Dear, Dr. Edil Castaneda MD    REASON FOR VISIT/CHIEF COMPLAINT: Follow up for multiple complaints of tremors, neck pain, left shoulder pain, numbness and tingling in the left hand and headaches. Interval history    The patient is coming in for follow-up visit  Reports that she continues to have pain radiating from her shoulder to the upper arm and in the fingers. She also have associated numbness and tingling. The patient also reports multiple knots in the arm and upper back. Tremors have been stable, she is off of prednisone. MRI brain: No acute abnormality  I have personally reviewed her MRI images. No other aggravating or relieving factors  No other associated symptoms    Disease course:      Christine Rodriguez is a 77 y.o.  female with a past medical history of anxiety, asthma is coming in with longstanding tremors. Tremor     Onset: Many years ago has been closely getting worse since last 3 years.  Location: Bilateral hands right greater than left   Severity: Mild to moderate the patient notices tremors mostly   Context: With action and intention, no tremors at rest.   Aggravating factors: Writing, holding, doing fine motor tasks   Reliving factors: None   Associated signs and symptoms: Left arm numbness and headaches.     At rest: No   On action: Yes   Head tremor: No   Voice tremor: No   Jaw tremor: No   Presence of a discomfort or pulling sensation in the part of the body that is shaking: No   Slowing of movements: No no   Loss of smell or taste: no   Interferes with ADL's: No   Falls: No   Effect of EtOH on tremor: Unknown   Associated with medication use: Yes, prednisone   Family history of shaking or tremor: Father   Treatments tried: None    EMG    Electrodiagnosis:     The electrical finding of the study reveals evidence of demyelinating Topics Concern    None   Social History Narrative    None     Social Determinants of Health     Financial Resource Strain:     Difficulty of Paying Living Expenses:    Food Insecurity:     Worried About Running Out of Food in the Last Year:     [address] in the Last Year:    Transportation Needs:     Lack of Transportation (Medical):      Lack of Transportation (Non-Medical):    Physical Activity:     Days of Exercise per Week:     Minutes of Exercise per Session:    Stress:     Feeling of Stress :    Social Connections:     Frequency of Communication with Friends and Family:     Frequency of Social Gatherings with Friends and Family:     Attends [employer] Services:     Active Member of Clubs or Organizations:     Attends Club or Organization Meetings:     Marital Status:    Intimate Partner Violence:     Fear of Current or Ex-Partner:     Emotionally Abused:     Physically Abused:     Sexually Abused:       E-Cigarettes/Vaping Use     Questions Responses    E-Cigarette/Vaping Use Never User    Start Date     Passive Exposure     Quit Date     Counseling Given     Comments          Allergy:   Allergies   Allergen Reactions    Lipitor [Atorvastatin] Nausea And Vomiting     violent n/v    Bentyl [Dicyclomine Hcl] Other (See Comments)     Dizzy      Chlorhexidine Gluconate Other (See Comments)     \" asthma attack\"    Ciprofloxacin Other (See Comments)     Extreme dizziness    Codeine      States cannot take, \"it's like speed\"     Crestor [Rosuvastatin] Other (See Comments)     Legs ache    Entex [Ami-Hubert] Other (See Comments)     HEADACHES    Florastor [Yeast]     Levaquin [Levofloxacin In D5w]      Arm pain in joints      Levsin [Hyoscyamine Sulfate] Other (See Comments)     dizzy    Librax [Chlordiazepoxide-Clidinium] Other (See Comments)     dizzy    Morphine      Headache      Reglan [Metoclopramide] Other (See Comments)     Very dizzy    Septra [Bactrim] Other (See Comments) Unsure of rxn    Singulair [Montelukast Sodium]     Xyzal [Levocetirizine]      asthma    Zetia [Ezetimibe] Other (See Comments)     \"feels like someone poured acid down my stomach\"    Adhesive Tape Rash     Blisters with prolong contact, > 24 hours; skin peels    Dexamethasone     Doxycycline Calcium Hives and Rash     ANY CYCLINES    Oxytetracycline Hives and Rash    Sumycin [Tetracycline Hcl] Hives and Rash    Tetracyclines & Related Hives and Rash     MEDS:   Current Outpatient Medications:     LORazepam (ATIVAN) 0.5 MG tablet, Take 1 tablet by mouth every 6 hours as needed for Anxiety for up to 30 days. , Disp: 40 tablet, Rfl: 0    Phenazopyridine HCl (AZO URINARY PAIN RELIEF PO), Take by mouth, Disp: , Rfl:     nystatin (MYCOSTATIN) 707975 UNIT/ML suspension, Take 5 mLs by mouth 4 times daily, Disp: 2 Bottle, Rfl: 1    aspirin 81 MG tablet, Take 81 mg by mouth every other day Sun tues thurs sat, Disp: , Rfl:     Multiple Vitamins-Minerals (WOMENS MULTIVITAMIN PO), Take by mouth, Disp: , Rfl:     Lactobacillus (ULTIMATE PROBIOTIC FORMULA) CAPS, Take 1 capsule by mouth daily, Disp: , Rfl:     Ibuprofen-diphenhydrAMINE HCl (ADVIL PM) 200-25 MG CAPS, Take 2 tablets by mouth as needed, Disp: , Rfl:     esomeprazole (NEXIUM) 20 MG CPDR, Take 1 capsule by mouth daily, Disp: , Rfl:     spironolactone (ALDACTONE) 25 MG tablet, Take 25 mg by mouth daily , Disp: , Rfl:     Cetirizine HCl (ZYRTEC ALLERGY PO), Take by mouth (Patient not taking: Reported on 7/6/2021), Disp: , Rfl:     hydrocortisone 2.5 % cream, Apply topically 2 times daily Apply topically 2 times daily. (Patient not taking: Reported on 7/6/2021), Disp: 1 Tube, Rfl: 5    hydrocortisone (ANUSOL-HC) 2.5 % rectal cream, Place rectally 2 times daily.  (Patient not taking: Reported on 7/6/2021), Disp: 1 Tube, Rfl: 5    REVIEW OF SYSTEMS  Review of Systems   Constitutional: Negative for appetite change, fatigue and unexpected weight change. HENT: Negative for drooling, hearing loss, tinnitus and trouble swallowing. Eyes: Negative for photophobia and visual disturbance. Respiratory: Negative for shortness of breath. Cardiovascular: Negative for palpitations. Gastrointestinal: Negative for nausea and vomiting. Endocrine: Negative for polyuria. Genitourinary: Negative for flank pain. Musculoskeletal: Positive for neck pain. Negative for neck stiffness. Skin: Negative for rash. Allergic/Immunologic: Negative for food allergies. Neurological: Positive for tremors, numbness and headaches. Negative for dizziness, seizures, syncope, speech difficulty, weakness and light-headedness. Hematological: Negative for adenopathy. Psychiatric/Behavioral: Negative for agitation, behavioral problems and sleep disturbance. PHYSICAL EXAM:   /77   Pulse 74   Temp 97.8 °F (36.6 °C) (Temporal)   Resp 16   Ht 5' 6\" (1.676 m)   Wt 146 lb (66.2 kg)   SpO2 96%   BMI 23.57 kg/m²      Neurological examination     MENTAL STATUS: Patient awake and oriented to time, place, and person. Recent/remote memory normal. Attention span/concentration normal. Speech fluent. Good comprehension, naming, and repetition. Fund of knowledge appropriate for patient's level of education. Affect normal.    CRANIAL NERVES:  CN I: Not tested. CN II: Fundoscopic exam not performed. CN III, IV, VI: Pupils equal, round and reactive to light; extra ocular movements full and intact. CN V: Facial sensation normal.  CN VII: No facial asymmetry. CN VIII:  Hearing grossly normal bilaterally. No pathologic nystagmus or skew deviation. CN IX, X: Palate elevates symmetrically. CN XI: Shoulder shrug and chin rotation equal with intact strength. CN XII: Tongue protrusion midline. MOTOR: Normal bulk. Tone normal and symmetric throughout. Strength 5/5 throughout.       ABNORMAL MOVEMENTS/TREMORS: Yes  4 to 6 Hz tremor mostly on action and intention, mild tremor on posture, no tremor at rest.    REFLEXES: DTRs 2+; normal and symmetric throughout. Plantar response downgoing. SENSATION: Sensation grossly intact to fine touch, pain/temperature, vibration and position. COORDINATION: Finger-to-nose and heel to shin normal for age and symmetric. Finger tapping and alternating movements normal.    STATION: Negative Romberg. GAIT:  Normal heel, toe and tandem; no ataxia. Multiple trigger points noted in the left trapezius muscle. DIAGNOSTIC TESTS:     I have personally reviewed the most recent lab results:    Sodium   Date Value Ref Range Status   07/09/2021 140 132 - 146 mmol/L Final   05/25/2021 137 132 - 146 mmol/L Final   05/16/2021 140 132 - 146 mmol/L Final     Potassium   Date Value Ref Range Status   05/25/2021 4.2 3.5 - 5.0 mmol/L Final     Potassium reflex Magnesium   Date Value Ref Range Status   07/09/2021 4.3 3.5 - 5.0 mmol/L Final   05/16/2021 5.1 (H) 3.5 - 5.0 mmol/L Final     Comment:     Specimen is moderately Hemolyzed. Result may be artificially increased. 05/05/2020 4.4 3.5 - 5.0 mmol/L Final     Chloride   Date Value Ref Range Status   07/09/2021 105 98 - 107 mmol/L Final   05/25/2021 102 98 - 107 mmol/L Final   05/16/2021 106 98 - 107 mmol/L Final     CO2   Date Value Ref Range Status   07/09/2021 25 22 - 29 mmol/L Final   05/25/2021 26 22 - 29 mmol/L Final   05/16/2021 26 22 - 29 mmol/L Final     BUN   Date Value Ref Range Status   07/09/2021 14 6 - 23 mg/dL Final   05/25/2021 12 6 - 23 mg/dL Final   05/16/2021 14 6 - 23 mg/dL Final     CREATININE   Date Value Ref Range Status   07/09/2021 0.7 0.5 - 1.0 mg/dL Final   05/25/2021 0.8 0.5 - 1.0 mg/dL Final   05/16/2021 0.6 0.5 - 1.0 mg/dL Final     GFR Non-   Date Value Ref Range Status   07/09/2021 >60 >=60 mL/min/1.73 Final     Comment:     Chronic Kidney Disease: less than 60 ml/min/1.73 sq.m. Kidney Failure: less than 15 ml/min/1.73 sq.m.   Results valid for patients 18 years and older. 05/25/2021 >60 >=60 mL/min/1.73 Final     Comment:     Chronic Kidney Disease: less than 60 ml/min/1.73 sq.m. Kidney Failure: less than 15 ml/min/1.73 sq.m. Results valid for patients 18 years and older. 05/16/2021 >60 >=60 mL/min/1.73 Final     Comment:     Chronic Kidney Disease: less than 60 ml/min/1.73 sq.m. Kidney Failure: less than 15 ml/min/1.73 sq.m. Results valid for patients 18 years and older.        Calcium   Date Value Ref Range Status   07/09/2021 9.3 8.6 - 10.2 mg/dL Final   05/25/2021 9.5 8.6 - 10.2 mg/dL Final   05/16/2021 9.4 8.6 - 10.2 mg/dL Final     Magnesium   Date Value Ref Range Status   05/14/2019 1.9 mg/dL Final     WBC   Date Value Ref Range Status   07/09/2021 7.9 4.5 - 11.5 E9/L Final   05/25/2021 5.7 4.5 - 11.5 E9/L Final   05/16/2021 6.3 4.5 - 11.5 E9/L Final     Hemoglobin   Date Value Ref Range Status   07/09/2021 15.3 11.5 - 15.5 g/dL Final   05/25/2021 15.1 11.5 - 15.5 g/dL Final   05/16/2021 14.1 11.5 - 15.5 g/dL Final     Hematocrit   Date Value Ref Range Status   07/09/2021 47.9 34.0 - 48.0 % Final   05/25/2021 44.4 34.0 - 48.0 % Final   05/16/2021 43.4 34.0 - 48.0 % Final     Platelets   Date Value Ref Range Status   07/09/2021 251 130 - 450 E9/L Final   05/25/2021 206 130 - 450 E9/L Final   05/16/2021 231 130 - 450 E9/L Final     Neutrophils %   Date Value Ref Range Status   07/09/2021 63.7 43.0 - 80.0 % Final   05/16/2021 62.3 43.0 - 80.0 % Final   05/05/2020 67.6 43.0 - 80.0 % Final     Monocytes %   Date Value Ref Range Status   07/09/2021 10.8 2.0 - 12.0 % Final   05/16/2021 9.2 2.0 - 12.0 % Final   05/05/2020 7.2 2.0 - 12.0 % Final     Total Protein   Date Value Ref Range Status   07/09/2021 7.2 6.4 - 8.3 g/dL Final   05/25/2021 7.5 6.4 - 8.3 g/dL Final   05/16/2021 6.9 6.4 - 8.3 g/dL Final     Total Bilirubin   Date Value Ref Range Status   07/09/2021 0.2 0.0 - 1.2 mg/dL Final   05/25/2021 0.5 0.0 - 1.2 mg/dL Final 05/16/2021 0.3 0.0 - 1.2 mg/dL Final     Alkaline Phosphatase   Date Value Ref Range Status   07/09/2021 91 35 - 104 U/L Final   05/25/2021 107 (H) 35 - 104 U/L Final   05/16/2021 95 35 - 104 U/L Final     ALT   Date Value Ref Range Status   07/09/2021 23 0 - 32 U/L Final   05/25/2021 18 0 - 32 U/L Final   05/16/2021 21 0 - 32 U/L Final     AST   Date Value Ref Range Status   07/09/2021 21 0 - 31 U/L Final   05/25/2021 20 0 - 31 U/L Final   05/16/2021 31 0 - 31 U/L Final     Comment:     Specimen is moderately Hemolyzed. Result may be artificially increased. Lab Results   Component Value Date    INR 1.0 09/03/2016     Lab Results   Component Value Date    TRIG 97 05/25/2021    HDL 74 05/25/2021     No components found for: HGBA1C  No results found for: PROTEINCSF, GLUCCSF, WBCCSF    Controlled Substance Monitoring:    Acute and Chronic Pain Monitoring:   RX Monitoring 4/24/2020   Attestation -   Periodic Controlled Substance Monitoring No signs of potential drug abuse or diversion identified. MEDICAL DECISION MAKING  ASSESSMENT/PLAN    Delfin Canela was seen today for tremors. Diagnoses and all orders for this visit:    Tremors of nervous system    Left arm numbness    Abnormal MRI, cervical spine    Cervical radiculopathy    Anxiety    ·    MRI brain: No acute normality  · Tremors most likely secondary to underlying essential tremor, currently does not affect activities of daily living. Continue to monitor. No stiffness, rigidity or gait problems, no signs or symptoms of Parkinson's disease. · Left arm numbness most likely secondary to underlying C5-C6, C6-C7 radiculopathy. Continue with physical therapy. If symptoms continue to get worse, consider epidural injections. · Trigger points in the upper back are improving. If symptoms get worse consider trigger point injections. · Check MRI cervical spine in 6 months for following up of C7 vertebral body atypical intraosseous hemangioma.      Follow-up in 6 months    Thank you for involving me in the care of your patient. Today, I personally spent a great amount of time directly face-to-face time with the patient, of which greater than 50% was spent in patient education, counseling,about etiology management and diagnosis of cervical radiculopathy, essential tremor, headache. Side effects of medications including were discussed in detail with the patient, verbalizes understanding and agrees to it. And coordination of care as described above. Patient's current medication list, allergies, problem list and results of all previously ordered testing and scans were reviewed at today's visit.       MD EDWIN Maravilla [facility] Neurology  [address] [place] [address]

## 2021-08-18 ENCOUNTER — TELEPHONE (OUTPATIENT)
Dept: NEUROSURGERY | Age: 66
End: 2021-08-18

## 2021-11-19 ENCOUNTER — HOSPITAL ENCOUNTER (OUTPATIENT)
Age: 66
Discharge: HOME | End: 2021-11-19
Payer: MEDICARE

## 2021-11-19 DIAGNOSIS — E78.2 MIXED HYPERLIPIDEMIA: ICD-10-CM

## 2021-11-19 DIAGNOSIS — Z00.00 ROUTINE GENERAL MEDICAL EXAMINATION AT A HEALTH CARE FACILITY: ICD-10-CM

## 2021-11-19 LAB
ALBUMIN SERPL-MCNC: 4.5 G/DL (ref 3.5–5.2)
ALP BLD-CCNC: 111 U/L (ref 35–104)
ALT SERPL-CCNC: 23 U/L (ref 0–32)
ANION GAP SERPL CALCULATED.3IONS-SCNC: 14 MMOL/L (ref 7–16)
AST SERPL-CCNC: 23 U/L (ref 0–31)
BACTERIA: NORMAL /HPF
BILIRUB SERPL-MCNC: 0.5 MG/DL (ref 0–1.2)
BILIRUBIN URINE: NEGATIVE
BLOOD, URINE: ABNORMAL
BUN BLDV-MCNC: 13 MG/DL (ref 6–23)
CALCIUM SERPL-MCNC: 9.6 MG/DL (ref 8.6–10.2)
CHLORIDE BLD-SCNC: 103 MMOL/L (ref 98–107)
CHOLESTEROL, TOTAL: 215 MG/DL (ref 0–199)
CLARITY: CLEAR
CO2: 22 MMOL/L (ref 22–29)
COLOR: YELLOW
CREAT SERPL-MCNC: 0.7 MG/DL (ref 0.5–1)
EPITHELIAL CELLS, UA: NORMAL /HPF
GFR AFRICAN AMERICAN: >60
GFR NON-AFRICAN AMERICAN: >60 ML/MIN/1.73
GLUCOSE BLD-MCNC: 95 MG/DL (ref 74–99)
GLUCOSE URINE: NEGATIVE MG/DL
HCT VFR BLD CALC: 43.9 % (ref 34–48)
HDLC SERPL-MCNC: 72 MG/DL
HEMOGLOBIN: 14.3 G/DL (ref 11.5–15.5)
KETONES, URINE: NEGATIVE MG/DL
LDL CHOLESTEROL CALCULATED: 123 MG/DL (ref 0–99)
LEUKOCYTE ESTERASE, URINE: ABNORMAL
MCH RBC QN AUTO: 29.7 PG (ref 26–35)
MCHC RBC AUTO-ENTMCNC: 32.6 % (ref 32–34.5)
MCV RBC AUTO: 91.1 FL (ref 80–99.9)
NITRITE, URINE: NEGATIVE
PDW BLD-RTO: 12.3 FL (ref 11.5–15)
PH UA: 6.5 (ref 5–9)
PLATELET # BLD: 220 E9/L (ref 130–450)
PMV BLD AUTO: 9.9 FL (ref 7–12)
POTASSIUM SERPL-SCNC: 4.9 MMOL/L (ref 3.5–5)
PROTEIN UA: NEGATIVE MG/DL
RBC # BLD: 4.82 E12/L (ref 3.5–5.5)
RBC UA: NORMAL /HPF (ref 0–2)
SODIUM BLD-SCNC: 139 MMOL/L (ref 132–146)
SPECIFIC GRAVITY UA: 1.01 (ref 1–1.03)
TOTAL PROTEIN: 7.6 G/DL (ref 6.4–8.3)
TRIGL SERPL-MCNC: 101 MG/DL (ref 0–149)
UROBILINOGEN, URINE: 0.2 E.U./DL
VLDLC SERPL CALC-MCNC: 20 MG/DL
WBC # BLD: 5.3 E9/L (ref 4.5–11.5)
WBC UA: NORMAL /HPF (ref 0–5)

## 2021-11-19 PROCEDURE — 36415 COLL VENOUS BLD VENIPUNCTURE: CPT

## 2021-11-19 PROCEDURE — 80061 LIPID PANEL: CPT

## 2021-11-19 PROCEDURE — 80053 COMPREHEN METABOLIC PANEL: CPT

## 2021-11-19 PROCEDURE — 81001 URINALYSIS AUTO W/SCOPE: CPT

## 2021-11-19 PROCEDURE — 85027 COMPLETE CBC AUTOMATED: CPT

## 2021-12-08 ENCOUNTER — HOSPITAL ENCOUNTER (OUTPATIENT)
Age: 66
Discharge: HOME | End: 2021-12-08
Payer: MEDICARE

## 2021-12-08 DIAGNOSIS — N30.00 ACUTE CYSTITIS WITHOUT HEMATURIA: ICD-10-CM

## 2021-12-08 DIAGNOSIS — Z20.822 EXPOSURE TO COVID-19 VIRUS: ICD-10-CM

## 2021-12-08 PROCEDURE — 87077 CULTURE AEROBIC IDENTIFY: CPT

## 2021-12-08 PROCEDURE — 86769 SARS-COV-2 COVID-19 ANTIBODY: CPT

## 2021-12-08 PROCEDURE — 87186 SC STD MICRODIL/AGAR DIL: CPT

## 2021-12-08 PROCEDURE — 36415 COLL VENOUS BLD VENIPUNCTURE: CPT

## 2021-12-08 PROCEDURE — 87088 URINE BACTERIA CULTURE: CPT

## 2021-12-09 LAB — SARS-COV-2 ANTIBODY, TOTAL: NORMAL

## 2021-12-10 LAB
ORGANISM: ABNORMAL
URINE CULTURE, ROUTINE: ABNORMAL

## 2022-01-04 ENCOUNTER — OFFICE VISIT (OUTPATIENT)
Dept: ENDOCRINOLOGY | Age: 67
End: 2022-01-04
Payer: MEDICARE

## 2022-01-04 VITALS
SYSTOLIC BLOOD PRESSURE: 109 MMHG | WEIGHT: 149 LBS | DIASTOLIC BLOOD PRESSURE: 73 MMHG | BODY MASS INDEX: 23.95 KG/M2 | HEART RATE: 81 BPM | HEIGHT: 66 IN

## 2022-01-04 DIAGNOSIS — E55.9 VITAMIN D DEFICIENCY: ICD-10-CM

## 2022-01-04 DIAGNOSIS — E04.9 GOITER: ICD-10-CM

## 2022-01-04 DIAGNOSIS — E04.2 MULTINODULAR GOITER: Primary | ICD-10-CM

## 2022-01-04 DIAGNOSIS — E04.2 MULTINODULAR GOITER: ICD-10-CM

## 2022-01-04 LAB
T4 FREE: 1.2 NG/DL (ref 0.93–1.7)
TSH SERPL DL<=0.05 MIU/L-ACNC: 1.34 UIU/ML (ref 0.27–4.2)

## 2022-01-04 PROCEDURE — G8428 CUR MEDS NOT DOCUMENT: HCPCS | Performed by: INTERNAL MEDICINE

## 2022-01-04 PROCEDURE — G8400 PT W/DXA NO RESULTS DOC: HCPCS | Performed by: INTERNAL MEDICINE

## 2022-01-04 PROCEDURE — 1090F PRES/ABSN URINE INCON ASSESS: CPT | Performed by: INTERNAL MEDICINE

## 2022-01-04 PROCEDURE — G8484 FLU IMMUNIZE NO ADMIN: HCPCS | Performed by: INTERNAL MEDICINE

## 2022-01-04 PROCEDURE — 1036F TOBACCO NON-USER: CPT | Performed by: INTERNAL MEDICINE

## 2022-01-04 PROCEDURE — 99214 OFFICE O/P EST MOD 30 MIN: CPT | Performed by: INTERNAL MEDICINE

## 2022-01-04 PROCEDURE — 3017F COLORECTAL CA SCREEN DOC REV: CPT | Performed by: INTERNAL MEDICINE

## 2022-01-04 PROCEDURE — 1123F ACP DISCUSS/DSCN MKR DOCD: CPT | Performed by: INTERNAL MEDICINE

## 2022-01-04 PROCEDURE — G8420 CALC BMI NORM PARAMETERS: HCPCS | Performed by: INTERNAL MEDICINE

## 2022-01-04 PROCEDURE — 4040F PNEUMOC VAC/ADMIN/RCVD: CPT | Performed by: INTERNAL MEDICINE

## 2022-01-04 NOTE — PROGRESS NOTES
TUBAL LIGATION         SOCIAL HISTORY   Tobacco:   reports that she quit smoking about 5 years ago. She has a 10.00 pack-year smoking history. She has never used smokeless tobacco.  Alcohol:   reports current alcohol use. Drugs:   reports no history of drug use. FAMILY HISTORY   Family History   Problem Relation Age of Onset    Cirrhosis Mother     Alzheimer's Disease Father        ALLERGIES AND DRUG REACTIONS   Allergies   Allergen Reactions    Lipitor [Atorvastatin] Nausea And Vomiting     violent n/v    Bentyl [Dicyclomine Hcl] Other (See Comments)     Dizzy      Chlorhexidine Gluconate Other (See Comments)     \" asthma attack\"    Ciprofloxacin Other (See Comments)     Extreme dizziness    Codeine      States cannot take, \"it's like speed\"     Crestor [Rosuvastatin] Other (See Comments)     Legs ache    Entex [Ami-Hubert] Other (See Comments)     HEADACHES    Florastor [Yeast]     Levaquin [Levofloxacin In D5w]      Arm pain in joints      Levsin [Hyoscyamine Sulfate] Other (See Comments)     dizzy    Librax [Chlordiazepoxide-Clidinium] Other (See Comments)     dizzy    Morphine      Headache      Reglan [Metoclopramide] Other (See Comments)     Very dizzy    Septra [Bactrim] Other (See Comments)     Unsure of rxn    Singulair [Montelukast Sodium]     Xyzal [Levocetirizine]      asthma    Zetia [Ezetimibe] Other (See Comments)     \"feels like someone poured acid down my stomach\"    Adhesive Tape Rash     Blisters with prolong contact, > 24 hours; skin peels    Dexamethasone     Doxycycline Calcium Hives and Rash     ANY CYCLINES    Oxytetracycline Hives and Rash    Sumycin [Tetracycline Hcl] Hives and Rash    Tetracyclines & Related Hives and Rash       CURRENT MEDICATIONS   Current Outpatient Medications   Medication Sig Dispense Refill    LORazepam (ATIVAN) 0.5 MG tablet Take 1 tablet by mouth every 6 hours as needed for Anxiety for up to 30 days.  40 tablet 0    Probiotic Product (VSL#3 DS PO) Take by mouth      Phenazopyridine HCl (AZO URINARY PAIN RELIEF PO) Take by mouth       nystatin (MYCOSTATIN) 913626 UNIT/ML suspension Take 5 mLs by mouth 4 times daily 2 Bottle 1    hydrocortisone 2.5 % cream Apply topically 2 times daily Apply topically 2 times daily. (Patient not taking: Reported on 7/6/2021) 1 Tube 5    aspirin 81 MG tablet Take 81 mg by mouth every other day Sun tues thurs sat      Multiple Vitamins-Minerals (WOMENS MULTIVITAMIN PO) Take by mouth      Lactobacillus (ULTIMATE PROBIOTIC FORMULA) CAPS Take 1 capsule by mouth daily      Ibuprofen-diphenhydrAMINE HCl (ADVIL PM) 200-25 MG CAPS Take 2 tablets by mouth as needed      esomeprazole (NEXIUM) 20 MG CPDR Take 1 capsule by mouth daily      spironolactone (ALDACTONE) 25 MG tablet Take 25 mg by mouth daily        No current facility-administered medications for this visit. Review of Systems  Constitutional: No fever, no chills, no diaphoresis, no generalized weakness. HEENT: No blurred vision, No sore throat, no ear pain, no hair loss  Neck: denied any neck swelling, difficulty swallowing,   Cadrdiopulomary: No CP, SOB or palpitation, No orthopnea or PND. No cough or wheezing. GI: No N/V/D, no constipation, No abdominal pain, no melena or hematochezia   : Denied any dysuria, hematuria, flank pain, discharge, or incontinence. Skin: denied any rash, ulcer, Hirsute, or hyperpigmentation. MSK: denied any joint deformity, joint pain/swelling, muscle pain, or back pain.   Neuro: no numbess, no tingling, no weakness,     OBJECTIVE    /73   Pulse 81   Ht 5' 6\" (1.676 m)   Wt 149 lb (67.6 kg)   BMI 24.05 kg/m²   BP Readings from Last 4 Encounters:   01/04/22 109/73   12/03/21 128/70   08/02/21 110/60   07/30/21 130/77     Wt Readings from Last 6 Encounters:   01/04/22 149 lb (67.6 kg)   12/03/21 147 lb (66.7 kg)   08/02/21 147 lb (66.7 kg)   07/30/21 146 lb (66.2 kg)   07/13/21 146 lb (66.2 kg)   07/09/21 146 lb (66.2 kg)       Physical examination:  General: awake alert, oriented x3, no abnormal position or movements. HEENT: normocephalic non traumatic  Neck: supple, no LN enlargement, mild thyromegaly, I couldn't palpate any thyroid nodules, no thyroid tenderness, no JVD. Pulm: Clear equal air entry no added sounds, no wheezing or rhonchi    CVS: S1 + S2, no murmur, no heave. Dorsalis pedis pulse palpable   Abd: soft lax, no tenderness, no organomegaly, audible bowel sounds. Skin: warm, no lesions, no rash.  No Palmar erythema  Musculoskeletal: No back tenderness, no kyphosis/scoliosis     Neuro: CN intact, sensation normal , muscle power normal. No tremors   Psych: normal mood, and affect    Review of Laboratory Data:  I personally reviewed the following labs:   Lab Results   Component Value Date/Time    WBC 5.3 11/19/2021 10:09 AM    RBC 4.82 11/19/2021 10:09 AM    HGB 14.3 11/19/2021 10:09 AM    HCT 43.9 11/19/2021 10:09 AM    MCV 91.1 11/19/2021 10:09 AM    MCH 29.7 11/19/2021 10:09 AM    MCHC 32.6 11/19/2021 10:09 AM    RDW 12.3 11/19/2021 10:09 AM     11/19/2021 10:09 AM    MPV 9.9 11/19/2021 10:09 AM      Lab Results   Component Value Date/Time     11/19/2021 10:09 AM    K 4.9 11/19/2021 10:09 AM    K 4.3 07/09/2021 05:07 PM    CO2 22 11/19/2021 10:09 AM    BUN 13 11/19/2021 10:09 AM    CREATININE 0.7 11/19/2021 10:09 AM    CALCIUM 9.6 11/19/2021 10:09 AM    LABGLOM >60 11/19/2021 10:09 AM    GFRAA >60 11/19/2021 10:09 AM      Lab Results   Component Value Date/Time    TSH 1.600 05/25/2021 08:45 AM    T4FREE 1.47 05/25/2021 08:45 AM    FT3 2.7 07/07/2020 12:00 AM    TSI <0.10 05/25/2021 08:45 AM    TPOABS 2.2 05/25/2021 08:45 AM    THGAB <0.9 05/25/2021 08:45 AM     Lab Results   Component Value Date    GLUCOSE 95 11/19/2021     Lab Results   Component Value Date    TRIG 101 11/19/2021    HDL 72 11/19/2021    LDLCALC 123 11/19/2021    CHOL 215 11/19/2021     Lab Results   Component Value Date KOTQ33 45 05/25/2021    VITD25 35.6 11/18/2020       ASSESSMENT & RECOMMENDATIONS   Drucella Moritz, a 74 y.o.-old female seen in for the following issues     Multinodular goiter   · Thyroid nodules are very small measuring 4 and 5 mm   · Prevalence of thyroid nodule on thyroid ultrasound is 50% and 95 % of these nodules are benign. · I will continue following with periodic US  · Will obtain US and labs in few weeks    vitD deficiency   · Continue vitD supplement  · Check level     I personally reviewed external notes from PCP and other patient's care team providers, and personally interpreted labs associated with the above diagnosis. I also ordered labs to further assess and manage the above addressed medical conditions. Return in about 1 year (around 1/4/2023) for Multinodular goiter, VitD deficiency. The above issues were reviewed with the patient who understood and agreed with the plan. Thank you for allowing us to participate in the care of this patient. Please do not hesitate to contact us with any additional questions. Diagnosis Orders   1. Multinodular goiter  TSH without Reflex    T4, Free    US THYROID   2. Goiter  TSH without Reflex    T4, Free   3. Vitamin D deficiency       Ramila Yu MD  Endocrinologist, [facility])   [address] [place] [address], [address] [place] [address],[id number]   Phone: [phone]  Fax: [phone]  ------------------------------  An electronic signature was used to authenticate this note.  Jarad Chung MD on 1/4/2022 at 11:27 AM

## 2022-01-06 ENCOUNTER — TELEPHONE (OUTPATIENT)
Dept: ENDOCRINOLOGY | Age: 67
End: 2022-01-06

## 2022-01-20 ENCOUNTER — HOSPITAL ENCOUNTER (OUTPATIENT)
Dept: ULTRASOUND IMAGING | Age: 67
Discharge: HOME | End: 2022-01-22
Payer: MEDICARE

## 2022-01-20 PROCEDURE — 76536 US EXAM OF HEAD AND NECK: CPT

## 2022-01-23 ENCOUNTER — TELEPHONE (OUTPATIENT)
Dept: ENDOCRINOLOGY | Age: 67
End: 2022-01-23

## 2022-01-23 NOTE — TELEPHONE ENCOUNTER
Notify pt,  I have reviewed your recent results    Great!  Thyroid US showed that your thyroid nodule are very small and benign looking

## 2022-02-21 ENCOUNTER — APPOINTMENT (OUTPATIENT)
Dept: CT IMAGING | Age: 67
End: 2022-02-21
Payer: MEDICARE

## 2022-02-21 ENCOUNTER — HOSPITAL ENCOUNTER (EMERGENCY)
Age: 67
Discharge: HOME | End: 2022-02-21
Attending: STUDENT IN AN ORGANIZED HEALTH CARE EDUCATION/TRAINING PROGRAM
Payer: MEDICARE

## 2022-02-21 ENCOUNTER — APPOINTMENT (OUTPATIENT)
Dept: GENERAL RADIOLOGY | Age: 67
End: 2022-02-21
Payer: MEDICARE

## 2022-02-21 VITALS
SYSTOLIC BLOOD PRESSURE: 138 MMHG | TEMPERATURE: 97.6 F | OXYGEN SATURATION: 96 % | RESPIRATION RATE: 16 BRPM | WEIGHT: 145 LBS | HEIGHT: 66 IN | HEART RATE: 70 BPM | BODY MASS INDEX: 23.3 KG/M2 | DIASTOLIC BLOOD PRESSURE: 70 MMHG

## 2022-02-21 DIAGNOSIS — K57.90 DIVERTICULOSIS: Primary | ICD-10-CM

## 2022-02-21 LAB
ALBUMIN SERPL-MCNC: 4.2 G/DL (ref 3.5–5.2)
ALP BLD-CCNC: 107 U/L (ref 35–104)
ALT SERPL-CCNC: 20 U/L (ref 0–32)
ANION GAP SERPL CALCULATED.3IONS-SCNC: 11 MMOL/L (ref 7–16)
AST SERPL-CCNC: 21 U/L (ref 0–31)
BACTERIA: ABNORMAL /HPF
BASOPHILS ABSOLUTE: 0.05 E9/L (ref 0–0.2)
BASOPHILS RELATIVE PERCENT: 0.7 % (ref 0–2)
BILIRUB SERPL-MCNC: 0.2 MG/DL (ref 0–1.2)
BILIRUBIN URINE: NEGATIVE
BLOOD, URINE: NEGATIVE
BUN BLDV-MCNC: 14 MG/DL (ref 6–23)
CALCIUM SERPL-MCNC: 9.1 MG/DL (ref 8.6–10.2)
CHLORIDE BLD-SCNC: 104 MMOL/L (ref 98–107)
CLARITY: ABNORMAL
CO2: 23 MMOL/L (ref 22–29)
COLOR: YELLOW
CREAT SERPL-MCNC: 0.6 MG/DL (ref 0.5–1)
EOSINOPHILS ABSOLUTE: 0.13 E9/L (ref 0.05–0.5)
EOSINOPHILS RELATIVE PERCENT: 1.8 % (ref 0–6)
EPITHELIAL CELLS, UA: ABNORMAL /HPF
GFR AFRICAN AMERICAN: >60
GFR NON-AFRICAN AMERICAN: >60 ML/MIN/1.73
GLUCOSE BLD-MCNC: 90 MG/DL (ref 74–99)
GLUCOSE URINE: NEGATIVE MG/DL
HCT VFR BLD CALC: 43.2 % (ref 34–48)
HEMOGLOBIN: 14.1 G/DL (ref 11.5–15.5)
IMMATURE GRANULOCYTES #: 0.01 E9/L
IMMATURE GRANULOCYTES %: 0.1 % (ref 0–5)
KETONES, URINE: NEGATIVE MG/DL
LACTIC ACID, SEPSIS: 1.1 MMOL/L (ref 0.5–1.9)
LEUKOCYTE ESTERASE, URINE: ABNORMAL
LIPASE: 22 U/L (ref 13–60)
LYMPHOCYTES ABSOLUTE: 1.57 E9/L (ref 1.5–4)
LYMPHOCYTES RELATIVE PERCENT: 21.4 % (ref 20–42)
MCH RBC QN AUTO: 29.6 PG (ref 26–35)
MCHC RBC AUTO-ENTMCNC: 32.6 % (ref 32–34.5)
MCV RBC AUTO: 90.6 FL (ref 80–99.9)
MONOCYTES ABSOLUTE: 0.64 E9/L (ref 0.1–0.95)
MONOCYTES RELATIVE PERCENT: 8.7 % (ref 2–12)
NEUTROPHILS ABSOLUTE: 4.92 E9/L (ref 1.8–7.3)
NEUTROPHILS RELATIVE PERCENT: 67.3 % (ref 43–80)
NITRITE, URINE: NEGATIVE
PDW BLD-RTO: 12.4 FL (ref 11.5–15)
PH UA: 6 (ref 5–9)
PLATELET # BLD: 228 E9/L (ref 130–450)
PMV BLD AUTO: 9.8 FL (ref 7–12)
POTASSIUM REFLEX MAGNESIUM: 4.3 MMOL/L (ref 3.5–5)
PROTEIN UA: NEGATIVE MG/DL
RBC # BLD: 4.77 E12/L (ref 3.5–5.5)
RBC UA: ABNORMAL /HPF (ref 0–2)
SODIUM BLD-SCNC: 138 MMOL/L (ref 132–146)
SPECIFIC GRAVITY UA: 1.01 (ref 1–1.03)
TOTAL PROTEIN: 7.1 G/DL (ref 6.4–8.3)
TROPONIN, HIGH SENSITIVITY: 7 NG/L (ref 0–9)
UROBILINOGEN, URINE: 0.2 E.U./DL
WBC # BLD: 7.3 E9/L (ref 4.5–11.5)
WBC UA: ABNORMAL /HPF (ref 0–5)

## 2022-02-21 PROCEDURE — 80053 COMPREHEN METABOLIC PANEL: CPT

## 2022-02-21 PROCEDURE — 6360000004 HC RX CONTRAST MEDICATION: Performed by: RADIOLOGY

## 2022-02-21 PROCEDURE — 74177 CT ABD & PELVIS W/CONTRAST: CPT

## 2022-02-21 PROCEDURE — 85025 COMPLETE CBC W/AUTO DIFF WBC: CPT

## 2022-02-21 PROCEDURE — 71045 X-RAY EXAM CHEST 1 VIEW: CPT

## 2022-02-21 PROCEDURE — 2580000003 HC RX 258: Performed by: STUDENT IN AN ORGANIZED HEALTH CARE EDUCATION/TRAINING PROGRAM

## 2022-02-21 PROCEDURE — 83690 ASSAY OF LIPASE: CPT

## 2022-02-21 PROCEDURE — 93005 ELECTROCARDIOGRAM TRACING: CPT | Performed by: STUDENT IN AN ORGANIZED HEALTH CARE EDUCATION/TRAINING PROGRAM

## 2022-02-21 PROCEDURE — 99285 EMERGENCY DEPT VISIT HI MDM: CPT

## 2022-02-21 PROCEDURE — 6370000000 HC RX 637 (ALT 250 FOR IP): Performed by: STUDENT IN AN ORGANIZED HEALTH CARE EDUCATION/TRAINING PROGRAM

## 2022-02-21 PROCEDURE — 81001 URINALYSIS AUTO W/SCOPE: CPT

## 2022-02-21 PROCEDURE — 84484 ASSAY OF TROPONIN QUANT: CPT

## 2022-02-21 PROCEDURE — 83605 ASSAY OF LACTIC ACID: CPT

## 2022-02-21 RX ORDER — 0.9 % SODIUM CHLORIDE 0.9 %
1000 INTRAVENOUS SOLUTION INTRAVENOUS ONCE
Status: COMPLETED | OUTPATIENT
Start: 2022-02-21 | End: 2022-02-21

## 2022-02-21 RX ORDER — IBUPROFEN 800 MG/1
800 TABLET ORAL ONCE
Status: COMPLETED | OUTPATIENT
Start: 2022-02-21 | End: 2022-02-21

## 2022-02-21 RX ADMIN — IBUPROFEN 800 MG: 800 TABLET, FILM COATED ORAL at 12:48

## 2022-02-21 RX ADMIN — IOPAMIDOL 75 ML: 755 INJECTION, SOLUTION INTRAVENOUS at 13:43

## 2022-02-21 RX ADMIN — SODIUM CHLORIDE 1000 ML: 9 INJECTION, SOLUTION INTRAVENOUS at 12:49

## 2022-02-21 ASSESSMENT — ENCOUNTER SYMPTOMS
DIARRHEA: 0
RHINORRHEA: 0
EYE PAIN: 0
WHEEZING: 0
TROUBLE SWALLOWING: 0
ABDOMINAL PAIN: 1
SHORTNESS OF BREATH: 0
COUGH: 0
VOMITING: 0
BACK PAIN: 0
SINUS PAIN: 0
SORE THROAT: 0
NAUSEA: 0

## 2022-02-21 ASSESSMENT — PAIN SCALES - GENERAL
PAINLEVEL_OUTOF10: 6
PAINLEVEL_OUTOF10: 6

## 2022-02-21 ASSESSMENT — PAIN - FUNCTIONAL ASSESSMENT: PAIN_FUNCTIONAL_ASSESSMENT: 0-10

## 2022-02-21 ASSESSMENT — PAIN DESCRIPTION - PAIN TYPE: TYPE: ACUTE PAIN

## 2022-02-21 NOTE — ED PROVIDER NOTES
69-year-old female with past medical history of diverticulitis presents today to the emergency department with complaints of left lower quadrant abdominal pain onset 2 weeks ago. Patient states that her symptoms are moderate in severity, constant in nature and have been progressively getting worse over the past 2 weeks. She reports a dull ache in her left lower quadrant that started radiating to her left flank about 2 days ago. Symptoms are associated with difficulty initiating a urine stream as well as dysuria. She denies any melena or hematochezia. She denies any fevers, chills, nausea or vomiting. Patient follows regularly with GI for management of diverticulitis. She has an appointment on March 14 for colonoscopy. Nothing seems to have made her symptoms better or worse over the past 2 weeks. She does take Advil at home with minimal relief of symptoms. Review of Systems   Constitutional: Negative for diaphoresis and fever. HENT: Negative for ear pain, hearing loss, rhinorrhea, sinus pain, sore throat and trouble swallowing. Eyes: Negative for pain. Respiratory: Negative for cough, shortness of breath and wheezing. Cardiovascular: Negative for chest pain and palpitations. Gastrointestinal: Positive for abdominal pain. Negative for diarrhea, nausea and vomiting. Endocrine: Negative for polyuria. Genitourinary: Positive for difficulty urinating, dysuria and flank pain. Negative for frequency, hematuria and urgency. Musculoskeletal: Negative for back pain, neck pain and neck stiffness. Neurological: Negative for dizziness, speech difficulty, weakness, light-headedness and numbness. Psychiatric/Behavioral: Negative for confusion. The patient is not nervous/anxious. Physical Exam  Constitutional:       General: She is not in acute distress. Appearance: Normal appearance. She is not ill-appearing, toxic-appearing or diaphoretic.    HENT:      Head: Normocephalic and atraumatic. Nose: No rhinorrhea. Eyes:      General: No scleral icterus. Extraocular Movements: Extraocular movements intact. Pupils: Pupils are equal, round, and reactive to light. Cardiovascular:      Heart sounds: Normal heart sounds. No murmur heard. No friction rub. No gallop. Pulmonary:      Breath sounds: Normal breath sounds. No wheezing, rhonchi or rales. Abdominal:      Palpations: Abdomen is soft. Tenderness: There is abdominal tenderness in the suprapubic area and left lower quadrant. Musculoskeletal:         General: No swelling. Cervical back: Normal range of motion. No rigidity or tenderness. Right lower leg: No edema. Left lower leg: No edema. Lymphadenopathy:      Cervical: No cervical adenopathy. Skin:     General: Skin is warm and dry. Coloration: Skin is not jaundiced. Neurological:      General: No focal deficit present. Mental Status: She is alert and oriented to person, place, and time. Cranial Nerves: No cranial nerve deficit. Sensory: No sensory deficit. Motor: No weakness. Psychiatric:         Mood and Affect: Mood normal.         Behavior: Behavior normal.         Thought Content: Thought content normal.         Judgment: Judgment normal.          Procedures     MDM  Number of Diagnoses or Management Options  Diverticulosis  Diagnosis management comments: This is a 67-year-old female with past medical history of diverticulosis and diverticulitis who presents today to the emergency department with complaints of left lower quadrant abdominal pain associated with dysuria and flank pain onset 2 weeks ago. On arrival to the ED patient is nontoxic, no acute distress. Vital signs reviewed and within normal limits. On exam heart is without murmurs. Lungs are clear to auscultation. Left lower quadrant abdominal tenderness elicited on exam.  Patient states that she feels pressure upon suprapubic palpation.   Otherwise normal physical exam.  Appropriate labs and imaging ordered. Suspicion for diverticulitis flareup versus nephrolithiasis versus pyelonephritis or acute cystitis at this time. Labs reviewed and showed an unremarkable CBC, unremarkable CMP. UA shows no concern for acute cystitis. Urine culture was sent given the patient's presenting symptoms of dysuria. CT of the abdomen showed diverticulosis with no evidence of diverticulitis. Explained to the patient that she is not requiring antibiotics at this time. She was encouraged to follow-up with her gastroenterologist.  She was also informed that a urine culture has been sent and she will receive a phone call if she is needing antibiotics. Patient verbalized understanding to current treatment plan. She is stable for discharge today from the ED. Return precautions were given.                 --------------------------------------------- PAST HISTORY ---------------------------------------------  Past Medical History:  has a past medical history of Acid reflux, Allergic rhinitis, Asthma, Diverticulosis, Irritable bowel, Pinched nerve in neck, Staph aureus infection, and UTI (urinary tract infection). Past Surgical History:  has a past surgical history that includes hernia repair; Dental surgery; Tubal ligation; cyst removal; Tonsillectomy; Breast surgery; Nasal fracture surgery (1975); Nose surgery (1976); Cholecystectomy (07/14/2015); and Mandible surgery. Social History:  reports that she quit smoking about 5 years ago. She has a 10.00 pack-year smoking history. She has never used smokeless tobacco. She reports current alcohol use. She reports that she does not use drugs. Family History: family history includes Alzheimer's Disease in her father; Cirrhosis in her mother. The patients home medications have been reviewed.     Allergies: Lipitor [atorvastatin], Bentyl [dicyclomine hcl], Chlorhexidine gluconate, Ciprofloxacin, Codeine, Crestor [rosuvastatin], Entex [ami-eladio], Florastor [yeast], Levaquin [levofloxacin in d5w], Levsin [hyoscyamine sulfate], Librax [chlordiazepoxide-clidinium], Morphine, Reglan [metoclopramide], Septra [bactrim], Singulair [montelukast sodium], Xyzal [levocetirizine], Zetia [ezetimibe], Adhesive tape, Dexamethasone, Doxycycline calcium, Oxytetracycline, Sumycin [tetracycline hcl], and Tetracyclines & related    -------------------------------------------------- RESULTS -------------------------------------------------  Labs:  Results for orders placed or performed during the hospital encounter of 02/21/22   CBC with Auto Differential   Result Value Ref Range    WBC 7.3 4.5 - 11.5 E9/L    RBC 4.77 3.50 - 5.50 E12/L    Hemoglobin 14.1 11.5 - 15.5 g/dL    Hematocrit 43.2 34.0 - 48.0 %    MCV 90.6 80.0 - 99.9 fL    MCH 29.6 26.0 - 35.0 pg    MCHC 32.6 32.0 - 34.5 %    RDW 12.4 11.5 - 15.0 fL    Platelets 664 175 - 158 E9/L    MPV 9.8 7.0 - 12.0 fL    Neutrophils % 67.3 43.0 - 80.0 %    Immature Granulocytes % 0.1 0.0 - 5.0 %    Lymphocytes % 21.4 20.0 - 42.0 %    Monocytes % 8.7 2.0 - 12.0 %    Eosinophils % 1.8 0.0 - 6.0 %    Basophils % 0.7 0.0 - 2.0 %    Neutrophils Absolute 4.92 1.80 - 7.30 E9/L    Immature Granulocytes # 0.01 E9/L    Lymphocytes Absolute 1.57 1.50 - 4.00 E9/L    Monocytes Absolute 0.64 0.10 - 0.95 E9/L    Eosinophils Absolute 0.13 0.05 - 0.50 E9/L    Basophils Absolute 0.05 0.00 - 0.20 E9/L   Comprehensive Metabolic Panel w/ Reflex to MG   Result Value Ref Range    Sodium 138 132 - 146 mmol/L    Potassium reflex Magnesium 4.3 3.5 - 5.0 mmol/L    Chloride 104 98 - 107 mmol/L    CO2 23 22 - 29 mmol/L    Anion Gap 11 7 - 16 mmol/L    Glucose 90 74 - 99 mg/dL    BUN 14 6 - 23 mg/dL    CREATININE 0.6 0.5 - 1.0 mg/dL    GFR Non-African American >60 >=60 mL/min/1.73    GFR African American >60     Calcium 9.1 8.6 - 10.2 mg/dL    Total Protein 7.1 6.4 - 8.3 g/dL    Albumin 4.2 3.5 - 5.2 g/dL    Total Bilirubin 0.2 0.0 - 1.2 mg/dL    Alkaline Phosphatase 107 (H) 35 - 104 U/L    ALT 20 0 - 32 U/L    AST 21 0 - 31 U/L   Troponin   Result Value Ref Range    Troponin, High Sensitivity 7 0 - 9 ng/L   Lipase   Result Value Ref Range    Lipase 22 13 - 60 U/L   Lactate, Sepsis   Result Value Ref Range    Lactic Acid, Sepsis 1.1 0.5 - 1.9 mmol/L   Urinalysis with Microscopic   Result Value Ref Range    Color, UA Yellow Straw/Yellow    Clarity, UA SL CLOUDY Clear    Glucose, Ur Negative Negative mg/dL    Bilirubin Urine Negative Negative    Ketones, Urine Negative Negative mg/dL    Specific Gravity, UA 1.010 1.005 - 1.030    Blood, Urine Negative Negative    pH, UA 6.0 5.0 - 9.0    Protein, UA Negative Negative mg/dL    Urobilinogen, Urine 0.2 <2.0 E.U./dL    Nitrite, Urine Negative Negative    Leukocyte Esterase, Urine MODERATE (A) Negative    WBC, UA 2-5 0 - 5 /HPF    RBC, UA 0-1 0 - 2 /HPF    Epithelial Cells, UA FEW /HPF    Bacteria, UA FEW (A) None Seen /HPF   EKG 12 Lead   Result Value Ref Range    Ventricular Rate 72 BPM    Atrial Rate 72 BPM    P-R Interval 158 ms    QRS Duration 74 ms    Q-T Interval 382 ms    QTc Calculation (Bazett) 418 ms    P Axis 29 degrees    R Axis 55 degrees    T Axis 45 degrees       Radiology:  CT ABDOMEN PELVIS W IV CONTRAST Additional Contrast? None   Final Result   1. No bowel obstruction, free air, or free fluid. 2. Redemonstration of numerous diverticula throughout the colon. XR CHEST PORTABLE   Final Result   No acute process. ------------------------- NURSING NOTES AND VITALS REVIEWED ---------------------------  Date / Time Roomed:  2/21/2022 10:45 AM  ED Bed Assignment:  02/02    The nursing notes within the ED encounter and vital signs as below have been reviewed.    /70   Pulse 70   Temp 97.6 °F (36.4 °C) (Oral)   Resp 16   Ht 5' 6\" (1.676 m)   Wt 145 lb (65.8 kg)   SpO2 96%   BMI 23.40 kg/m²   Oxygen Saturation Interpretation: Normal      ------------------------------------------ PROGRESS NOTES ------------------------------------------  2:50 PM EST  I have spoken with the patient and discussed todays results, in addition to providing specific details for the plan of care and counseling regarding the diagnosis and prognosis. Their questions are answered at this time and they are agreeable with the plan. I discussed at length with them reasons for immediate return here for re evaluation. They will followup with their gastroenterologist and primary care physician by calling their office tomorrow. --------------------------------- ADDITIONAL PROVIDER NOTES ---------------------------------  At this time the patient is without objective evidence of an acute process requiring hospitalization or inpatient management. They have remained hemodynamically stable throughout their entire ED visit and are stable for discharge with outpatient follow-up. The plan has been discussed in detail and they are aware of the specific conditions for emergent return, as well as the importance of follow-up. Discharge Medication List as of 2/21/2022  3:44 PM          Diagnosis:  1. Diverticulosis        Disposition:  Patient's disposition: Discharge to home  Patient's condition is stable.        Chalo Curry DO  Resident  02/22/22 [id number]

## 2022-02-22 LAB
EKG ATRIAL RATE: 72 BPM
EKG P AXIS: 29 DEGREES
EKG P-R INTERVAL: 158 MS
EKG Q-T INTERVAL: 382 MS
EKG QRS DURATION: 74 MS
EKG QTC CALCULATION (BAZETT): 418 MS
EKG R AXIS: 55 DEGREES
EKG T AXIS: 45 DEGREES
EKG VENTRICULAR RATE: 72 BPM

## 2022-02-22 PROCEDURE — 93010 ELECTROCARDIOGRAM REPORT: CPT | Performed by: INTERNAL MEDICINE

## 2022-04-02 ENCOUNTER — APPOINTMENT (OUTPATIENT)
Dept: CT IMAGING | Age: 67
End: 2022-04-02
Payer: MEDICARE

## 2022-04-02 ENCOUNTER — HOSPITAL ENCOUNTER (EMERGENCY)
Age: 67
Discharge: HOME | End: 2022-04-03
Attending: STUDENT IN AN ORGANIZED HEALTH CARE EDUCATION/TRAINING PROGRAM
Payer: MEDICARE

## 2022-04-02 DIAGNOSIS — R06.02 SHORTNESS OF BREATH: Primary | ICD-10-CM

## 2022-04-02 LAB
ALBUMIN SERPL-MCNC: 4.4 G/DL (ref 3.5–5.2)
ALP BLD-CCNC: 110 U/L (ref 35–104)
ALT SERPL-CCNC: 20 U/L (ref 0–32)
ANION GAP SERPL CALCULATED.3IONS-SCNC: 12 MMOL/L (ref 7–16)
AST SERPL-CCNC: 19 U/L (ref 0–31)
BASOPHILS ABSOLUTE: 0.07 E9/L (ref 0–0.2)
BASOPHILS RELATIVE PERCENT: 0.8 % (ref 0–2)
BILIRUB SERPL-MCNC: <0.2 MG/DL (ref 0–1.2)
BUN BLDV-MCNC: 19 MG/DL (ref 6–23)
CALCIUM SERPL-MCNC: 9.8 MG/DL (ref 8.6–10.2)
CHLORIDE BLD-SCNC: 104 MMOL/L (ref 98–107)
CO2: 25 MMOL/L (ref 22–29)
CREAT SERPL-MCNC: 0.7 MG/DL (ref 0.5–1)
EOSINOPHILS ABSOLUTE: 0.17 E9/L (ref 0.05–0.5)
EOSINOPHILS RELATIVE PERCENT: 2 % (ref 0–6)
GFR AFRICAN AMERICAN: >60
GFR NON-AFRICAN AMERICAN: >60 ML/MIN/1.73
GLUCOSE BLD-MCNC: 108 MG/DL (ref 74–99)
HCT VFR BLD CALC: 41.3 % (ref 34–48)
HEMOGLOBIN: 13.8 G/DL (ref 11.5–15.5)
IMMATURE GRANULOCYTES #: 0.02 E9/L
IMMATURE GRANULOCYTES %: 0.2 % (ref 0–5)
LYMPHOCYTES ABSOLUTE: 1.69 E9/L (ref 1.5–4)
LYMPHOCYTES RELATIVE PERCENT: 19.4 % (ref 20–42)
MCH RBC QN AUTO: 30.3 PG (ref 26–35)
MCHC RBC AUTO-ENTMCNC: 33.4 % (ref 32–34.5)
MCV RBC AUTO: 90.6 FL (ref 80–99.9)
MONOCYTES ABSOLUTE: 0.93 E9/L (ref 0.1–0.95)
MONOCYTES RELATIVE PERCENT: 10.7 % (ref 2–12)
NEUTROPHILS ABSOLUTE: 5.83 E9/L (ref 1.8–7.3)
NEUTROPHILS RELATIVE PERCENT: 66.9 % (ref 43–80)
PDW BLD-RTO: 12.6 FL (ref 11.5–15)
PLATELET # BLD: 231 E9/L (ref 130–450)
PMV BLD AUTO: 9.7 FL (ref 7–12)
POTASSIUM REFLEX MAGNESIUM: 4.5 MMOL/L (ref 3.5–5)
PRO-BNP: 65 PG/ML (ref 0–125)
RBC # BLD: 4.56 E12/L (ref 3.5–5.5)
SODIUM BLD-SCNC: 141 MMOL/L (ref 132–146)
TOTAL PROTEIN: 7.4 G/DL (ref 6.4–8.3)
TROPONIN, HIGH SENSITIVITY: <6 NG/L (ref 0–9)
WBC # BLD: 8.7 E9/L (ref 4.5–11.5)

## 2022-04-02 PROCEDURE — 6370000000 HC RX 637 (ALT 250 FOR IP): Performed by: STUDENT IN AN ORGANIZED HEALTH CARE EDUCATION/TRAINING PROGRAM

## 2022-04-02 PROCEDURE — 6360000004 HC RX CONTRAST MEDICATION: Performed by: RADIOLOGY

## 2022-04-02 PROCEDURE — 85025 COMPLETE CBC W/AUTO DIFF WBC: CPT

## 2022-04-02 PROCEDURE — 36415 COLL VENOUS BLD VENIPUNCTURE: CPT

## 2022-04-02 PROCEDURE — 80053 COMPREHEN METABOLIC PANEL: CPT

## 2022-04-02 PROCEDURE — 93005 ELECTROCARDIOGRAM TRACING: CPT | Performed by: STUDENT IN AN ORGANIZED HEALTH CARE EDUCATION/TRAINING PROGRAM

## 2022-04-02 PROCEDURE — 83880 ASSAY OF NATRIURETIC PEPTIDE: CPT

## 2022-04-02 PROCEDURE — 71275 CT ANGIOGRAPHY CHEST: CPT

## 2022-04-02 PROCEDURE — 99285 EMERGENCY DEPT VISIT HI MDM: CPT

## 2022-04-02 PROCEDURE — 84484 ASSAY OF TROPONIN QUANT: CPT

## 2022-04-02 RX ORDER — ASPIRIN 81 MG/1
162 TABLET, CHEWABLE ORAL ONCE
Status: COMPLETED | OUTPATIENT
Start: 2022-04-02 | End: 2022-04-02

## 2022-04-02 RX ADMIN — IOPAMIDOL 75 ML: 755 INJECTION, SOLUTION INTRAVENOUS at 23:36

## 2022-04-02 RX ADMIN — ASPIRIN 162 MG: 81 TABLET, CHEWABLE ORAL at 23:14

## 2022-04-02 ASSESSMENT — PAIN DESCRIPTION - PAIN TYPE: TYPE: ACUTE PAIN

## 2022-04-02 ASSESSMENT — PAIN DESCRIPTION - LOCATION: LOCATION: CHEST

## 2022-04-02 ASSESSMENT — PAIN DESCRIPTION - FREQUENCY: FREQUENCY: CONTINUOUS

## 2022-04-02 ASSESSMENT — PAIN DESCRIPTION - DESCRIPTORS: DESCRIPTORS: ACHING

## 2022-04-03 VITALS
DIASTOLIC BLOOD PRESSURE: 75 MMHG | TEMPERATURE: 98.6 F | BODY MASS INDEX: 23.63 KG/M2 | OXYGEN SATURATION: 97 % | WEIGHT: 147 LBS | RESPIRATION RATE: 16 BRPM | SYSTOLIC BLOOD PRESSURE: 139 MMHG | HEART RATE: 67 BPM | HEIGHT: 66 IN

## 2022-04-03 LAB
EKG ATRIAL RATE: 67 BPM
EKG P AXIS: 51 DEGREES
EKG P-R INTERVAL: 172 MS
EKG Q-T INTERVAL: 390 MS
EKG QRS DURATION: 82 MS
EKG QTC CALCULATION (BAZETT): 412 MS
EKG R AXIS: 29 DEGREES
EKG T AXIS: 19 DEGREES
EKG VENTRICULAR RATE: 67 BPM
TROPONIN, HIGH SENSITIVITY: <6 NG/L (ref 0–9)

## 2022-04-03 PROCEDURE — 36415 COLL VENOUS BLD VENIPUNCTURE: CPT

## 2022-04-03 PROCEDURE — 96374 THER/PROPH/DIAG INJ IV PUSH: CPT

## 2022-04-03 PROCEDURE — 2500000003 HC RX 250 WO HCPCS: Performed by: STUDENT IN AN ORGANIZED HEALTH CARE EDUCATION/TRAINING PROGRAM

## 2022-04-03 PROCEDURE — 2580000003 HC RX 258: Performed by: STUDENT IN AN ORGANIZED HEALTH CARE EDUCATION/TRAINING PROGRAM

## 2022-04-03 PROCEDURE — 93010 ELECTROCARDIOGRAM REPORT: CPT | Performed by: INTERNAL MEDICINE

## 2022-04-03 PROCEDURE — 6370000000 HC RX 637 (ALT 250 FOR IP): Performed by: STUDENT IN AN ORGANIZED HEALTH CARE EDUCATION/TRAINING PROGRAM

## 2022-04-03 PROCEDURE — A4216 STERILE WATER/SALINE, 10 ML: HCPCS | Performed by: STUDENT IN AN ORGANIZED HEALTH CARE EDUCATION/TRAINING PROGRAM

## 2022-04-03 PROCEDURE — 84484 ASSAY OF TROPONIN QUANT: CPT

## 2022-04-03 RX ADMIN — LIDOCAINE HYDROCHLORIDE: 20 SOLUTION ORAL; TOPICAL at 00:44

## 2022-04-03 RX ADMIN — FAMOTIDINE 20 MG: 10 INJECTION, SOLUTION INTRAVENOUS at 00:44

## 2022-04-03 NOTE — ED NOTES
Pt ambulated around the unit. Gait steady. Denies dizziness or SOB. Sp02 level 98% during entire walk. Doctor Alejandro Cerda made aware.      Rosina Lazar RN  04/03/22 0636

## 2022-04-03 NOTE — ED NOTES
Pt reports she feels as though she has had heartburn all day. Pt is intermittently belching during today's visit to ER. Doctor Uday Jon aware. Awaiting orders.      Gali Salinas RN  04/03/22 7345

## 2022-04-03 NOTE — ED PROVIDER NOTES
Department of Emergency Medicine   ED  Provider Note  Admit Date/RoomTime: 4/2/2022  9:39 PM  ED Room: 14/14          History of Present Illness:  4/2/22, Time: 10:13 PM EDT  Chief Complaint   Patient presents with    Hypertension    Shortness of Breath     Pt stated it started this am          Cecile Santana is a 74 y.o. female presenting to the ED for HTN and shortness of breath, beginning this morning. The complaint has been persistent, moderate in severity, and worsened by exertion. The patient is a 15-year-old female who presents the emergency department complaining of hypertension and shortness of breath. The patient symptoms are sudden onset this morning, has been persistent, moderate in severity, nothing makes it better. It is worse with exertion. She states that she was shopping earlier today she was walking upstairs and she felt short of breath after that. States that she has been checking her blood pressure at home and has been running high and she is not on any medications at home for her blood pressure. Patient states that throughout the day today she just felt more short of breath than usual.  She denies any lightheadedness, dizziness, syncope, blurry vision, double vision, numbness, tingling, unilateral weakness, chest pain, palpitations, fall, trauma, recent travel, recent hospitalization, recent surgery, history of blood clots or bleeding disorders, abdominal pain, nausea, vomiting, diarrhea, or other acute symptoms or concerns.     Review of Systems:   A complete review of systems was performed and pertinent positives and negatives are stated within HPI, all other systems reviewed and are negative.        --------------------------------------------- PAST HISTORY ---------------------------------------------  Past Medical History:  has a past medical history of Acid reflux, Allergic rhinitis, Asthma, Diverticulosis, Irritable bowel, Pinched nerve in neck, Staph aureus infection, and UTI (urinary tract infection). Past Surgical History:  has a past surgical history that includes hernia repair; Dental surgery; Tubal ligation; cyst removal; Tonsillectomy; Breast surgery; Nasal fracture surgery (1975); Nose surgery (1976); Cholecystectomy (07/14/2015); and Mandible surgery. Social History:  reports that she quit smoking about 5 years ago. She has a 10.00 pack-year smoking history. She has never used smokeless tobacco. She reports current alcohol use. She reports that she does not use drugs. Family History: family history includes Alzheimer's Disease in her father; Cirrhosis in her mother. . Unless otherwise noted, family history is non contributory    The patients home medications have been reviewed. Allergies: Lipitor [atorvastatin], Bentyl [dicyclomine hcl], Chlorhexidine gluconate, Ciprofloxacin, Codeine, Crestor [rosuvastatin], Entex [ami-eladio], Florastor [yeast], Levaquin [levofloxacin in d5w], Levsin [hyoscyamine sulfate], Librax [chlordiazepoxide-clidinium], Morphine, Reglan [metoclopramide], Septra [bactrim], Singulair [montelukast sodium], Xyzal [levocetirizine], Zetia [ezetimibe], Adhesive tape, Dexamethasone, Doxycycline calcium, Oxytetracycline, Sumycin [tetracycline hcl], and Tetracyclines & related    I have reviewed the past medical history, past surgical history, social history, and family history    ---------------------------------------------------PHYSICAL EXAM--------------------------------------    Constitutional/General: Alert and oriented x3, patient is sitting up in bed resting comfortably in no acute distress  Head: Normocephalic and atraumatic  Eyes:  EOMI, sclera non icteric  ENT: Oropharynx clear, handling secretions, no trismus, no asymmetry of the posterior oropharynx or uvular edema  Neck: Supple, full ROM, no stridor, no meningeal signs  Respiratory: Lungs clear to auscultation bilaterally, no wheezes, rales, or rhonchi.  Not in respiratory distress  Cardiovascular:  Regular rate. Regular rhythm. No murmurs, no gallops, no rubs. 2+ distal pulses. Equal extremity pulses. Gastrointestinal:  Abdomen Soft, Non tender, Non distended. No rebound, guarding, or rigidity. No pulsatile masses. Musculoskeletal: Moves all extremities x 4. Warm and well perfused, no clubbing, no cyanosis, no edema. Capillary refill <3 seconds  Skin: skin warm and dry. No rashes. Neurologic: GCS 15, no focal deficits, symmetric strength 5/5 in the upper and lower extremities bilaterally  Psychiatric: Normal Affect    -------------------------------------------------- RESULTS -------------------------------------------------  I have personally reviewed all laboratory and imaging results for this patient. Results are listed below.      LABS: (Lab results interpreted by me)  Results for orders placed or performed during the hospital encounter of 04/02/22   CBC with Auto Differential   Result Value Ref Range    WBC 8.7 4.5 - 11.5 E9/L    RBC 4.56 3.50 - 5.50 E12/L    Hemoglobin 13.8 11.5 - 15.5 g/dL    Hematocrit 41.3 34.0 - 48.0 %    MCV 90.6 80.0 - 99.9 fL    MCH 30.3 26.0 - 35.0 pg    MCHC 33.4 32.0 - 34.5 %    RDW 12.6 11.5 - 15.0 fL    Platelets 960 397 - 309 E9/L    MPV 9.7 7.0 - 12.0 fL    Neutrophils % 66.9 43.0 - 80.0 %    Immature Granulocytes % 0.2 0.0 - 5.0 %    Lymphocytes % 19.4 (L) 20.0 - 42.0 %    Monocytes % 10.7 2.0 - 12.0 %    Eosinophils % 2.0 0.0 - 6.0 %    Basophils % 0.8 0.0 - 2.0 %    Neutrophils Absolute 5.83 1.80 - 7.30 E9/L    Immature Granulocytes # 0.02 E9/L    Lymphocytes Absolute 1.69 1.50 - 4.00 E9/L    Monocytes Absolute 0.93 0.10 - 0.95 E9/L    Eosinophils Absolute 0.17 0.05 - 0.50 E9/L    Basophils Absolute 0.07 0.00 - 0.20 E9/L   Comprehensive Metabolic Panel w/ Reflex to MG   Result Value Ref Range    Sodium 141 132 - 146 mmol/L    Potassium reflex Magnesium 4.5 3.5 - 5.0 mmol/L    Chloride 104 98 - 107 mmol/L    CO2 25 22 - 29 mmol/L    Anion Gap 12 7 - 16 mmol/L    Glucose 108 (H) 74 - 99 mg/dL    BUN 19 6 - 23 mg/dL    CREATININE 0.7 0.5 - 1.0 mg/dL    GFR Non-African American >60 >=60 mL/min/1.73    GFR African American >60     Calcium 9.8 8.6 - 10.2 mg/dL    Total Protein 7.4 6.4 - 8.3 g/dL    Albumin 4.4 3.5 - 5.2 g/dL    Total Bilirubin <0.2 0.0 - 1.2 mg/dL    Alkaline Phosphatase 110 (H) 35 - 104 U/L    ALT 20 0 - 32 U/L    AST 19 0 - 31 U/L   Troponin   Result Value Ref Range    Troponin, High Sensitivity <6 0 - 9 ng/L   Brain Natriuretic Peptide   Result Value Ref Range    Pro-BNP 65 0 - 125 pg/mL   Troponin   Result Value Ref Range    Troponin, High Sensitivity <6 0 - 9 ng/L   EKG 12 Lead   Result Value Ref Range    Ventricular Rate 67 BPM    Atrial Rate 67 BPM    P-R Interval 172 ms    QRS Duration 82 ms    Q-T Interval 390 ms    QTc Calculation (Bazett) 412 ms    P Axis 51 degrees    R Axis 29 degrees    T Axis 19 degrees   ,       RADIOLOGY:  Interpreted by Radiologist unless otherwise specified  CTA CHEST W CONTRAST   Final Result   Mild dependent atelectasis. No acute cardiopulmonary process or identified pulmonary embolism. Short-term follow-up if symptoms persist.      RECOMMENDATIONS:   Unavailable               EKG Interpretation  Interpreted by emergency department physician, Dr. Eliseo West    EKG: This EKG is signed and interpreted by me.     Rate: 67  Rhythm: Sinus  Interpretation: Normal sinus rhythm, normal axis, no acute ST elevations or depressions, intervals are within normal limits, QTC is 412  Comparison: stable as compared to patient's most recent EKG     ------------------------- NURSING NOTES AND VITALS REVIEWED ---------------------------   The nursing notes within the ED encounter and vital signs as below have been reviewed by myself  /75   Pulse 67   Temp 98.6 °F (37 °C) (Oral)   Resp 16   Ht 5' 6\" (1.676 m)   Wt 147 lb (66.7 kg)   SpO2 97%   BMI 23.73 kg/m²     Oxygen Saturation Interpretation: Normal    The patients available past medical records and past encounters were reviewed. ------------------------------ ED COURSE/MEDICAL DECISION MAKING----------------------  Medications   aspirin chewable tablet 162 mg (162 mg Oral Given 4/2/22 2314)   iopamidol (ISOVUE-370) 76 % injection 75 mL (75 mLs IntraVENous Given 4/2/22 2336)   aluminum & magnesium hydroxide-simethicone (MAALOX) 30 mL, lidocaine viscous hcl (XYLOCAINE) 5 mL (GI COCKTAIL) ( Oral Given 4/3/22 0044)   famotidine (PEPCID) 20 mg in sodium chloride (PF) 10 mL injection (20 mg IntraVENous Given 4/3/22 0044)           The cardiac monitor revealed NSR with a heart rate in the 60s as interpreted by me. The cardiac monitor was ordered secondary to the patient's shortness of breath and to monitor the patient for dysrhythmia. CPT [id number]       I, Dr. Grace Allen, am the primary provider of record    Medical Decision Making:   The patient is a 67-year-old female presents the emergency department complaining of hypertension and shortness of breath. She is hemodynamically stable, nontoxic, and in no acute distress. There are no focal neurological deficits. EKG is normal sinus rhythm and does not show any evidence of acute ischemia. Delta troponin was negative. Low suspicion for cardiac etiology. BNP is negative. Did obtain CTA of her chest which was negative for PE or other acute abnormality. Patient ambulated around the emergency department and was asymptomatic and not hypoxic. She was feeling better after I discussed the results with her. Use shared decision making and she states that she will follow-up closely with her family doctor. Strict return precautions were given. Patient agreed with plan. Oxygen Saturation Interpretation: 97 % on room air. Re-Evaluations:  ED Course as of 04/05/22 2043   Debra Rojas Apr 03, 2022 0204 Patient is resting comfortably and in no distress. Used shared decision making.  She wants to go home and follow up as an outpatient. [KG]      ED Course User Index  [KG] Refugio Washington DO           This patient's ED course included: a personal history and physicial examination, re-evaluation prior to disposition, multiple bedside re-evaluations, IV medications, cardiac monitoring, continuous pulse oximetry and complex medical decision making and emergency management    This patient has remained hemodynamically stable during their ED course. Counseling: The emergency provider has spoken with the patient and discussed todays results, in addition to providing specific details for the plan of care and counseling regarding the diagnosis and prognosis. Questions are answered at this time and they are agreeable with the plan.       --------------------------------- IMPRESSION AND DISPOSITION ---------------------------------    IMPRESSION  1. Shortness of breath        DISPOSITION  Disposition: Discharge to home  Patient condition is stable        NOTE: This report was transcribed using voice recognition software.  Every effort was made to ensure accuracy; however, inadvertent computerized transcription errors may be present       Refugio Washington DO  04/05/22 2043

## 2022-11-22 ENCOUNTER — HOSPITAL ENCOUNTER (OUTPATIENT)
Age: 67
Discharge: HOME | End: 2022-11-22
Payer: MEDICARE

## 2022-11-22 LAB
ALBUMIN SERPL-MCNC: 4.2 G/DL (ref 3.5–5.2)
ALP BLD-CCNC: 90 U/L (ref 35–104)
ALT SERPL-CCNC: 17 U/L (ref 0–32)
ANION GAP SERPL CALCULATED.3IONS-SCNC: 12 MMOL/L (ref 7–16)
AST SERPL-CCNC: 20 U/L (ref 0–31)
BACTERIA: ABNORMAL /HPF
BASOPHILS ABSOLUTE: 0.06 E9/L (ref 0–0.2)
BASOPHILS RELATIVE PERCENT: 1.1 % (ref 0–2)
BILIRUB SERPL-MCNC: 0.6 MG/DL (ref 0–1.2)
BILIRUBIN URINE: NEGATIVE
BLOOD, URINE: ABNORMAL
BUN BLDV-MCNC: 13 MG/DL (ref 6–23)
CALCIUM SERPL-MCNC: 9.7 MG/DL (ref 8.6–10.2)
CHLORIDE BLD-SCNC: 104 MMOL/L (ref 98–107)
CHOLESTEROL, TOTAL: 248 MG/DL (ref 0–199)
CLARITY: CLEAR
CO2: 24 MMOL/L (ref 22–29)
COLOR: YELLOW
CREAT SERPL-MCNC: 0.7 MG/DL (ref 0.5–1)
EOSINOPHILS ABSOLUTE: 0.09 E9/L (ref 0.05–0.5)
EOSINOPHILS RELATIVE PERCENT: 1.7 % (ref 0–6)
EPITHELIAL CELLS, UA: ABNORMAL /HPF
FOLATE: >20 NG/ML (ref 4.8–24.2)
GFR SERPL CREATININE-BSD FRML MDRD: >60 ML/MIN/1.73
GLUCOSE BLD-MCNC: 88 MG/DL (ref 74–99)
GLUCOSE URINE: NEGATIVE MG/DL
HCT VFR BLD CALC: 43.9 % (ref 34–48)
HDLC SERPL-MCNC: 79 MG/DL
HEMOGLOBIN: 14.5 G/DL (ref 11.5–15.5)
IMMATURE GRANULOCYTES #: 0.02 E9/L
IMMATURE GRANULOCYTES %: 0.4 % (ref 0–5)
KETONES, URINE: NEGATIVE MG/DL
LDL CHOLESTEROL CALCULATED: 153 MG/DL (ref 0–99)
LEUKOCYTE ESTERASE, URINE: ABNORMAL
LYMPHOCYTES ABSOLUTE: 1.41 E9/L (ref 1.5–4)
LYMPHOCYTES RELATIVE PERCENT: 26.5 % (ref 20–42)
MCH RBC QN AUTO: 30.4 PG (ref 26–35)
MCHC RBC AUTO-ENTMCNC: 33 % (ref 32–34.5)
MCV RBC AUTO: 92 FL (ref 80–99.9)
MONOCYTES ABSOLUTE: 0.43 E9/L (ref 0.1–0.95)
MONOCYTES RELATIVE PERCENT: 8.1 % (ref 2–12)
NEUTROPHILS ABSOLUTE: 3.31 E9/L (ref 1.8–7.3)
NEUTROPHILS RELATIVE PERCENT: 62.2 % (ref 43–80)
NITRITE, URINE: NEGATIVE
PDW BLD-RTO: 12.9 FL (ref 11.5–15)
PH UA: 6 (ref 5–9)
PLATELET # BLD: 198 E9/L (ref 130–450)
PMV BLD AUTO: 9.8 FL (ref 7–12)
POTASSIUM SERPL-SCNC: 4.4 MMOL/L (ref 3.5–5)
PROTEIN UA: NEGATIVE MG/DL
RBC # BLD: 4.77 E12/L (ref 3.5–5.5)
RBC UA: ABNORMAL /HPF (ref 0–2)
SODIUM BLD-SCNC: 140 MMOL/L (ref 132–146)
SPECIFIC GRAVITY UA: 1.02 (ref 1–1.03)
TOTAL PROTEIN: 6.8 G/DL (ref 6.4–8.3)
TRIGL SERPL-MCNC: 79 MG/DL (ref 0–149)
UROBILINOGEN, URINE: 0.2 E.U./DL
VITAMIN B-12: 860 PG/ML (ref 211–946)
VITAMIN D 25-HYDROXY: 46 NG/ML (ref 30–100)
VLDLC SERPL CALC-MCNC: 16 MG/DL
WBC # BLD: 5.3 E9/L (ref 4.5–11.5)
WBC UA: ABNORMAL /HPF (ref 0–5)

## 2022-11-22 PROCEDURE — 85025 COMPLETE CBC W/AUTO DIFF WBC: CPT

## 2022-11-22 PROCEDURE — 81001 URINALYSIS AUTO W/SCOPE: CPT

## 2022-11-22 PROCEDURE — 80053 COMPREHEN METABOLIC PANEL: CPT

## 2022-11-22 PROCEDURE — 82746 ASSAY OF FOLIC ACID SERUM: CPT

## 2022-11-22 PROCEDURE — 36415 COLL VENOUS BLD VENIPUNCTURE: CPT

## 2022-11-22 PROCEDURE — 82306 VITAMIN D 25 HYDROXY: CPT

## 2022-11-22 PROCEDURE — 80061 LIPID PANEL: CPT

## 2022-11-22 PROCEDURE — 82607 VITAMIN B-12: CPT

## 2023-01-05 ENCOUNTER — OFFICE VISIT (OUTPATIENT)
Dept: ENDOCRINOLOGY | Age: 68
End: 2023-01-05
Payer: MEDICARE

## 2023-01-05 VITALS
OXYGEN SATURATION: 99 % | HEART RATE: 65 BPM | WEIGHT: 146 LBS | SYSTOLIC BLOOD PRESSURE: 129 MMHG | BODY MASS INDEX: 23.46 KG/M2 | HEIGHT: 66 IN | DIASTOLIC BLOOD PRESSURE: 83 MMHG | RESPIRATION RATE: 18 BRPM

## 2023-01-05 DIAGNOSIS — E04.2 MULTINODULAR GOITER: ICD-10-CM

## 2023-01-05 DIAGNOSIS — E55.9 VITAMIN D DEFICIENCY: ICD-10-CM

## 2023-01-05 DIAGNOSIS — E04.2 MULTINODULAR GOITER: Primary | ICD-10-CM

## 2023-01-05 LAB
T4 FREE: 1.28 NG/DL (ref 0.93–1.7)
TSH SERPL DL<=0.05 MIU/L-ACNC: 1.58 UIU/ML (ref 0.27–4.2)

## 2023-01-05 PROCEDURE — 1090F PRES/ABSN URINE INCON ASSESS: CPT | Performed by: INTERNAL MEDICINE

## 2023-01-05 PROCEDURE — 1123F ACP DISCUSS/DSCN MKR DOCD: CPT | Performed by: INTERNAL MEDICINE

## 2023-01-05 PROCEDURE — G8400 PT W/DXA NO RESULTS DOC: HCPCS | Performed by: INTERNAL MEDICINE

## 2023-01-05 PROCEDURE — 3017F COLORECTAL CA SCREEN DOC REV: CPT | Performed by: INTERNAL MEDICINE

## 2023-01-05 PROCEDURE — G8484 FLU IMMUNIZE NO ADMIN: HCPCS | Performed by: INTERNAL MEDICINE

## 2023-01-05 PROCEDURE — G8427 DOCREV CUR MEDS BY ELIG CLIN: HCPCS | Performed by: INTERNAL MEDICINE

## 2023-01-05 PROCEDURE — 99214 OFFICE O/P EST MOD 30 MIN: CPT | Performed by: INTERNAL MEDICINE

## 2023-01-05 PROCEDURE — 1036F TOBACCO NON-USER: CPT | Performed by: INTERNAL MEDICINE

## 2023-01-05 PROCEDURE — G8420 CALC BMI NORM PARAMETERS: HCPCS | Performed by: INTERNAL MEDICINE

## 2023-01-05 NOTE — PROGRESS NOTES
[address] [place] [address] Department of Endocrinology Diabetes and Metabolism   [address] [facility] [address]   Phone: [phone]  Fax: [phone]    Date of Service: 1/5/2023  Primary Care Physician: Val Wharton MD  Provider: Sandip Ball MD            Reason for the visit:  Multinodular goiter     History of Present Illness: The history is provided by the patient. No  was used. Accuracy of the patient data is excellent. Josefina Justice is a very pleasant 69 y.o. female seen today for evaluation and management of multinodular goiter     The patient was found to have enlarged thyroid on a routine physical examination 4-5 years   Thyroid US 7/2020   Rt lobe measures 4.3 x 1 x 1.5 cm --> 5 mm nodule   Lt lobe measures 3.1 x 0.8 x 1.3 cm --> 4 mm cystic nodule   Isthmus 3 mm --> no nodules     US 2/2022:  Right thyroid lobe:  4.0 x 0.9 x 1.3 cm  Left thyroid lobe:  4.1 x 0.8 x 1.3 cm  Isthmus:  2 mm  Thyroid gland demonstrates normal echotexture and vascularity. Nodules: There are few small nodules in the bilateral thyroid lobes. In the right mid to upper pole there is a 6 mm slightly hypoechoic TR 4 nodule. Left lower pole 4 mm hypoechoic TR 4 nodule. Cervical lymphadenopathy: No abnormal lymph nodes in the imaged portions of  the neck. Josefina Justice denies any new lumps, bumps in her neck, voice change, or shortness of breath. No family history of thyroid cancer. No prior history of radiation to head or neck region.   Lab Results   Component Value Date/Time    TSH 1.340 01/04/2022 11:38 AM    T4FREE 1.20 01/04/2022 11:38 AM    FT3 2.7 07/07/2020 12:00 AM    TSI <0.10 05/25/2021 08:45 AM    TPOABS 2.2 05/25/2021 08:45 AM    THGAB <0.9 05/25/2021 08:45 AM       On multivitamin daily     PAST MEDICAL HISTORY   Past Medical History:   Diagnosis Date    Acid reflux     Allergic rhinitis     Asthma     ALLERY INDUCED    Diverticulosis     Irritable bowel Pinched nerve in neck     resolved    Staph aureus infection     UTI (urinary tract infection) 06/20/2015    resolved       PAST SURGICAL HISTORY   Past Surgical History:   Procedure Laterality Date    BREAST SURGERY      cyst    CHOLECYSTECTOMY  07/14/2015    laparoscopic    CYST REMOVAL      behind right ear    DENTAL SURGERY      HERNIA REPAIR      MANDIBLE SURGERY      NASAL FRACTURE SURGERY  1975    NOSE SURGERY  1976    TONSILLECTOMY      TUBAL LIGATION         SOCIAL HISTORY   Tobacco:   reports that she quit smoking about 6 years ago. Her smoking use included cigarettes. She has a 10.00 pack-year smoking history. She has never used smokeless tobacco.  Alcohol:   reports current alcohol use. Drugs:   reports no history of drug use.     FAMILY HISTORY   Family History   Problem Relation Age of Onset    Cirrhosis Mother     Alzheimer's Disease Father        ALLERGIES AND DRUG REACTIONS   Allergies   Allergen Reactions    Lipitor [Atorvastatin] Nausea And Vomiting     violent n/v    Bentyl [Dicyclomine Hcl] Other (See Comments)     Dizzy      Chlorhexidine Gluconate Other (See Comments)     \" asthma attack\"    Ciprofloxacin Other (See Comments)     Extreme dizziness    Codeine      States cannot take, \"it's like speed\"     Crestor [Rosuvastatin] Other (See Comments)     Legs ache    Entex [Ami-Hubert] Other (See Comments)     HEADACHES    Florastor [Yeast]     Levaquin [Levofloxacin In D5w]      Arm pain in joints      Levsin [Hyoscyamine Sulfate] Other (See Comments)     dizzy    Librax [Chlordiazepoxide-Clidinium] Other (See Comments)     dizzy    Morphine      Headache      Paxil [Paroxetine] Other (See Comments)     Shake      Reglan [Metoclopramide] Other (See Comments)     Very dizzy    Septra [Bactrim] Other (See Comments)     Unsure of rxn    Singulair [Montelukast Sodium]     Xyzal [Levocetirizine]      asthma    Zetia [Ezetimibe] Other (See Comments)     \"feels like someone poured acid down my stomach\" Adhesive Tape Rash     Blisters with prolong contact, > 24 hours; skin peels    Desvenlafaxine Anxiety and Other (See Comments)     Muscle aches    Dexamethasone     Doxycycline Calcium Hives and Rash     ANY CYCLINES    Oxytetracycline Hives and Rash    Sumycin [Tetracycline Hcl] Hives and Rash    Tetracyclines & Related Hives and Rash       CURRENT MEDICATIONS   Current Outpatient Medications   Medication Sig Dispense Refill    desvenlafaxine succinate (PRISTIQ) 25 MG TB24 extended release tablet TAKE ONE TABLET BY MOUTH EVERY DAY      LORazepam (ATIVAN) 0.5 MG tablet Take 0.5 mg by mouth every 6 hours as needed for Anxiety. Probiotic Product (VSL#3 DS PO) Take by mouth      hydrocortisone 2.5 % cream Apply topically 2 times daily Apply topically 2 times daily. 1 Tube 5    Multiple Vitamins-Minerals (WOMENS MULTIVITAMIN PO) Take by mouth      Lactobacillus (ULTIMATE PROBIOTIC FORMULA) CAPS Take 1 capsule by mouth daily      Ibuprofen-diphenhydrAMINE HCl 200-25 MG CAPS Take 2 tablets by mouth as needed      esomeprazole (NEXIUM) 20 MG CPDR Take 1 capsule by mouth daily      spironolactone (ALDACTONE) 25 MG tablet Take 25 mg by mouth daily        No current facility-administered medications for this visit. Review of Systems  Constitutional: No fever, no chills, no diaphoresis, no generalized weakness. HEENT: No blurred vision, No sore throat, no ear pain, no hair loss  Neck: denied any neck swelling, difficulty swallowing,   Cadrdiopulomary: No CP, SOB or palpitation, No orthopnea or PND. No cough or wheezing. GI: No N/V/D, no constipation, No abdominal pain, no melena or hematochezia   : Denied any dysuria, hematuria, flank pain, discharge, or incontinence. Skin: denied any rash, ulcer, Hirsute, or hyperpigmentation. MSK: denied any joint deformity, joint pain/swelling, muscle pain, or back pain.   Neuro: no numbess, no tingling, no weakness,     OBJECTIVE    /83   Pulse 65   Resp 18   Ht 5' 6\" (1.676 m)   Wt 146 lb (66.2 kg)   SpO2 99%   BMI 23.57 kg/m²   BP Readings from Last 4 Encounters:   01/05/23 129/83   12/02/22 115/71   11/11/22 106/76   10/25/22 120/72     Wt Readings from Last 6 Encounters:   01/05/23 146 lb (66.2 kg)   12/02/22 143 lb (64.9 kg)   11/11/22 142 lb (64.4 kg)   10/25/22 143 lb (64.9 kg)   04/02/22 147 lb (66.7 kg)   02/23/22 146 lb (66.2 kg)       Physical examination:  General: awake alert, oriented x3, no abnormal position or movements. HEENT: normocephalic non traumatic  Neck: supple, no LN enlargement, mild thyromegaly, I couldn't palpate any thyroid nodules, no thyroid tenderness, no JVD. Pulm: Clear equal air entry no added sounds, no wheezing or rhonchi    CVS: S1 + S2, no murmur, no heave. Dorsalis pedis pulse palpable   Abd: soft lax, no tenderness, no organomegaly, audible bowel sounds. Skin: warm, no lesions, no rash.  No Palmar erythema  Musculoskeletal: No back tenderness, no kyphosis/scoliosis     Neuro: CN intact, sensation normal , muscle power normal. No tremors   Psych: normal mood, and affect    Review of Laboratory Data:  I personally reviewed the following labs:   Lab Results   Component Value Date/Time    WBC 5.3 11/22/2022 09:27 AM    RBC 4.77 11/22/2022 09:27 AM    HGB 14.5 11/22/2022 09:27 AM    HCT 43.9 11/22/2022 09:27 AM    MCV 92.0 11/22/2022 09:27 AM    MCH 30.4 11/22/2022 09:27 AM    MCHC 33.0 11/22/2022 09:27 AM    RDW 12.9 11/22/2022 09:27 AM     11/22/2022 09:27 AM    MPV 9.8 11/22/2022 09:27 AM      Lab Results   Component Value Date/Time     11/22/2022 09:27 AM    K 4.4 11/22/2022 09:27 AM    K 4.5 04/02/2022 10:22 PM    CO2 24 11/22/2022 09:27 AM    BUN 13 11/22/2022 09:27 AM    CREATININE 0.7 11/22/2022 09:27 AM    CALCIUM 9.7 11/22/2022 09:27 AM    LABGLOM >60 11/22/2022 09:27 AM    GFRAA >60 04/02/2022 10:22 PM      Lab Results   Component Value Date/Time    TSH 1.340 01/04/2022 11:38 AM    T4FREE 1.20 01/04/2022 11:38 AM    FT3 2.7 07/07/2020 12:00 AM    TSI <0.10 05/25/2021 08:45 AM    TPOABS 2.2 05/25/2021 08:45 AM    THGAB <0.9 05/25/2021 08:45 AM     Lab Results   Component Value Date/Time    GLUCOSE 88 11/22/2022 09:27 AM     Lab Results   Component Value Date/Time    TRIG 79 11/22/2022 09:27 AM    HDL 79 11/22/2022 09:27 AM    LDLCALC 153 11/22/2022 09:27 AM    CHOL 248 11/22/2022 09:27 AM     Lab Results   Component Value Date/Time    VITD25 46 11/22/2022 09:27 AM    VITD25 45 05/25/2021 08:45 AM     ASSESSMENT & RECOMMENDATIONS   Chari Poole, a 69 y.o.-old female seen in for the following issues     Multinodular goiter   Thyroid nodules are very small measuring 4 and 5 mm   I have reviewed thyroid S images myself. Thyroid nodule are very small without suspicious feature  Will continue monitoring with intermittent US     vitD deficiency   Continue vitD supplement  Check level     I personally reviewed external notes from PCP and other patient's care team providers, and personally interpreted labs associated with the above diagnosis. I also ordered labs to further assess and manage the above addressed medical conditions. Return in about 1 year (around 1/5/2024) for Multinodular goiter, VitD deficiency. The above issues were reviewed with the patient who understood and agreed with the plan. Thank you for allowing us to participate in the care of this patient. Please do not hesitate to contact us with any additional questions. Diagnosis Orders   1. Multinodular goiter  TSH    T4, Free    US THYROID      2. Vitamin D deficiency          Manuel Hernandez MD  Endocrinologist, [facility])   [address] [facility], [address] [facility] [address] [address]   Phone: [phone]  Fax: [phone]  ------------------------------  An electronic signature was used to authenticate this note.  200 Laron Pedersen MD on 1/5/2023 at 9:52 AM

## 2023-01-08 ENCOUNTER — TELEPHONE (OUTPATIENT)
Dept: ENDOCRINOLOGY | Age: 68
End: 2023-01-08

## 2023-03-30 LAB
RBC, URINE: NORMAL /HPF (ref 0–5)
RENAL EPITHELIAL CELLS, URINE: NORMAL /HPF
SQUAMOUS EPITHELIAL CELLS, URINE: NORMAL /HPF
WBC, URINE: NORMAL /HPF (ref 0–5)

## 2023-03-31 LAB — URINE CULTURE: NORMAL

## 2023-04-20 ENCOUNTER — HOSPITAL ENCOUNTER (OUTPATIENT)
Age: 68
Discharge: HOME | End: 2023-04-20
Payer: MEDICARE

## 2023-04-20 PROCEDURE — 87186 SC STD MICRODIL/AGAR DIL: CPT

## 2023-04-20 PROCEDURE — 87088 URINE BACTERIA CULTURE: CPT

## 2023-04-22 LAB
BACTERIA UR CULT: ABNORMAL
ORGANISM: ABNORMAL

## 2023-05-22 LAB — URINE CULTURE: ABNORMAL

## 2023-05-28 ENCOUNTER — HOSPITAL ENCOUNTER (EMERGENCY)
Age: 68
Discharge: HOME | End: 2023-05-28
Attending: EMERGENCY MEDICINE
Payer: MEDICARE

## 2023-05-28 VITALS
TEMPERATURE: 98.4 F | BODY MASS INDEX: 23.95 KG/M2 | HEIGHT: 66 IN | SYSTOLIC BLOOD PRESSURE: 138 MMHG | WEIGHT: 149 LBS | HEART RATE: 72 BPM | OXYGEN SATURATION: 97 % | DIASTOLIC BLOOD PRESSURE: 89 MMHG | RESPIRATION RATE: 14 BRPM

## 2023-05-28 DIAGNOSIS — N30.00 ACUTE CYSTITIS WITHOUT HEMATURIA: Primary | ICD-10-CM

## 2023-05-28 LAB
ALBUMIN SERPL-MCNC: 4.4 G/DL (ref 3.5–5.2)
ALP SERPL-CCNC: 101 U/L (ref 35–104)
ALT SERPL-CCNC: 20 U/L (ref 0–32)
ANION GAP SERPL CALCULATED.3IONS-SCNC: 12 MMOL/L (ref 7–16)
AST SERPL-CCNC: 23 U/L (ref 0–31)
BACTERIA URNS QL MICRO: ABNORMAL /HPF
BASOPHILS # BLD: 0.07 E9/L (ref 0–0.2)
BASOPHILS NFR BLD: 1 % (ref 0–2)
BILIRUB SERPL-MCNC: 0.3 MG/DL (ref 0–1.2)
BILIRUB UR QL STRIP: NEGATIVE
BUN SERPL-MCNC: 16 MG/DL (ref 6–23)
CALCIUM SERPL-MCNC: 9.6 MG/DL (ref 8.6–10.2)
CHLORIDE SERPL-SCNC: 104 MMOL/L (ref 98–107)
CLARITY UR: ABNORMAL
CO2 SERPL-SCNC: 23 MMOL/L (ref 22–29)
COLOR UR: YELLOW
CREAT SERPL-MCNC: 0.7 MG/DL (ref 0.5–1)
EOSINOPHIL # BLD: 0.11 E9/L (ref 0.05–0.5)
EOSINOPHIL NFR BLD: 1.5 % (ref 0–6)
ERYTHROCYTE [DISTWIDTH] IN BLOOD BY AUTOMATED COUNT: 12.5 FL (ref 11.5–15)
GLUCOSE SERPL-MCNC: 111 MG/DL (ref 74–99)
GLUCOSE UR STRIP-MCNC: 100 MG/DL
HCT VFR BLD AUTO: 42.9 % (ref 34–48)
HGB BLD-MCNC: 14.2 G/DL (ref 11.5–15.5)
HGB UR QL STRIP: ABNORMAL
IMM GRANULOCYTES # BLD: 0.02 E9/L
IMM GRANULOCYTES NFR BLD: 0.3 % (ref 0–5)
KETONES UR STRIP-MCNC: 15 MG/DL
LACTATE BLDV-SCNC: 1.2 MMOL/L (ref 0.5–2.2)
LEUKOCYTE ESTERASE UR QL STRIP: ABNORMAL
LIPASE: 23 U/L (ref 13–60)
LYMPHOCYTES # BLD: 1.99 E9/L (ref 1.5–4)
LYMPHOCYTES NFR BLD: 27.1 % (ref 20–42)
MCH RBC QN AUTO: 29.5 PG (ref 26–35)
MCHC RBC AUTO-ENTMCNC: 33.1 % (ref 32–34.5)
MCV RBC AUTO: 89.2 FL (ref 80–99.9)
MONOCYTES # BLD: 0.67 E9/L (ref 0.1–0.95)
MONOCYTES NFR BLD: 9.1 % (ref 2–12)
NEUTROPHILS # BLD: 4.49 E9/L (ref 1.8–7.3)
NEUTS SEG NFR BLD: 61 % (ref 43–80)
NITRITE UR QL STRIP: POSITIVE
PH UR STRIP: 6 [PH] (ref 5–9)
PLATELET # BLD AUTO: 229 E9/L (ref 130–450)
PMV BLD AUTO: 9.9 FL (ref 7–12)
POTASSIUM SERPL-SCNC: 4.2 MMOL/L (ref 3.5–5)
PROT SERPL-MCNC: 7.2 G/DL (ref 6.4–8.3)
PROT UR STRIP-MCNC: 30 MG/DL
RBC # BLD AUTO: 4.81 E12/L (ref 3.5–5.5)
RBC #/AREA URNS HPF: >20 /HPF (ref 0–2)
SODIUM SERPL-SCNC: 139 MMOL/L (ref 132–146)
SP GR UR STRIP: 1.01 (ref 1–1.03)
UROBILINOGEN UR STRIP-ACNC: 1 E.U./DL
WBC # BLD: 7.4 E9/L (ref 4.5–11.5)
WBC #/AREA URNS HPF: >20 /HPF (ref 0–5)

## 2023-05-28 PROCEDURE — 87186 SC STD MICRODIL/AGAR DIL: CPT

## 2023-05-28 PROCEDURE — 81001 URINALYSIS AUTO W/SCOPE: CPT

## 2023-05-28 PROCEDURE — 2580000003 HC RX 258: Performed by: EMERGENCY MEDICINE

## 2023-05-28 PROCEDURE — 96374 THER/PROPH/DIAG INJ IV PUSH: CPT

## 2023-05-28 PROCEDURE — 87088 URINE BACTERIA CULTURE: CPT

## 2023-05-28 PROCEDURE — 83605 ASSAY OF LACTIC ACID: CPT

## 2023-05-28 PROCEDURE — 6360000002 HC RX W HCPCS: Performed by: EMERGENCY MEDICINE

## 2023-05-28 PROCEDURE — 80053 COMPREHEN METABOLIC PANEL: CPT

## 2023-05-28 PROCEDURE — 36415 COLL VENOUS BLD VENIPUNCTURE: CPT

## 2023-05-28 PROCEDURE — 99284 EMERGENCY DEPT VISIT MOD MDM: CPT

## 2023-05-28 PROCEDURE — 85025 COMPLETE CBC W/AUTO DIFF WBC: CPT

## 2023-05-28 PROCEDURE — 83690 ASSAY OF LIPASE: CPT

## 2023-05-28 RX ORDER — CEFDINIR 300 MG/1
300 CAPSULE ORAL 2 TIMES DAILY
Qty: 20 CAPSULE | Refills: 0 | Status: SHIPPED | OUTPATIENT
Start: 2023-05-28 | End: 2023-06-07

## 2023-05-28 RX ADMIN — CEFTRIAXONE 1000 MG: 1 INJECTION, POWDER, FOR SOLUTION INTRAMUSCULAR; INTRAVENOUS at 21:34

## 2023-05-29 NOTE — ED PROVIDER NOTES
[address] [facility] ENCOUNTER        Pt Name: Ed Ragsdale  MRN: [id number]  [place] 1955  Date of evaluation: 5/28/2023  Provider: Aleksandra Garcia DO  PCP: Angella Bran MD  Note Started: 8:55 PM EDT 5/28/23    CHIEF COMPLAINT       Chief Complaint   Patient presents with    Dysuria     Pt states she has been fighting a UTI since April. Just finished her last round of antibiotics on Friday and the infection is back       HISTORY OF PRESENT ILLNESS: 1 or more Elements   History From: Patient    Limitations to history : None    Ed Ragsdale is a 69 y.o. female who presents to the emergency department for difficulty urinating. The patient states she has been having issues with a urinary tract infection. First in April she was treated with nitrofurantoin and then recently it came back and she was treated with amoxicillin. She states that she continues to have pelvic pressure dysuria and urinary symptoms. She states she had some chills today and just has not been feeling well with a mild headache as well. Due to the symptoms she came to the ED to be evaluated. Nursing Notes were all reviewed and agreed with or any disagreements were addressed in the HPI. REVIEW OF EXTERNAL NOTE :       PDMP Monitoring:    Last PDMP Paul as Reviewed:  Review User Review Instant Review Result   Juan Benitez 12/15/2021 12:34 PM Reviewed PDMP [1]     Last Controlled Substance Monitoring Documentation      [address] ED from 2/21/2022 in [address] [place] [address] Emergency Department   Comments pt dc in good condition iv dc'd no questions filed at 02/21/2022 1548          Urine Drug Screenings (1 yr)    No resulted procedures found.        Medication Contract and Consent for Opioid Use Documents Filed        No documents found                      REVIEW OF SYSTEMS :           Positives and Pertinent negatives

## 2023-05-31 LAB
BACTERIA UR CULT: ABNORMAL
ORGANISM: ABNORMAL

## 2023-06-10 ENCOUNTER — APPOINTMENT (OUTPATIENT)
Dept: CT IMAGING | Age: 68
End: 2023-06-10
Payer: MEDICARE

## 2023-06-10 ENCOUNTER — APPOINTMENT (OUTPATIENT)
Dept: GENERAL RADIOLOGY | Age: 68
End: 2023-06-10
Payer: MEDICARE

## 2023-06-10 ENCOUNTER — HOSPITAL ENCOUNTER (EMERGENCY)
Age: 68
Discharge: HOME | End: 2023-06-10
Attending: EMERGENCY MEDICINE
Payer: MEDICARE

## 2023-06-10 VITALS
WEIGHT: 150 LBS | DIASTOLIC BLOOD PRESSURE: 70 MMHG | RESPIRATION RATE: 18 BRPM | TEMPERATURE: 97.8 F | SYSTOLIC BLOOD PRESSURE: 114 MMHG | OXYGEN SATURATION: 97 % | HEIGHT: 66 IN | BODY MASS INDEX: 24.11 KG/M2 | HEART RATE: 69 BPM

## 2023-06-10 DIAGNOSIS — T50.905A ADVERSE EFFECT OF DRUG, INITIAL ENCOUNTER: Primary | ICD-10-CM

## 2023-06-10 DIAGNOSIS — K57.90 DIVERTICULOSIS: ICD-10-CM

## 2023-06-10 LAB
ALBUMIN SERPL-MCNC: 4.9 G/DL (ref 3.5–5.2)
ALP SERPL-CCNC: 118 U/L (ref 35–104)
ALT SERPL-CCNC: 23 U/L (ref 0–32)
ANION GAP SERPL CALCULATED.3IONS-SCNC: 12 MMOL/L (ref 7–16)
AST SERPL-CCNC: 26 U/L (ref 0–31)
BASOPHILS # BLD: 0.06 E9/L (ref 0–0.2)
BASOPHILS NFR BLD: 0.7 % (ref 0–2)
BILIRUB SERPL-MCNC: 0.5 MG/DL (ref 0–1.2)
BILIRUB UR QL STRIP: NEGATIVE
BUN SERPL-MCNC: 18 MG/DL (ref 6–23)
CALCIUM SERPL-MCNC: 10.1 MG/DL (ref 8.6–10.2)
CHLORIDE SERPL-SCNC: 101 MMOL/L (ref 98–107)
CLARITY UR: CLEAR
CO2 SERPL-SCNC: 25 MMOL/L (ref 22–29)
COLOR UR: YELLOW
CREAT SERPL-MCNC: 0.8 MG/DL (ref 0.5–1)
EKG ATRIAL RATE: 70 BPM
EKG P AXIS: -15 DEGREES
EKG P-R INTERVAL: 152 MS
EKG Q-T INTERVAL: 382 MS
EKG QRS DURATION: 80 MS
EKG QTC CALCULATION (BAZETT): 412 MS
EKG R AXIS: 53 DEGREES
EKG T AXIS: 25 DEGREES
EKG VENTRICULAR RATE: 70 BPM
EOSINOPHIL # BLD: 0.05 E9/L (ref 0.05–0.5)
EOSINOPHIL NFR BLD: 0.6 % (ref 0–6)
ERYTHROCYTE [DISTWIDTH] IN BLOOD BY AUTOMATED COUNT: 12.3 FL (ref 11.5–15)
GLUCOSE SERPL-MCNC: 101 MG/DL (ref 74–99)
GLUCOSE UR STRIP-MCNC: NEGATIVE MG/DL
HCT VFR BLD AUTO: 48 % (ref 34–48)
HGB BLD-MCNC: 16.2 G/DL (ref 11.5–15.5)
HGB UR QL STRIP: NEGATIVE
IMM GRANULOCYTES # BLD: 0.02 E9/L
IMM GRANULOCYTES NFR BLD: 0.2 % (ref 0–5)
KETONES UR STRIP-MCNC: NEGATIVE MG/DL
LACTATE BLDV-SCNC: 1.9 MMOL/L (ref 0.5–2.2)
LEUKOCYTE ESTERASE UR QL STRIP: NEGATIVE
LIPASE: 26 U/L (ref 13–60)
LYMPHOCYTES # BLD: 1.35 E9/L (ref 1.5–4)
LYMPHOCYTES NFR BLD: 15.5 % (ref 20–42)
MCH RBC QN AUTO: 30.3 PG (ref 26–35)
MCHC RBC AUTO-ENTMCNC: 33.8 % (ref 32–34.5)
MCV RBC AUTO: 89.7 FL (ref 80–99.9)
MONOCYTES # BLD: 0.57 E9/L (ref 0.1–0.95)
MONOCYTES NFR BLD: 6.6 % (ref 2–12)
NEUTROPHILS # BLD: 6.64 E9/L (ref 1.8–7.3)
NEUTS SEG NFR BLD: 76.4 % (ref 43–80)
NITRITE UR QL STRIP: NEGATIVE
PH UR STRIP: 6 [PH] (ref 5–9)
PLATELET # BLD AUTO: 263 E9/L (ref 130–450)
PMV BLD AUTO: 9.8 FL (ref 7–12)
POTASSIUM SERPL-SCNC: 5 MMOL/L (ref 3.5–5)
PROT SERPL-MCNC: 8.1 G/DL (ref 6.4–8.3)
PROT UR STRIP-MCNC: NEGATIVE MG/DL
RBC # BLD AUTO: 5.35 E12/L (ref 3.5–5.5)
SODIUM SERPL-SCNC: 138 MMOL/L (ref 132–146)
SP GR UR STRIP: <=1.005 (ref 1–1.03)
TROPONIN, HIGH SENSITIVITY: <6 NG/L (ref 0–9)
UROBILINOGEN UR STRIP-ACNC: 0.2 E.U./DL
WBC # BLD: 8.7 E9/L (ref 4.5–11.5)

## 2023-06-10 PROCEDURE — 84484 ASSAY OF TROPONIN QUANT: CPT

## 2023-06-10 PROCEDURE — 70450 CT HEAD/BRAIN W/O DYE: CPT

## 2023-06-10 PROCEDURE — 83605 ASSAY OF LACTIC ACID: CPT

## 2023-06-10 PROCEDURE — 6360000002 HC RX W HCPCS: Performed by: NURSE PRACTITIONER

## 2023-06-10 PROCEDURE — 71046 X-RAY EXAM CHEST 2 VIEWS: CPT

## 2023-06-10 PROCEDURE — 85025 COMPLETE CBC W/AUTO DIFF WBC: CPT

## 2023-06-10 PROCEDURE — 6360000004 HC RX CONTRAST MEDICATION: Performed by: RADIOLOGY

## 2023-06-10 PROCEDURE — 2580000003 HC RX 258: Performed by: NURSE PRACTITIONER

## 2023-06-10 PROCEDURE — 93005 ELECTROCARDIOGRAM TRACING: CPT | Performed by: NURSE PRACTITIONER

## 2023-06-10 PROCEDURE — 81003 URINALYSIS AUTO W/O SCOPE: CPT

## 2023-06-10 PROCEDURE — 74177 CT ABD & PELVIS W/CONTRAST: CPT

## 2023-06-10 PROCEDURE — 80053 COMPREHEN METABOLIC PANEL: CPT

## 2023-06-10 PROCEDURE — 83690 ASSAY OF LIPASE: CPT

## 2023-06-10 PROCEDURE — 93010 ELECTROCARDIOGRAM REPORT: CPT | Performed by: INTERNAL MEDICINE

## 2023-06-10 RX ORDER — ONDANSETRON 2 MG/ML
4 INJECTION INTRAMUSCULAR; INTRAVENOUS ONCE
Status: COMPLETED | OUTPATIENT
Start: 2023-06-10 | End: 2023-06-10

## 2023-06-10 RX ORDER — 0.9 % SODIUM CHLORIDE 0.9 %
1000 INTRAVENOUS SOLUTION INTRAVENOUS ONCE
Status: COMPLETED | OUTPATIENT
Start: 2023-06-10 | End: 2023-06-10

## 2023-06-10 RX ADMIN — IOPAMIDOL 75 ML: 755 INJECTION, SOLUTION INTRAVENOUS at 13:40

## 2023-06-10 RX ADMIN — ONDANSETRON 4 MG: 2 INJECTION INTRAMUSCULAR; INTRAVENOUS at 12:10

## 2023-06-10 RX ADMIN — SODIUM CHLORIDE 1000 ML: 9 INJECTION, SOLUTION INTRAVENOUS at 12:08

## 2023-06-10 ASSESSMENT — PAIN - FUNCTIONAL ASSESSMENT: PAIN_FUNCTIONAL_ASSESSMENT: NONE - DENIES PAIN

## 2023-06-10 NOTE — ED PROVIDER NOTES
CHEST (2 VW)   Final Result   No acute disease. ED COURSE   Vitals:    Vitals:    06/10/23 0947 06/10/23 0949 06/10/23 1020 06/10/23 1224   BP:  139/86  (!) 142/70   Pulse: 90   70   Resp: 18   16   Temp:   97.9 °F (36.6 °C) 98.2 °F (36.8 °C)   SpO2:    99%   Weight: 150 lb (68 kg)      Height: 5' 6\" (1.676 m)          Patient was given the following medications:  Medications   ondansetron (ZOFRAN) injection 4 mg (4 mg IntraVENous Given 6/10/23 1210)   0.9 % sodium chloride bolus (0 mLs IntraVENous Stopped 6/10/23 1445)   iopamidol (ISOVUE-370) 76 % injection 75 mL (75 mLs IntraVENous Given 6/10/23 1340)       ED Course as of 06/10/23 1531   Sat Efren 10, 2023   1308 EKG shows normal sinus rhythm at 70 bpm no signs of ST changes no ST elevation. QTc 412. No change in prior EKG. EKG inter by myself. [RR]   8166 OJXZJHJX all results and reevaluate patient. She is in no distress at this time. Speech is clear. Discussed to stop taking her Macrobid and follow-up with her PCP and neurologist for further evaluation and treatment. She agrees with this plan feels comfortable for discharge home. [RS]      ED Course User Index  [KK] Bruno Garcia MD  [RS] Corrie Taylor, APRN - CNP     PROCEDURES   none      CONSULTS:   None    DIFFERENTIAL DX / MDM   MDM:   Social Determinants : None    Records Reviewed : None_ n/a per encounter visit    CC/HPI Summary, DDx, ED Course, and Reassessment: Patient presents with Medication Reaction (started new medication on thurs, possible adverse reaction, nausea, diarrhea.)  Patient has multiple complaints saying that when she started Macrobid she started having abdominal pain nausea, diarrhea, dizziness, shortness of breath, feet and hand numbness and tingling. She has a history of chronic UTIs and was placed on Macrobid after finishing Omnicef.   Chronic conditions    Past Medical History:   Diagnosis Date    Acid reflux     Allergic rhinitis     Asthma     ALLERY INDUCED

## 2023-07-11 ENCOUNTER — HOSPITAL ENCOUNTER (OUTPATIENT)
Dept: ULTRASOUND IMAGING | Age: 68
Discharge: HOME | End: 2023-07-13
Payer: MEDICARE

## 2023-07-11 DIAGNOSIS — E04.2 MULTINODULAR GOITER: ICD-10-CM

## 2023-07-11 PROCEDURE — 76536 US EXAM OF HEAD AND NECK: CPT

## 2023-07-23 ENCOUNTER — TELEPHONE (OUTPATIENT)
Dept: ENDOCRINOLOGY | Age: 68
End: 2023-07-23

## 2023-09-07 ENCOUNTER — HOSPITAL ENCOUNTER (EMERGENCY)
Age: 68
Discharge: HOME | End: 2023-09-07
Attending: EMERGENCY MEDICINE
Payer: MEDICARE

## 2023-09-07 ENCOUNTER — APPOINTMENT (OUTPATIENT)
Dept: CT IMAGING | Age: 68
End: 2023-09-07
Payer: MEDICARE

## 2023-09-07 VITALS
RESPIRATION RATE: 16 BRPM | WEIGHT: 150 LBS | BODY MASS INDEX: 24.21 KG/M2 | DIASTOLIC BLOOD PRESSURE: 74 MMHG | TEMPERATURE: 97.9 F | HEART RATE: 79 BPM | OXYGEN SATURATION: 100 % | SYSTOLIC BLOOD PRESSURE: 142 MMHG

## 2023-09-07 DIAGNOSIS — R10.84 GENERALIZED ABDOMINAL PAIN: Primary | ICD-10-CM

## 2023-09-07 LAB
ALBUMIN SERPL-MCNC: 4.5 G/DL (ref 3.5–5.2)
ALP SERPL-CCNC: 98 U/L (ref 35–104)
ALT SERPL-CCNC: 21 U/L (ref 0–32)
ANION GAP SERPL CALCULATED.3IONS-SCNC: 8 MMOL/L (ref 7–16)
AST SERPL-CCNC: 42 U/L (ref 0–31)
BACTERIA URNS QL MICRO: ABNORMAL
BILIRUB SERPL-MCNC: 0.4 MG/DL (ref 0–1.2)
BILIRUB UR QL STRIP: NEGATIVE
BUN SERPL-MCNC: 13 MG/DL (ref 6–23)
CALCIUM SERPL-MCNC: 9.5 MG/DL (ref 8.6–10.2)
CHLORIDE SERPL-SCNC: 102 MMOL/L (ref 98–107)
CLARITY UR: ABNORMAL
CO2 SERPL-SCNC: 25 MMOL/L (ref 22–29)
COLOR UR: YELLOW
CREAT SERPL-MCNC: 0.6 MG/DL (ref 0.5–1)
EPI CELLS #/AREA URNS HPF: ABNORMAL /HPF
ERYTHROCYTE [DISTWIDTH] IN BLOOD BY AUTOMATED COUNT: 12.9 % (ref 11.5–15)
GFR SERPL CREATININE-BSD FRML MDRD: >60 ML/MIN/1.73M2
GLUCOSE SERPL-MCNC: 91 MG/DL (ref 74–99)
GLUCOSE UR STRIP-MCNC: NEGATIVE MG/DL
HCT VFR BLD AUTO: 44.2 % (ref 34–48)
HGB BLD-MCNC: 14.7 G/DL (ref 11.5–15.5)
HGB UR QL STRIP.AUTO: NEGATIVE
KETONES UR STRIP-MCNC: NEGATIVE MG/DL
LACTATE BLDV-SCNC: 1.2 MMOL/L (ref 0.5–2.2)
LEUKOCYTE ESTERASE UR QL STRIP: NEGATIVE
MCH RBC QN AUTO: 29.9 PG (ref 26–35)
MCHC RBC AUTO-ENTMCNC: 33.3 G/DL (ref 32–34.5)
MCV RBC AUTO: 90 FL (ref 80–99.9)
NITRITE UR QL STRIP: NEGATIVE
PH UR STRIP: 6 [PH] (ref 5–9)
PLATELET # BLD AUTO: 255 K/UL (ref 130–450)
PMV BLD AUTO: 9.8 FL (ref 7–12)
POTASSIUM SERPL-SCNC: 6.4 MMOL/L (ref 3.5–5)
PROT SERPL-MCNC: 7.9 G/DL (ref 6.4–8.3)
PROT UR STRIP-MCNC: NEGATIVE MG/DL
RBC # BLD AUTO: 4.91 M/UL (ref 3.5–5.5)
RBC #/AREA URNS HPF: ABNORMAL /HPF
SODIUM SERPL-SCNC: 135 MMOL/L (ref 132–146)
SP GR UR STRIP: <1.005 (ref 1–1.03)
UROBILINOGEN UR STRIP-ACNC: 0.2 EU/DL (ref 0–1)
WBC #/AREA URNS HPF: ABNORMAL /HPF
WBC OTHER # BLD: 7.2 K/UL (ref 4.5–11.5)

## 2023-09-07 PROCEDURE — 6360000002 HC RX W HCPCS: Performed by: EMERGENCY MEDICINE

## 2023-09-07 PROCEDURE — 80053 COMPREHEN METABOLIC PANEL: CPT

## 2023-09-07 PROCEDURE — 85027 COMPLETE CBC AUTOMATED: CPT

## 2023-09-07 PROCEDURE — 83605 ASSAY OF LACTIC ACID: CPT

## 2023-09-07 PROCEDURE — 2580000003 HC RX 258: Performed by: EMERGENCY MEDICINE

## 2023-09-07 PROCEDURE — 81001 URINALYSIS AUTO W/SCOPE: CPT

## 2023-09-07 PROCEDURE — 99284 EMERGENCY DEPT VISIT MOD MDM: CPT

## 2023-09-07 PROCEDURE — 96374 THER/PROPH/DIAG INJ IV PUSH: CPT

## 2023-09-07 PROCEDURE — 74176 CT ABD & PELVIS W/O CONTRAST: CPT

## 2023-09-07 RX ORDER — KETOROLAC TROMETHAMINE 30 MG/ML
30 INJECTION, SOLUTION INTRAMUSCULAR; INTRAVENOUS ONCE
Status: COMPLETED | OUTPATIENT
Start: 2023-09-07 | End: 2023-09-07

## 2023-09-07 RX ORDER — 0.9 % SODIUM CHLORIDE 0.9 %
1000 INTRAVENOUS SOLUTION INTRAVENOUS ONCE
Status: COMPLETED | OUTPATIENT
Start: 2023-09-07 | End: 2023-09-07

## 2023-09-07 RX ADMIN — KETOROLAC TROMETHAMINE 30 MG: 30 INJECTION, SOLUTION INTRAMUSCULAR; INTRAVENOUS at 13:47

## 2023-09-07 RX ADMIN — SODIUM CHLORIDE 1000 ML: 9 INJECTION, SOLUTION INTRAVENOUS at 13:46

## 2023-09-07 ASSESSMENT — PAIN DESCRIPTION - DESCRIPTORS: DESCRIPTORS: BURNING

## 2023-09-07 ASSESSMENT — PAIN SCALES - GENERAL
PAINLEVEL_OUTOF10: 8
PAINLEVEL_OUTOF10: 7

## 2023-09-07 ASSESSMENT — PAIN DESCRIPTION - ORIENTATION: ORIENTATION: LOWER

## 2023-09-07 ASSESSMENT — PAIN - FUNCTIONAL ASSESSMENT: PAIN_FUNCTIONAL_ASSESSMENT: 0-10

## 2023-09-07 ASSESSMENT — PAIN DESCRIPTION - LOCATION: LOCATION: ABDOMEN

## 2023-09-07 NOTE — ED PROVIDER NOTES
HPI:  9/7/23,   Time: 1:42 PM EDT         Alfa Moran is a 69 y.o. female presenting to the ED for evaluation of diarrhea and left lower quadrant abdominal pain. Patient states evaluation of diarrhea and left lower quadrant abdominal pain. Patient states approxione and 1/2 weeks ago she started using a vaginal hormonal cream which caused a diffuse outbreak. She then developed a urinary tract infection. She took antibiotics for approximately 5 days and she finished them yesterday. She developed left lower quadrant abdominal pain and diarrhea over the past couple of days. Pain is worse with movement. She has minimal to no nausea. Denies fevers or chills. She has a history of diverticulitis in the past but states this feels differently. Denies hematochezia or melena. .  Patient denies headache, sore throat, neck pain, back pain, chest pain, shortness of breath, cough, hemoptysis, extremity numbness or weakness or rash. ROS:   Pertinent positives and negatives are stated within HPI, all other systems reviewed and are negative.  --------------------------------------------- PAST HISTORY ---------------------------------------------  Past Medical History:  has a past medical history of Acid reflux, Allergic rhinitis, Asthma, Diverticulosis, Irritable bowel, Pinched nerve in neck, Staph aureus infection, and UTI (urinary tract infection). Past Surgical History:  has a past surgical history that includes hernia repair; Dental surgery; Tubal ligation; cyst removal; Tonsillectomy; Breast surgery; Nasal fracture surgery (1975); Nose surgery (1976); Cholecystectomy (07/14/2015); and Mandible surgery. Social History:  reports that she quit smoking about 6 years ago. Her smoking use included cigarettes. She has a 10.00 pack-year smoking history. She has never used smokeless tobacco. She reports current alcohol use. She reports that she does not use drugs.     Family History: family history includes Alzheimer's Normal bowel sounds are present. No CVA tenderness. Back:  No tenderness to palpation on the cervical or thoracic or lumbar spine  Extremities: Moves all extremities x 4. Warm and well perfused  Skin: warm and dry without rash  Neurologic: GCS 15, no focal acute neurological deficit  Psych: Normal Affect      ------------------------------ ED COURSE/MEDICAL DECISION MAKING----------------------  Medications   sodium chloride 0.9 % bolus 1,000 mL (1,000 mLs IntraVENous New Bag 9/7/23 1346)   ketorolac (TORADOL) injection 30 mg (30 mg IntraVENous Given 9/7/23 1347)         Medical Decision Making:    Patient's laboratory and imaging unremarkable. She describes as burning pain and states she is concerned that maybe she has an internal yeast infection. I told her I see no signs or symptoms suggestive of that however she states she when she had pain like this in the past she took nystatin for a few days and resolved. I am going to try this. She will be discharged home with instructions to return should her symptoms worsen. Patient advised to contact their primary care physician either today or within the next 24 hours to secure a follow-up appointment and to return to the emergency Department immediately should their symptoms recur or worsen. --------------------------------- IMPRESSION AND DISPOSITION ---------------------------------    IMPRESSION  1.  Generalized abdominal pain        DISPOSITION  Disposition: Discharge to home  Patient condition is good          Phuc Orozco DO  09/07/23 9866

## 2023-10-30 PROBLEM — B37.9 CANDIDIASIS, UNSPECIFIED: Status: ACTIVE | Noted: 2023-09-13

## 2023-11-21 ENCOUNTER — HOSPITAL ENCOUNTER (OUTPATIENT)
Age: 68
Discharge: HOME | End: 2023-11-21
Payer: MEDICARE

## 2023-11-21 LAB
25(OH)D3 SERPL-MCNC: 42.4 NG/ML (ref 30–100)
ALBUMIN SERPL-MCNC: 4.5 G/DL (ref 3.5–5.2)
ALP SERPL-CCNC: 95 U/L (ref 35–104)
ALT SERPL-CCNC: 19 U/L (ref 0–32)
ANION GAP SERPL CALCULATED.3IONS-SCNC: 10 MMOL/L (ref 7–16)
AST SERPL-CCNC: 22 U/L (ref 0–31)
BACTERIA URNS QL MICRO: ABNORMAL
BASOPHILS # BLD: 0.06 K/UL (ref 0–0.2)
BASOPHILS NFR BLD: 1 % (ref 0–2)
BILIRUB SERPL-MCNC: 0.5 MG/DL (ref 0–1.2)
BILIRUB UR QL STRIP: NEGATIVE
BUN SERPL-MCNC: 20 MG/DL (ref 6–23)
CALCIUM SERPL-MCNC: 9.8 MG/DL (ref 8.6–10.2)
CHLORIDE SERPL-SCNC: 102 MMOL/L (ref 98–107)
CHOLEST SERPL-MCNC: 238 MG/DL
CLARITY UR: ABNORMAL
CO2 SERPL-SCNC: 26 MMOL/L (ref 22–29)
COLOR UR: YELLOW
CREAT SERPL-MCNC: 0.9 MG/DL (ref 0.5–1)
EOSINOPHIL # BLD: 0.1 K/UL (ref 0.05–0.5)
EOSINOPHILS RELATIVE PERCENT: 2 % (ref 0–6)
EPI CELLS #/AREA URNS HPF: ABNORMAL /HPF
ERYTHROCYTE [DISTWIDTH] IN BLOOD BY AUTOMATED COUNT: 12.5 % (ref 11.5–15)
GFR SERPL CREATININE-BSD FRML MDRD: >60 ML/MIN/1.73M2
GLUCOSE SERPL-MCNC: 95 MG/DL (ref 74–99)
GLUCOSE UR STRIP-MCNC: NEGATIVE MG/DL
HCT VFR BLD AUTO: 45.8 % (ref 34–48)
HDLC SERPL-MCNC: 84 MG/DL
HGB BLD-MCNC: 15.1 G/DL (ref 11.5–15.5)
HGB UR QL STRIP.AUTO: ABNORMAL
IMM GRANULOCYTES # BLD AUTO: <0.03 K/UL (ref 0–0.58)
IMM GRANULOCYTES NFR BLD: 0 % (ref 0–5)
KETONES UR STRIP-MCNC: NEGATIVE MG/DL
LDLC SERPL CALC-MCNC: 138 MG/DL
LEUKOCYTE ESTERASE UR QL STRIP: ABNORMAL
LYMPHOCYTES NFR BLD: 1.58 K/UL (ref 1.5–4)
LYMPHOCYTES RELATIVE PERCENT: 25 % (ref 20–42)
MCH RBC QN AUTO: 29.8 PG (ref 26–35)
MCHC RBC AUTO-ENTMCNC: 33 G/DL (ref 32–34.5)
MCV RBC AUTO: 90.3 FL (ref 80–99.9)
MONOCYTES NFR BLD: 0.5 K/UL (ref 0.1–0.95)
MONOCYTES NFR BLD: 8 % (ref 2–12)
NEUTROPHILS NFR BLD: 65 % (ref 43–80)
NEUTS SEG NFR BLD: 4.1 K/UL (ref 1.8–7.3)
NITRITE UR QL STRIP: NEGATIVE
PH UR STRIP: 6 [PH] (ref 5–9)
PLATELET # BLD AUTO: 226 K/UL (ref 130–450)
PMV BLD AUTO: 9.7 FL (ref 7–12)
POTASSIUM SERPL-SCNC: 5 MMOL/L (ref 3.5–5)
PROT SERPL-MCNC: 7.6 G/DL (ref 6.4–8.3)
PROT UR STRIP-MCNC: NEGATIVE MG/DL
RBC # BLD AUTO: 5.07 M/UL (ref 3.5–5.5)
RBC #/AREA URNS HPF: ABNORMAL /HPF
SODIUM SERPL-SCNC: 138 MMOL/L (ref 132–146)
SP GR UR STRIP: 1.02 (ref 1–1.03)
TRIGL SERPL-MCNC: 81 MG/DL
UROBILINOGEN UR STRIP-ACNC: 0.2 EU/DL (ref 0–1)
VLDLC SERPL CALC-MCNC: 16 MG/DL
WBC #/AREA URNS HPF: ABNORMAL /HPF
WBC OTHER # BLD: 6.4 K/UL (ref 4.5–11.5)

## 2023-11-21 PROCEDURE — 36415 COLL VENOUS BLD VENIPUNCTURE: CPT

## 2023-11-21 PROCEDURE — 80061 LIPID PANEL: CPT

## 2023-11-21 PROCEDURE — 82306 VITAMIN D 25 HYDROXY: CPT

## 2023-11-21 PROCEDURE — 80053 COMPREHEN METABOLIC PANEL: CPT

## 2023-11-21 PROCEDURE — 85025 COMPLETE CBC W/AUTO DIFF WBC: CPT

## 2023-11-21 PROCEDURE — 81001 URINALYSIS AUTO W/SCOPE: CPT

## 2024-01-09 ENCOUNTER — OFFICE VISIT (OUTPATIENT)
Dept: ENDOCRINOLOGY | Age: 69
End: 2024-01-09
Payer: MEDICARE

## 2024-01-09 VITALS
SYSTOLIC BLOOD PRESSURE: 118 MMHG | BODY MASS INDEX: 23.54 KG/M2 | HEIGHT: 67 IN | WEIGHT: 150 LBS | OXYGEN SATURATION: 98 % | DIASTOLIC BLOOD PRESSURE: 86 MMHG | HEART RATE: 71 BPM

## 2024-01-09 DIAGNOSIS — E04.2 MULTINODULAR GOITER: Primary | ICD-10-CM

## 2024-01-09 DIAGNOSIS — E55.9 VITAMIN D DEFICIENCY: ICD-10-CM

## 2024-01-09 DIAGNOSIS — M81.0 OSTEOPOROSIS, UNSPECIFIED OSTEOPOROSIS TYPE, UNSPECIFIED PATHOLOGICAL FRACTURE PRESENCE: ICD-10-CM

## 2024-01-09 PROCEDURE — 99214 OFFICE O/P EST MOD 30 MIN: CPT | Performed by: INTERNAL MEDICINE

## 2024-01-09 PROCEDURE — 1123F ACP DISCUSS/DSCN MKR DOCD: CPT | Performed by: INTERNAL MEDICINE

## 2024-01-09 PROCEDURE — 1090F PRES/ABSN URINE INCON ASSESS: CPT | Performed by: INTERNAL MEDICINE

## 2024-01-09 PROCEDURE — 3017F COLORECTAL CA SCREEN DOC REV: CPT | Performed by: INTERNAL MEDICINE

## 2024-01-09 PROCEDURE — G8420 CALC BMI NORM PARAMETERS: HCPCS | Performed by: INTERNAL MEDICINE

## 2024-01-09 PROCEDURE — 1036F TOBACCO NON-USER: CPT | Performed by: INTERNAL MEDICINE

## 2024-01-09 PROCEDURE — G8427 DOCREV CUR MEDS BY ELIG CLIN: HCPCS | Performed by: INTERNAL MEDICINE

## 2024-01-09 PROCEDURE — G8399 PT W/DXA RESULTS DOCUMENT: HCPCS | Performed by: INTERNAL MEDICINE

## 2024-01-09 PROCEDURE — G8484 FLU IMMUNIZE NO ADMIN: HCPCS | Performed by: INTERNAL MEDICINE

## 2024-01-09 NOTE — PROGRESS NOTES
[facility] Department of Endocrinology Diabetes and Metabolism   [address] [place] [address]   Phone: [phone]  Fax: [phone]    Date of Service: 1/9/2024  Primary Care Physician: Crow Mckenzie MD  Provider: Jose Juan Salazar MD            Reason for the visit:  Multinodular goiter     History of Present Illness:  The history is provided by the patient. No  was used. Accuracy of the patient data is excellent.  Marnie Fermin is a very pleasant 68 y.o. female seen today for evaluation and management of multinodular goiter     The patient was found to have enlarged thyroid on a routine physical examination 4-5 years   Thyroid US 7/2020   Rt lobe measures 4.3 x 1 x 1.5 cm --> 5 mm nodule   Lt lobe measures 3.1 x 0.8 x 1.3 cm --> 4 mm cystic nodule   Isthmus 3 mm --> no nodules     US 2/2022:  Right thyroid lobe:  4.0 x 0.9 x 1.3 cm  Left thyroid lobe:  4.1 x 0.8 x 1.3 cm  Isthmus:  2 mm  Thyroid gland demonstrates normal echotexture and vascularity.  Nodules: There are few small nodules in the bilateral thyroid lobes.  In the right mid to upper pole there is a 6 mm slightly hypoechoic TR 4 nodule. Left lower pole 4 mm hypoechoic TR 4 nodule.     Cervical lymphadenopathy: No abnormal lymph nodes in the imaged portions of  the neck.    Last ultrasound from July 2023 showed a stable small multinodular goiter.    Marnie Fermin denies any new lumps, bumps in her neck, voice change, or shortness of breath. No family history of thyroid cancer. No prior history of radiation to head or neck region.  Lab Results   Component Value Date/Time    TSH 1.580 01/05/2023 10:17 AM    T4FREE 1.28 01/05/2023 10:17 AM    FT3 2.7 07/07/2020 12:00 AM    TSI <0.10 05/25/2021 08:45 AM    TPOABS 2.2 05/25/2021 08:45 AM    THGAB <0.9 05/25/2021 08:45 AM       On multivitamin daily     PAST MEDICAL HISTORY   Past Medical History:   Diagnosis Date    Acid reflux     Allergic rhinitis

## 2024-02-14 ENCOUNTER — APPOINTMENT (OUTPATIENT)
Dept: CT IMAGING | Age: 69
End: 2024-02-14
Payer: MEDICARE

## 2024-02-14 ENCOUNTER — HOSPITAL ENCOUNTER (EMERGENCY)
Age: 69
Discharge: HOME | End: 2024-02-14
Attending: EMERGENCY MEDICINE
Payer: MEDICARE

## 2024-02-14 VITALS
OXYGEN SATURATION: 98 % | HEART RATE: 66 BPM | HEIGHT: 67 IN | SYSTOLIC BLOOD PRESSURE: 171 MMHG | WEIGHT: 150 LBS | TEMPERATURE: 98.1 F | RESPIRATION RATE: 18 BRPM | BODY MASS INDEX: 23.54 KG/M2 | DIASTOLIC BLOOD PRESSURE: 78 MMHG

## 2024-02-14 DIAGNOSIS — E83.52 SERUM CALCIUM ELEVATED: ICD-10-CM

## 2024-02-14 DIAGNOSIS — K57.92 ACUTE DIVERTICULITIS: Primary | ICD-10-CM

## 2024-02-14 DIAGNOSIS — K44.9 HIATAL HERNIA: ICD-10-CM

## 2024-02-14 LAB
ALBUMIN SERPL-MCNC: 4.8 G/DL (ref 3.5–5.2)
ALP SERPL-CCNC: 138 U/L (ref 35–104)
ALT SERPL-CCNC: 21 U/L (ref 0–32)
ANION GAP SERPL CALCULATED.3IONS-SCNC: 12 MMOL/L (ref 7–16)
AST SERPL-CCNC: 22 U/L (ref 0–31)
BASOPHILS # BLD: 0.05 K/UL (ref 0–0.2)
BASOPHILS NFR BLD: 1 % (ref 0–2)
BILIRUB SERPL-MCNC: 0.4 MG/DL (ref 0–1.2)
BILIRUB UR QL STRIP: NEGATIVE
BUN SERPL-MCNC: 12 MG/DL (ref 6–23)
CALCIUM SERPL-MCNC: 10.5 MG/DL (ref 8.6–10.2)
CHLORIDE SERPL-SCNC: 96 MMOL/L (ref 98–107)
CLARITY UR: CLEAR
CO2 SERPL-SCNC: 26 MMOL/L (ref 22–29)
COLOR UR: YELLOW
CREAT SERPL-MCNC: 0.7 MG/DL (ref 0.5–1)
D DIMER: 218 NG/ML DDU (ref 0–232)
EOSINOPHIL # BLD: 0.07 K/UL (ref 0.05–0.5)
EOSINOPHILS RELATIVE PERCENT: 1 % (ref 0–6)
ERYTHROCYTE [DISTWIDTH] IN BLOOD BY AUTOMATED COUNT: 12.6 % (ref 11.5–15)
GFR SERPL CREATININE-BSD FRML MDRD: >60 ML/MIN/1.73M2
GLUCOSE SERPL-MCNC: 92 MG/DL (ref 74–99)
GLUCOSE UR STRIP-MCNC: NEGATIVE MG/DL
HCT VFR BLD AUTO: 48.1 % (ref 34–48)
HGB BLD-MCNC: 15.6 G/DL (ref 11.5–15.5)
HGB UR QL STRIP.AUTO: NEGATIVE
IMM GRANULOCYTES # BLD AUTO: 0.03 K/UL (ref 0–0.58)
IMM GRANULOCYTES NFR BLD: 0 % (ref 0–5)
KETONES UR STRIP-MCNC: NEGATIVE MG/DL
LACTATE BLDV-SCNC: 1.2 MMOL/L (ref 0.5–2.2)
LEUKOCYTE ESTERASE UR QL STRIP: ABNORMAL
LIPASE SERPL-CCNC: 20 U/L (ref 13–60)
LYMPHOCYTES NFR BLD: 1.78 K/UL (ref 1.5–4)
LYMPHOCYTES RELATIVE PERCENT: 20 % (ref 20–42)
MCH RBC QN AUTO: 29.3 PG (ref 26–35)
MCHC RBC AUTO-ENTMCNC: 32.4 G/DL (ref 32–34.5)
MCV RBC AUTO: 90.4 FL (ref 80–99.9)
MONOCYTES NFR BLD: 0.5 K/UL (ref 0.1–0.95)
MONOCYTES NFR BLD: 6 % (ref 2–12)
NEUTROPHILS NFR BLD: 72 % (ref 43–80)
NEUTS SEG NFR BLD: 6.39 K/UL (ref 1.8–7.3)
NITRITE UR QL STRIP: NEGATIVE
PH UR STRIP: 6 [PH] (ref 5–9)
PLATELET # BLD AUTO: 274 K/UL (ref 130–450)
PMV BLD AUTO: 9.8 FL (ref 7–12)
POTASSIUM SERPL-SCNC: 4.3 MMOL/L (ref 3.5–5)
PROT SERPL-MCNC: 8.6 G/DL (ref 6.4–8.3)
PROT UR STRIP-MCNC: NEGATIVE MG/DL
RBC # BLD AUTO: 5.32 M/UL (ref 3.5–5.5)
RBC #/AREA URNS HPF: ABNORMAL /HPF
SODIUM SERPL-SCNC: 134 MMOL/L (ref 132–146)
SP GR UR STRIP: 1.01 (ref 1–1.03)
UROBILINOGEN UR STRIP-ACNC: 0.2 EU/DL (ref 0–1)
WBC #/AREA URNS HPF: ABNORMAL /HPF
WBC OTHER # BLD: 8.8 K/UL (ref 4.5–11.5)

## 2024-02-14 PROCEDURE — 85025 COMPLETE CBC W/AUTO DIFF WBC: CPT

## 2024-02-14 PROCEDURE — 71275 CT ANGIOGRAPHY CHEST: CPT

## 2024-02-14 PROCEDURE — 74177 CT ABD & PELVIS W/CONTRAST: CPT

## 2024-02-14 PROCEDURE — 96374 THER/PROPH/DIAG INJ IV PUSH: CPT

## 2024-02-14 PROCEDURE — 2580000003 HC RX 258: Performed by: EMERGENCY MEDICINE

## 2024-02-14 PROCEDURE — 6360000002 HC RX W HCPCS: Performed by: EMERGENCY MEDICINE

## 2024-02-14 PROCEDURE — 81001 URINALYSIS AUTO W/SCOPE: CPT

## 2024-02-14 PROCEDURE — 83605 ASSAY OF LACTIC ACID: CPT

## 2024-02-14 PROCEDURE — 6370000000 HC RX 637 (ALT 250 FOR IP): Performed by: EMERGENCY MEDICINE

## 2024-02-14 PROCEDURE — 80053 COMPREHEN METABOLIC PANEL: CPT

## 2024-02-14 PROCEDURE — 85379 FIBRIN DEGRADATION QUANT: CPT

## 2024-02-14 PROCEDURE — 83690 ASSAY OF LIPASE: CPT

## 2024-02-14 PROCEDURE — 99285 EMERGENCY DEPT VISIT HI MDM: CPT

## 2024-02-14 PROCEDURE — 6360000004 HC RX CONTRAST MEDICATION: Performed by: RADIOLOGY

## 2024-02-14 RX ORDER — ONDANSETRON 4 MG/1
4 TABLET, FILM COATED ORAL EVERY 8 HOURS PRN
Qty: 20 TABLET | Refills: 0 | Status: SHIPPED | OUTPATIENT
Start: 2024-02-14

## 2024-02-14 RX ORDER — KETOROLAC TROMETHAMINE 30 MG/ML
15 INJECTION, SOLUTION INTRAMUSCULAR; INTRAVENOUS ONCE
Status: COMPLETED | OUTPATIENT
Start: 2024-02-14 | End: 2024-02-14

## 2024-02-14 RX ORDER — METRONIDAZOLE 500 MG/1
500 TABLET ORAL 3 TIMES DAILY
Qty: 30 TABLET | Refills: 0 | Status: SHIPPED | OUTPATIENT
Start: 2024-02-14 | End: 2024-02-24

## 2024-02-14 RX ORDER — METRONIDAZOLE 500 MG/1
500 TABLET ORAL ONCE
Status: COMPLETED | OUTPATIENT
Start: 2024-02-14 | End: 2024-02-14

## 2024-02-14 RX ORDER — CEFDINIR 300 MG/1
300 CAPSULE ORAL 2 TIMES DAILY
Qty: 20 CAPSULE | Refills: 0 | Status: SHIPPED | OUTPATIENT
Start: 2024-02-14 | End: 2024-02-24

## 2024-02-14 RX ORDER — CEFDINIR 300 MG/1
300 CAPSULE ORAL ONCE
Status: COMPLETED | OUTPATIENT
Start: 2024-02-14 | End: 2024-02-14

## 2024-02-14 RX ORDER — 0.9 % SODIUM CHLORIDE 0.9 %
1000 INTRAVENOUS SOLUTION INTRAVENOUS ONCE
Status: COMPLETED | OUTPATIENT
Start: 2024-02-14 | End: 2024-02-14

## 2024-02-14 RX ADMIN — METRONIDAZOLE 500 MG: 500 TABLET ORAL at 19:30

## 2024-02-14 RX ADMIN — CEFDINIR 300 MG: 300 CAPSULE ORAL at 19:30

## 2024-02-14 RX ADMIN — IOPAMIDOL 75 ML: 755 INJECTION, SOLUTION INTRAVENOUS at 17:16

## 2024-02-14 RX ADMIN — KETOROLAC TROMETHAMINE 15 MG: 30 INJECTION, SOLUTION INTRAMUSCULAR; INTRAVENOUS at 16:22

## 2024-02-14 RX ADMIN — SODIUM CHLORIDE 1000 ML: 9 INJECTION, SOLUTION INTRAVENOUS at 16:20

## 2024-02-14 NOTE — ED NOTES
The following labs were labeled with appropriate pt sticker and tubed to lab:     [x] Blue     [x] Lavender   [] on ice  [x] Green/yellow  [] Green/black [] on ice  [x] Grey  [x] on ice  [] Yellow  [] Red  [] Pink  [] Type/ Screen  [] ABG  [] VBG    [] COVID-19 swab    [] Rapid  [] PCR  [] Flu swab  [] Peds Viral Panel     [x] Urine Sample  [] Fecal Sample  [] Pelvic Cultures  [] Blood Cultures  [] X 2  [] STREP Cultures  [] Wound Cultures

## 2024-02-14 NOTE — ED PROVIDER NOTES
Department of Emergency Medicine  FIRST PROVIDER TRIAGE NOTE             Independent MLP           2/14/24  12:24 PM EST    Date of Encounter: 2/14/24   MRN: [id number]      HPI: Marnie Fermin is a 68 y.o. female who presents to the ED for Abdominal Pain (LUQ pain, hx of diverticulitis, nausea present)      ROS: Negative for back pain, fever, vomiting, diarrhea, or urinary complaints.    PE: Gen Appearance/Constitutional: alert  CV: regular rate  GI: tender to palpation     Initial Plan of Care: All treatment areas with department are currently occupied. Plan to order/Initiate the following while awaiting opening in ED:  Initiate Treatment-Testing, Proceed toTreatment Area When Bed Available for ED Attending/MLP to Continue Care    Electronically signed by ROCÍO Carbajal CNP   DD: 2/14/24       Christina Prieto APRN - CNP  02/18/24 [id number]    
date: 1978     Quit date: 2016     Years since quittin.2    Smokeless tobacco: Never   Vaping Use    Vaping Use: Never used   Substance Use Topics    Alcohol use: Yes     Comment: SOCIAL    Drug use: No       SCREENINGS        Cecilio Coma Scale  Eye Opening: Spontaneous  Best Verbal Response: Oriented  Best Motor Response: Obeys commands  Cecilio Coma Scale Score: 15                CIWA Assessment  BP: (!) 171/78  Pulse: 66           PHYSICAL EXAM  1 or more Elements     ED Triage Vitals   BP Temp Temp Source Pulse Respirations SpO2 Height Weight - Scale   24 1224 24 1224 24 1224 24 1224 24 1224 24 1224 24 1208 24 1208   128/88 98.1 °F (36.7 °C) Oral 82 16 99 % 1.689 m (5' 6.5\") 68 kg (150 lb)       Physical Exam  Vitals reviewed.   Constitutional:       General: She is not in acute distress.     Appearance: Normal appearance. She is not toxic-appearing.   HENT:      Head: Normocephalic and atraumatic.      Right Ear: External ear normal.      Left Ear: External ear normal.      Nose: Nose normal. No congestion.      Mouth/Throat:      Mouth: Mucous membranes are moist.      Pharynx: Oropharynx is clear. No posterior oropharyngeal erythema.   Eyes:      Extraocular Movements: Extraocular movements intact.      Pupils: Pupils are equal, round, and reactive to light.   Cardiovascular:      Rate and Rhythm: Normal rate and regular rhythm.      Pulses: Normal pulses.      Heart sounds: No murmur heard.  Pulmonary:      Effort: Pulmonary effort is normal.      Breath sounds: No wheezing or rhonchi.   Chest:      Chest wall: Tenderness present.   Abdominal:      General: Abdomen is flat. Bowel sounds are normal.      Palpations: Abdomen is soft.      Tenderness: There is abdominal tenderness in the left upper quadrant and left lower quadrant. There is no right CVA tenderness, left CVA tenderness or guarding.      Hernia: No hernia is present.

## 2024-02-15 NOTE — DISCHARGE INSTRUCTIONS
Call family doctor tomorrow and schedule a followup appointment to be seen in 2 days    Have your doctor obtain  finalized report of CT scan today    CT ABDOMEN PELVIS W IV CONTRAST Additional Contrast? None   Final Result      1.  Diffuse diverticulosis.  There may be some mild inflammatory change near   a thickened diverticulum involving the mid left/descending colon with some   thickening/edema of the adjacent lateral conal fascia which may suggest   mild/early acute diverticulitis though not definitive.  Otherwise nonspecific   bowel gas pattern.      2.  Small hiatal hernia.      3.  Otherwise no acute pathology or significant change noted.         CTA PULMONARY W CONTRAST   Final Result   1. No pulmonary embolism.   2. Clear lungs.   3. Small hiatal hernia.

## 2024-02-15 NOTE — DISCHARGE INSTR - COC
None to display            Nurse Assessment:  Last Vital Signs: BP (!) 171/78   Pulse 66   Temp 98.1 °F (36.7 °C) (Oral)   Resp 18   Ht 1.689 m (5' 6.5\")   Wt 68 kg (150 lb)   SpO2 98%   BMI 23.85 kg/m²     Last documented pain score (0-10 scale): Pain Level: 7  Last Weight:   Wt Readings from Last 1 Encounters:   24 68 kg (150 lb)     Mental Status:  {IP PT MENTAL STATUS:}    IV Access:  { ANNETTA IV ACCESS:212558738}    Nursing Mobility/ADLs:  Walking   {CHP DME ADLs:796917899}  Transfer  {CHP DME ADLs:044503959}  Bathing  {CHP DME ADLs:291494699}  Dressing  {CHP DME ADLs:935971484}  Toileting  {CHP DME ADLs:075610303}  Feeding  {CHP DME ADLs:751246316}  Med Admin  {P DME ADLs:562836119}  Med Delivery   { ANNETTA MED Delivery:752063153}    Wound Care Documentation and Therapy:        Elimination:  Continence:   Bowel: {YES / NO:}  Bladder: {YES / NO:}  Urinary Catheter: {Urinary Catheter:014479174}   Colostomy/Ileostomy/Ileal Conduit: {YES / NO:}       Date of Last BM: ***  No intake or output data in the 24 hours ending 24  No intake/output data recorded.    Safety Concerns:     { ANNETTA Safety Concerns:426459204}    Impairments/Disabilities:      { ANNETTA Impairments/Disabilities:204451070}    Nutrition Therapy:  Current Nutrition Therapy:   { ANNETTA Diet List:541650627}    Routes of Feeding: {CHP DME Other Feedings:139151407}  Liquids: {Slp liquid thickness:18481}  Daily Fluid Restriction: {CHP DME Yes amt example:150628218}  Last Modified Barium Swallow with Video (Video Swallowing Test): {Done Not Done Date:}    Treatments at the Time of Hospital Discharge:   Respiratory Treatments: ***  Oxygen Therapy:  {Therapy; copd oxygen:58658}  Ventilator:    { CC Vent List:492444710}    Rehab Therapies: {THERAPEUTIC INTERVENTION:5722558262}  Weight Bearing Status/Restrictions: { CC Weight Bearin}  Other Medical Equipment (for information only, NOT a

## 2024-03-13 ENCOUNTER — APPOINTMENT (OUTPATIENT)
Dept: UROLOGY | Facility: HOSPITAL | Age: 69
End: 2024-03-13
Payer: MEDICARE

## 2024-06-20 DIAGNOSIS — E04.2 MULTINODULAR GOITER: Primary | ICD-10-CM

## 2024-08-01 ENCOUNTER — HOSPITAL ENCOUNTER (OUTPATIENT)
Dept: ULTRASOUND IMAGING | Age: 69
Discharge: HOME | End: 2024-08-03
Payer: MEDICARE

## 2024-08-01 DIAGNOSIS — E04.2 MULTINODULAR GOITER: ICD-10-CM

## 2024-08-01 PROCEDURE — 76536 US EXAM OF HEAD AND NECK: CPT

## 2024-08-05 ENCOUNTER — TELEPHONE (OUTPATIENT)
Dept: ENDOCRINOLOGY | Age: 69
End: 2024-08-05

## 2024-08-05 NOTE — TELEPHONE ENCOUNTER
----- Message from ROCÍO Carter - CNS sent at 8/5/2024  7:50 AM EDT -----  Please call patient and inform her I have reviewed thyroid ultrasound.  Thyroid nodules are stable when compared to previous.  This is an excellent result

## 2024-11-18 ENCOUNTER — HOSPITAL ENCOUNTER (OUTPATIENT)
Age: 69
Discharge: HOME | End: 2024-11-18
Payer: MEDICARE

## 2024-11-18 LAB
25(OH)D3 SERPL-MCNC: 32.9 NG/ML (ref 30–100)
ALBUMIN SERPL-MCNC: 4.4 G/DL (ref 3.5–5.2)
ALP SERPL-CCNC: 112 U/L (ref 35–104)
ALT SERPL-CCNC: 21 U/L (ref 0–32)
ANION GAP SERPL CALCULATED.3IONS-SCNC: 8 MMOL/L (ref 7–16)
AST SERPL-CCNC: 21 U/L (ref 0–31)
BASOPHILS # BLD: 0.06 K/UL (ref 0–0.2)
BASOPHILS NFR BLD: 1 % (ref 0–2)
BILIRUB SERPL-MCNC: 0.5 MG/DL (ref 0–1.2)
BILIRUB UR QL STRIP: NEGATIVE
BUN SERPL-MCNC: 11 MG/DL (ref 6–23)
CALCIUM SERPL-MCNC: 9.5 MG/DL (ref 8.6–10.2)
CHLORIDE SERPL-SCNC: 103 MMOL/L (ref 98–107)
CHOLEST SERPL-MCNC: 227 MG/DL
CLARITY UR: CLEAR
CO2 SERPL-SCNC: 27 MMOL/L (ref 22–29)
COLOR UR: YELLOW
CREAT SERPL-MCNC: 0.7 MG/DL (ref 0.5–1)
EOSINOPHIL # BLD: 0.08 K/UL (ref 0.05–0.5)
EOSINOPHILS RELATIVE PERCENT: 2 % (ref 0–6)
ERYTHROCYTE [DISTWIDTH] IN BLOOD BY AUTOMATED COUNT: 12.9 % (ref 11.5–15)
GFR, ESTIMATED: >90 ML/MIN/1.73M2
GLUCOSE SERPL-MCNC: 91 MG/DL (ref 74–99)
GLUCOSE UR STRIP-MCNC: NEGATIVE MG/DL
HCT VFR BLD AUTO: 42.5 % (ref 34–48)
HDLC SERPL-MCNC: 83 MG/DL
HGB BLD-MCNC: 14.1 G/DL (ref 11.5–15.5)
HGB UR QL STRIP.AUTO: ABNORMAL
IMM GRANULOCYTES # BLD AUTO: <0.03 K/UL (ref 0–0.58)
IMM GRANULOCYTES NFR BLD: 0 % (ref 0–5)
KETONES UR STRIP-MCNC: NEGATIVE MG/DL
LDLC SERPL CALC-MCNC: 126 MG/DL
LEUKOCYTE ESTERASE UR QL STRIP: ABNORMAL
LYMPHOCYTES NFR BLD: 1.46 K/UL (ref 1.5–4)
LYMPHOCYTES RELATIVE PERCENT: 30 % (ref 20–42)
MCH RBC QN AUTO: 30.3 PG (ref 26–35)
MCHC RBC AUTO-ENTMCNC: 33.2 G/DL (ref 32–34.5)
MCV RBC AUTO: 91.2 FL (ref 80–99.9)
MONOCYTES NFR BLD: 0.5 K/UL (ref 0.1–0.95)
MONOCYTES NFR BLD: 10 % (ref 2–12)
NEUTROPHILS NFR BLD: 57 % (ref 43–80)
NEUTS SEG NFR BLD: 2.84 K/UL (ref 1.8–7.3)
NITRITE UR QL STRIP: NEGATIVE
PH UR STRIP: 6 [PH] (ref 5–9)
PLATELET # BLD AUTO: 223 K/UL (ref 130–450)
PMV BLD AUTO: 9.3 FL (ref 7–12)
POTASSIUM SERPL-SCNC: 4.7 MMOL/L (ref 3.5–5)
PROT SERPL-MCNC: 7.1 G/DL (ref 6.4–8.3)
PROT UR STRIP-MCNC: NEGATIVE MG/DL
RBC # BLD AUTO: 4.66 M/UL (ref 3.5–5.5)
RBC #/AREA URNS HPF: ABNORMAL /HPF
SODIUM SERPL-SCNC: 138 MMOL/L (ref 132–146)
SP GR UR STRIP: 1.01 (ref 1–1.03)
TRIGL SERPL-MCNC: 88 MG/DL
UROBILINOGEN UR STRIP-ACNC: 0.2 EU/DL (ref 0–1)
VLDLC SERPL CALC-MCNC: 18 MG/DL
WBC #/AREA URNS HPF: ABNORMAL /HPF
WBC OTHER # BLD: 5 K/UL (ref 4.5–11.5)

## 2024-11-18 PROCEDURE — 82306 VITAMIN D 25 HYDROXY: CPT

## 2024-11-18 PROCEDURE — 81001 URINALYSIS AUTO W/SCOPE: CPT

## 2024-11-18 PROCEDURE — 80053 COMPREHEN METABOLIC PANEL: CPT

## 2024-11-18 PROCEDURE — 36415 COLL VENOUS BLD VENIPUNCTURE: CPT

## 2024-11-18 PROCEDURE — 85025 COMPLETE CBC W/AUTO DIFF WBC: CPT

## 2024-11-18 PROCEDURE — 80061 LIPID PANEL: CPT

## 2025-01-30 ENCOUNTER — DOCUMENTATION (OUTPATIENT)
Dept: GASTROENTEROLOGY | Facility: HOSPITAL | Age: 70
End: 2025-01-30
Payer: MEDICARE

## 2025-01-30 NOTE — PROGRESS NOTES
Dr. Rasheed Cooper's office received a referral for this patient from Dr. Cody Peralat requesting an EUS possible ERCP. Dr. Win Noriega reviewed the referral on 1/29/2025 and approved proceeding with scheduling the EUS.    The patient is currently scheduled for a clinic appointment with Dr. Cooper on 7/8/2025. Spoke with the patient via phone on 1/30/2025. The patient requested to keep her scheduled clinic appointment with Dr. Cooper to establish care and to be scheduled for an EUS beforehand. Patient is open to seeing one of Dr. Cooper's partners for an earlier procedure appointment.     Sophia Dickson was notified to call and schedule this patient.   Contact Fabi Chiang RN at [phone] for any questions.  Requested that the radiology imaging be uploaded to PACS on 1/30/2025.    See below for supporting clinical information from the referral sent by Dr. Peralta's office and from patient's medical records. Referral documents from the referring office were scanned under Media, dated 1/24/2025.     Requesting EUS +/- ERCP      CT abdomen pelvis w IV contrast  Order: [id number]  Impression        1.  Diffuse diverticulosis.  There may be some mild inflammatory change near  a thickened diverticulum involving the mid left/descending colon with some  thickening/edema of the adjacent lateral conal fascia which may suggest  mild/early acute diverticulitis though not definitive.  Otherwise nonspecific  bowel gas pattern.     2.  Small hiatal hernia.     3.  Otherwise no acute pathology or significant change noted.  Narrative        EXAM:     CT Abdomen and Pelvis With Intravenous Contrast     EXAM DATE/TIME:     2/14/2024 5:17 pm     CLINICAL HISTORY:     ORDERING SYSTEM PROVIDED HISTORY: llq pain, history of diverticulitis  TECHNOLOGIST PROVIDED HISTORY:  Additional Contrast?->None  Reason for  exam:->llq pain, history of diverticulitis  Decision Support Exception -  unselect if not a suspected or confirmed emergency  medical  condition->Emergency Medical Condition (MA)     TECHNIQUE:     Axial computed tomography images of the abdomen and pelvis with intravenous  contrast.  This CT exam was performed using one or more of the following dose  reduction techniques:  automated exposure control, adjustment of the mA  and/or kV according to patient size, and/or use of iterative reconstruction  technique.     COMPARISON:     06/10/2023     FINDINGS:     Lung bases:  No acute findings.  No mass.  No consolidation.     Mediastinum:  Small hiatal hernia.     ABDOMEN:     Liver:  No acute findings.  No mass.     Gallbladder and bile ducts:  Prior cholecystectomy.  No ductal dilation.     Pancreas:  No acute findings.  No mass.  No ductal dilation.     Spleen:  No acute findings.  No splenomegaly.     Adrenals:  No acute findings.  No mass.     Kidneys and ureters:  No acute findings.  No solid mass.  No hydronephrosis.     Stomach and bowel:  Diffuse diverticulosis.  There may be some mild  inflammatory change near a thickened diverticulum involving the mid  left/descending colon with some thickening/edema of the adjacent lateral  conal fascia which may suggest mild/early acute diverticulitis though not  definitive.  Otherwise nonspecific bowel gas pattern.  No obstruction.     PELVIS:     Appendix:  A normal appearing appendix is noted.     Bladder:  No acute findings.  No mass.     Reproductive:  Unremarkable as visualized.     ABDOMEN and PELVIS:     Intraperitoneal space:  No acute findings.  No free air.  No significant  fluid collection.     Bones/joints:  No acute fracture.  No dislocation.     Soft tissues:  No acute findings.     Vasculature:  No acute findings.  No abdominal aortic aneurysm.     Lymph nodes:  No acute findings.  No enlarged lymph nodes.  Exam End: 02/14/24 17:20      Specimen Collected: 02/14/24 18:03            Last Resulted: 02/14/24 18:05  Received From: [facility]           Result  Received: 25 11:43  View Encounter         MR abdomen wo/w con on 2024  MR abdomen wo/w con         [facility] [place] [address] [phone]     Magnetic Resonance Report  Signed     Patient: FRAN RUCKER MR#: [id number]  26  : 1955 Acct:[id number]     Age/Sex: 69 / F Admit Date: 24     Loc: [facility].MRI  Attending Dr: Devang Weeks MD     Ordering Physician: Devang Weeks MD  Date of Service: 24  Procedure(s): MR abdomen wo/w con  Accession Number(s): [id number]     cc: Theron Tafoya; Cody Peralta MD; Devang Weeks MD        HISTORY: Prominent pancreatic duct with possible mass.     PHYSICIAN INDICATIONS: K86.9     TECHNIQUE: 3T MRI. Multiplanar dynamic imaging without and with gadolinium.     FINDINGS:     Liver is homogeneous with no mass lesion seen.     Pancreas is normal in size. No mass lesion. Pancreatic duct is dilated measuring 4.3 mm.     Common bile duct measures 12 mm status post cholecystectomy with prominent intrahepatic biliary  tree.     Would recommend ERCP with endoscopic ultrasound to exclude ampullary neoplasm.     Kidneys, adrenal glands, and spleen are unremarkable.     No adenopathy is seen in the visualized retroperitoneum.     IMPRESSION:     3T MRI Pancreas without and with intravenous contrast:     1. Pancreas is normal in size. No mass lesion. Pancreatic duct is dilated measuring 4.3 mm.     2. Common bile duct measures 12 mm status post cholecystectomy with prominent intrahepatic biliary  tree.     Would recommend ERCP with endoscopic ultrasound to exclude ampullary neoplasm.                    Dictated By: Mack Radford MD DD/DT: 24  Signed By: Mack Radford MD 24     : DEBRA 24            [facility] ([place])                                                                                                                                                                                                                                                                                                                                                                                                                                                                                                                                                                                                                                                                                                                                                                                                                                                                                                                                                                                                                                                                                                                                                                             Magnetic resonance imaging report Ordered By: Mack Radford on 2024  Study report    [facility]   [place], [place] [id number] [phone]     Magnetic Resonance Report  Signed     Patient: FRAN RUCKER MR#: [id number]  [id number]  : 1955 Acct:[id number]     Age/Sex: 69 / F Admit Date:   Loc: [facility].MRI  Attending Dr: Devang Weeks MD     Ordering Physician: Devang Weeks MD  Date of Service: 24  Procedure(s): MR abdomen wo/w con  Accession Number(s): [id number]     cc: Theron Tafoya; Cody Peralta MD; Devang Weeks MD        HISTORY: Prominent pancreatic duct with possible mass.     PHYSICIAN INDICATIONS: K86.9     TECHNIQUE: 3T MRI. Multiplanar dynamic imaging without and with gadolinium.     FINDINGS:     Liver is homogeneous with no mass lesion seen.     Pancreas is normal in size. No mass lesion. Pancreatic duct is dilated measuring 4.3 mm.      Common bile duct measures 12 mm status post cholecystectomy with prominent intrahepatic biliary  tree.     Would recommend ERCP with endoscopic ultrasound to exclude ampullary neoplasm.     Kidneys, adrenal glands, and spleen are unremarkable.     No adenopathy is seen in the visualized retroperitoneum.     IMPRESSION:     3T MRI Pancreas without and with intravenous contrast:     1. Pancreas is normal in size. No mass lesion. Pancreatic duct is dilated measuring 4.3 mm.     2. Common bile duct measures 12 mm status post cholecystectomy with prominent intrahepatic biliary  tree.     Would recommend ERCP with endoscopic ultrasound to exclude ampullary neoplasm.                    Dictated By: Mack Radford MDDD/DT: 12/23/24 2019  Signed By: Mack Radford MD 12/23/24 2019     : DEBRA 12/23/24 2019                          [facility]  Work Phone: [phone]                                                                                                                                                                                                                                                                                                                                                                                                                                                                                                                                                                                                                                                                                                                                                                                                                                                                                                                                                                                                                                                                                                                                                                           CT urogram on 10-  CT urogram     [facility] [place] [address]  [phone]     CT Scan Report  Signed     Patient: FRAN RUCKER MR#: [id number]  26  : 1955 Acct:[id number]     Age/Sex: 69 / F Admit Date: 10/22/24     Loc: [facility]  Attending Dr: Devang Weeks MD     Ordering Physician: Devang Weeks MD  Date of Service: 10/22/24  Procedure(s): CT urogram  Accession Number(s): [id number]     cc: Devang Weeks MD        CLINICAL INDICATION: R 31.29. Hematuria.     TECHNIQUE: Unenhanced/enhanced axial imaging of the abdomen pelvis was performed utilizing CT  urographic technique (with 100 ml of Isovue 300 for contrast enhancement).     COMPARISON: 2017 enhanced abdominal/pelvic CT.     FINDINGS: Scattered small (less than 5 mm) pulmonary nodules are again identified at the lung  bases. No pneumoperitoneum. Suspect mild wall thickening of the distal esophagus. No gaseous  distension of stomach or small bowel. Moderate colonic diverticulosis (without acute  diverticulitis).     No focal hepatic or splenic masses. Stable subtle prominence of intrahepatic biliary radicles is  again noted. Status - post cholecystectomy. Subtle prominence of the pancreatic duct of head of  the pancreas (4.5 mm maximum transverse dimension). No peripancreatic inflammation. Bilateral  adrenal glands are within normal limits.     No radiopaque urinary tract calculi nor obstructive uropathy upon bilateral assessment. No renal  mass. Ureters are normal in course and caliber. Mild urinary bladder distension. Uterus measures  5 cm in length by 4.4 cm in transverse dimension.     No abdominal aortic aneurysm. Scattered subcentimeter retroperitoneal lymph nodes. Subcentimeter  bilateral external iliac chain lymph nodes incidentally identified. Fat-containing right inguinal  hernia defect  (without bowel entrapment).     Moderate degenerative disc disease and posterior element degenerative changes in mid - to - lower  lumbar spine creating relative spinal stenosis (with minimum AP dimension of the spinal canal less  than 5 mm at L3 - L4)     IMPRESSION:  1. No radiopaque urinary tract calculi nor obstructive uropathy. No renal mass.     2. Mild urinary bladder distension.     3. Subtle prominence of the pancreatic duct of head of the pancreas (4.5 mm maximum transverse  dimension). Subtle low density in the head of the pancreas. (Pancreatic MRI would allow for  further assessment).     4. Scattered small (less than 5 mm) pulmonary nodules again identified at the lung bases.     5. Mild wall thickening of the distal esophagus (finding for which esophagram is available for  further assessment).     6. Moderate colonic diverticulosis (without acute diverticulitis). (Lower endoscopy would allow  for health surveillance to exclude colonic neoplasia).                       Dictated By: Conrad Valentin MD DD/DT: 10/22/24 1938  Signed By: Conrad Valentin MD 10/22/24 1938     : MARCIA 10/22/24 1938                                                                                                                                                                                                                                                        Labs from 11/18/2024:  ·       Total Bilirubin: 0.5  ·       Alkaline Phosphatase: 112 H  ·       ALT: 21  ·       AST: 21

## 2025-02-03 ENCOUNTER — ANESTHESIA EVENT (OUTPATIENT)
Dept: GASTROENTEROLOGY | Facility: HOSPITAL | Age: 70
End: 2025-02-03
Payer: MEDICARE

## 2025-02-03 ENCOUNTER — ANESTHESIA (OUTPATIENT)
Dept: GASTROENTEROLOGY | Facility: HOSPITAL | Age: 70
End: 2025-02-03
Payer: MEDICARE

## 2025-02-03 ENCOUNTER — HOSPITAL ENCOUNTER (OUTPATIENT)
Dept: GASTROENTEROLOGY | Facility: HOSPITAL | Age: 70
Discharge: HOME | End: 2025-02-03
Payer: MEDICARE

## 2025-02-03 VITALS
OXYGEN SATURATION: 96 % | WEIGHT: 143.3 LBS | RESPIRATION RATE: 14 BRPM | HEART RATE: 57 BPM | HEIGHT: 66 IN | TEMPERATURE: 97.9 F | DIASTOLIC BLOOD PRESSURE: 59 MMHG | SYSTOLIC BLOOD PRESSURE: 98 MMHG | BODY MASS INDEX: 23.03 KG/M2

## 2025-02-03 DIAGNOSIS — R93.89 ABNORMAL FINDING ON IMAGING: ICD-10-CM

## 2025-02-03 DIAGNOSIS — Q45.3 ABNORMALITY OF PANCREATIC DUCT: ICD-10-CM

## 2025-02-03 PROBLEM — K21.9 GASTROESOPHAGEAL REFLUX DISEASE: Status: ACTIVE | Noted: 2025-02-03

## 2025-02-03 PROCEDURE — 7100000009 HC PHASE TWO TIME - INITIAL BASE CHARGE

## 2025-02-03 PROCEDURE — 7100000010 HC PHASE TWO TIME - EACH INCREMENTAL 1 MINUTE

## 2025-02-03 PROCEDURE — A43237 PR ESOPHAGOGASTRODUODENOSCOPY US SCOPE W/ADJ STRXRS: Performed by: NURSE ANESTHETIST, CERTIFIED REGISTERED

## 2025-02-03 PROCEDURE — 3700000001 HC GENERAL ANESTHESIA TIME - INITIAL BASE CHARGE

## 2025-02-03 PROCEDURE — 2500000004 HC RX 250 GENERAL PHARMACY W/ HCPCS (ALT 636 FOR OP/ED): Performed by: NURSE ANESTHETIST, CERTIFIED REGISTERED

## 2025-02-03 PROCEDURE — 43259 EGD US EXAM DUODENUM/JEJUNUM: CPT | Performed by: INTERNAL MEDICINE

## 2025-02-03 PROCEDURE — 3700000002 HC GENERAL ANESTHESIA TIME - EACH INCREMENTAL 1 MINUTE

## 2025-02-03 PROCEDURE — 43237 ENDOSCOPIC US EXAM ESOPH: CPT | Performed by: INTERNAL MEDICINE

## 2025-02-03 RX ORDER — MIDAZOLAM HYDROCHLORIDE 1 MG/ML
INJECTION INTRAMUSCULAR; INTRAVENOUS AS NEEDED
Status: DISCONTINUED | OUTPATIENT
Start: 2025-02-03 | End: 2025-02-03

## 2025-02-03 RX ORDER — SPIRONOLACTONE 50 MG/1
50 TABLET, FILM COATED ORAL DAILY
COMMUNITY

## 2025-02-03 RX ORDER — LORAZEPAM 1 MG/1
1 TABLET ORAL EVERY 6 HOURS PRN
COMMUNITY

## 2025-02-03 RX ORDER — HYDROGEN PEROXIDE 3 %
20 SOLUTION, NON-ORAL MISCELLANEOUS
COMMUNITY

## 2025-02-03 RX ORDER — DEXTROMETHORPHAN HYDROBROMIDE, GUAIFENESIN 5; 100 MG/5ML; MG/5ML
650 LIQUID ORAL EVERY 8 HOURS PRN
COMMUNITY

## 2025-02-03 RX ORDER — PROPOFOL 10 MG/ML
INJECTION, EMULSION INTRAVENOUS AS NEEDED
Status: DISCONTINUED | OUTPATIENT
Start: 2025-02-03 | End: 2025-02-03

## 2025-02-03 RX ORDER — HYDROCORTISONE 25 MG/G
2.5 CREAM TOPICAL 2 TIMES DAILY
COMMUNITY

## 2025-02-03 RX ORDER — LORATADINE 10 MG/1
10 TABLET ORAL DAILY
COMMUNITY

## 2025-02-03 RX ADMIN — PROPOFOL 30 MG: 10 INJECTION, EMULSION INTRAVENOUS at 10:53

## 2025-02-03 RX ADMIN — PROPOFOL 50 MG: 10 INJECTION, EMULSION INTRAVENOUS at 10:39

## 2025-02-03 RX ADMIN — PROPOFOL 30 MG: 10 INJECTION, EMULSION INTRAVENOUS at 10:48

## 2025-02-03 RX ADMIN — MIDAZOLAM HYDROCHLORIDE 2 MG: 1 INJECTION, SOLUTION INTRAMUSCULAR; INTRAVENOUS at 10:41

## 2025-02-03 SDOH — HEALTH STABILITY: MENTAL HEALTH: CURRENT SMOKER: 0

## 2025-02-03 ASSESSMENT — PAIN SCALES - GENERAL
PAINLEVEL_OUTOF10: 3
PAINLEVEL_OUTOF10: 0 - NO PAIN
PAINLEVEL_OUTOF10: 3
PAINLEVEL_OUTOF10: 0 - NO PAIN
PAINLEVEL_OUTOF10: 0 - NO PAIN
PAIN_LEVEL: 0
PAINLEVEL_OUTOF10: 0 - NO PAIN

## 2025-02-03 ASSESSMENT — COLUMBIA-SUICIDE SEVERITY RATING SCALE - C-SSRS
6. HAVE YOU EVER DONE ANYTHING, STARTED TO DO ANYTHING, OR PREPARED TO DO ANYTHING TO END YOUR LIFE?: NO
1. IN THE PAST MONTH, HAVE YOU WISHED YOU WERE DEAD OR WISHED YOU COULD GO TO SLEEP AND NOT WAKE UP?: NO
2. HAVE YOU ACTUALLY HAD ANY THOUGHTS OF KILLING YOURSELF?: NO

## 2025-02-03 ASSESSMENT — ENCOUNTER SYMPTOMS: CONSTITUTIONAL NEGATIVE: 1

## 2025-02-03 ASSESSMENT — PAIN - FUNCTIONAL ASSESSMENT
PAIN_FUNCTIONAL_ASSESSMENT: 0-10

## 2025-02-03 NOTE — DISCHARGE INSTRUCTIONS

## 2025-02-03 NOTE — ANESTHESIA PREPROCEDURE EVALUATION
Patient: Jing Andujar    Procedure Information       Date/Time: 02/03/25 0930    Scheduled providers: Rasheed Cooper DO; Sumeet Rodríguez MD; ADEEL Pineda-JOHNY; Iliana Weldon RN; Ella Lopez RN; Melisa Turcios, RN    Procedure: ENDOSCOPIC ULTRASOUND (UPPER)    Location: [facility]            Relevant Problems   Anesthesia (within normal limits)      Cardiac (within normal limits)      Pulmonary (within normal limits)      Neuro (within normal limits)      GI   (+) Gastroesophageal reflux disease      /Renal (within normal limits)      Liver (within normal limits)      Endocrine (within normal limits)      Hematology (within normal limits)      Musculoskeletal (within normal limits)      HEENT (within normal limits)      ID (within normal limits)      Skin (within normal limits)      GYN (within normal limits)       Clinical information reviewed:   Tobacco  Allergies  Meds   Med Hx  Surg Hx  OB Status  Fam Hx  Soc   Hx        NPO Detail:  NPO/Void Status  Carbohydrate Drink Given Prior to Surgery? : N  Date of Last Liquid: 02/03/25  Time of Last Liquid: 0530  Date of Last Solid: 02/02/25  Time of Last Solid: 2100  Last Intake Type: Clear fluids  Time of Last Void: 0800         Physical Exam    Airway  Mallampati: I  TM distance: <3 FB  Neck ROM: full     Cardiovascular - normal exam     Dental - normal exam     Pulmonary - normal exam     Abdominal - normal exam             Anesthesia Plan    History of general anesthesia?: yes  History of complications of general anesthesia?: no    ASA 2     general     The patient is not a current smoker.  Patient was not previously instructed to abstain from smoking on day of procedure.  Patient did not smoke on day of procedure.    intravenous induction   Anesthetic plan and risks discussed with patient.  Use of blood products discussed with patient who consented to blood products.

## 2025-02-03 NOTE — ANESTHESIA POSTPROCEDURE EVALUATION
Patient: Jing Andujar    Procedure Summary       Date: 02/03/25 Room / Location: [facility]    Anesthesia Start: 1029 Anesthesia Stop: 1102    Procedure: ENDOSCOPIC ULTRASOUND (UPPER) Diagnosis:       Abnormal finding on imaging      Abnormality of pancreatic duct    Scheduled Providers: Rasheed Cooper DO; Sumeet Rodríguez MD; ADEEL Pineda-JOHNY; Iliana Weldon RN; Ella Lopez RN; Melisa Turcios RN Responsible Provider: Sumeet Rodríguez MD    Anesthesia Type: general ASA Status: 2            Anesthesia Type: general    Vitals Value Taken Time   BP 98/59 02/03/25 1113   Temp 36.6 °C (97.9 °F) 02/03/25 1058   Pulse 63 02/03/25 1113   Resp 18 02/03/25 1113   SpO2 95 % 02/03/25 1113       Anesthesia Post Evaluation    Patient location during evaluation: bedside  Patient participation: complete - patient participated  Level of consciousness: agitated  Pain score: 0  Pain management: adequate  Airway patency: patent  Cardiovascular status: acceptable  Respiratory status: acceptable  Hydration status: acceptable  Postoperative Nausea and Vomiting: none        No notable events documented.

## 2025-02-03 NOTE — H&P
"History Of Present Illness  Jing Andujar is a 69 y.o. female presenting with abnormal imaging of her pancreas on CT scan.  She had a normal appearing pancreas on CT scan back in February 2014 with a diagnosis of diverticulosis.  Later, a repeat CT for urologic symptoms suggested the PD was dilated and an MRI confirmed it to be 4.3 mm.  She has frequent unformed stools.  She also has discomfort in her RUQ along the right lower ribs.       Past Medical History  Past Medical History:   Diagnosis Date    Asthma     Diverticulosis     GERD (gastroesophageal reflux disease)      Surgical History  Past Surgical History:   Procedure Laterality Date    CHOLECYSTECTOMY      COLONOSCOPY Bilateral     CYST REMOVAL      behind left ear    HERNIA REPAIR      RHINOPLASTY      TONSILLECTOMY      TUBAL LIGATION       Social History  She reports that she quit smoking about 51 years ago. Her smoking use included cigarettes. She has never used smokeless tobacco. She reports that she does not currently use alcohol. No history on file for drug use.    Family History  No family history on file.     Allergies  Allergies   Allergen Reactions    Chlorhexidine Shortness of breath    Levocetirizine Shortness of breath    Adhesive Tape-Silicones Hives    Atorvastatin Nausea/vomiting    Chlordiazepoxide Dizziness    Ciprofloxacin Dizziness    Clidinium Dizziness    Codeine Insomnia    Desvenlafaxine Dizziness    Dexamethasone Unknown    Dicyclomine Dizziness    Ezetimibe GI Upset    Fluconazole Unknown    Guaifenesin Headache    Levofloxacin Other     \"extreme joint pain\"    Metoclopramide Dizziness    Montelukast Unknown    Morphine Headache    Nitrofurantoin Macrocrystal Other, Headache and Nausea/vomiting     \"Numbing of feet\"    Other Hives     Cyclins -- hives    Paxil [Paroxetine Hcl] Unknown    Phenylephrine Headache    Phenylpropanolamine Headache    Procaine Unknown    Pseudoephedrine Unknown    Rosuvastatin Other     \"Body aches\"    " "Saccharomyces Boulardii Nausea/vomiting    Doxycycline Rash    Hyoscyamine Rash    Oxytetracycline Rash    Sulfamethoxazole Rash    Tetracyclines Rash    Trimethoprim Rash     Review of Systems   Constitutional: Negative.         Physical Exam  Constitutional:       Appearance: Normal appearance. She is normal weight.   Cardiovascular:      Rate and Rhythm: Normal rate and regular rhythm.   Pulmonary:      Effort: Pulmonary effort is normal.      Breath sounds: Normal breath sounds.   Abdominal:      Palpations: Abdomen is soft.   Neurological:      Mental Status: She is alert.   Psychiatric:         Mood and Affect: Mood normal.         Behavior: Behavior normal.         Thought Content: Thought content normal.         Judgment: Judgment normal.          Last Recorded Vitals  Blood pressure 126/71, pulse 64, temperature 36.6 °C (97.9 °F), temperature source Temporal, resp. rate 16, height 1.676 m (5' 6\"), weight 65 kg (143 lb 4.8 oz), SpO2 97%.    Assessment/Plan   EUS for PD abnormality  Consider fecal pancreatic elastase to screen for EPI and trial of pancreatic enzyme replacement          Rasheed Cooper, DO  "

## 2025-02-03 NOTE — NURSING NOTE
1058: Patient to bay with anesthesia and surgical team present. Handoff report and plan of care reviewed, all questions answered. VSS.    1110: Patient tolerating sips of water and pudding at this time    1116: Family at bedside    1125: Discharge instructions reviewed with patient and family, no further questions at this time. Printed after visit summary with written instruction provided.    1135: Peripheral IV removed with no complications.     1135: Patient dressed with family assistance.     1150: Patient to main lobby via transport with all belongings in stable condition. Phase 2 complete.

## 2025-02-04 ASSESSMENT — PAIN SCALES - GENERAL: PAINLEVEL_OUTOF10: 0 - NO PAIN

## 2025-03-06 ENCOUNTER — HOSPITAL ENCOUNTER (OUTPATIENT)
Age: 70
Setting detail: SPECIMEN
Discharge: HOME | End: 2025-03-06
Payer: MEDICARE

## 2025-03-06 ENCOUNTER — HOSPITAL ENCOUNTER (OUTPATIENT)
Age: 70
Discharge: HOME | End: 2025-03-06
Payer: MEDICARE

## 2025-03-06 LAB
ALBUMIN SERPL-MCNC: 4.4 G/DL (ref 3.5–5.2)
ALP SERPL-CCNC: 169 U/L (ref 35–104)
ALT SERPL-CCNC: 22 U/L (ref 0–32)
ANION GAP SERPL CALCULATED.3IONS-SCNC: 10 MMOL/L (ref 7–16)
AST SERPL-CCNC: 24 U/L (ref 0–31)
BASOPHILS # BLD: 0.06 K/UL (ref 0–0.2)
BASOPHILS NFR BLD: 1 % (ref 0–2)
BILIRUB SERPL-MCNC: 0.4 MG/DL (ref 0–1.2)
BUN SERPL-MCNC: 14 MG/DL (ref 6–23)
CALCIUM SERPL-MCNC: 9.5 MG/DL (ref 8.6–10.2)
CHLORIDE SERPL-SCNC: 104 MMOL/L (ref 98–107)
CO2 SERPL-SCNC: 24 MMOL/L (ref 22–29)
CREAT SERPL-MCNC: 0.7 MG/DL (ref 0.5–1)
EOSINOPHIL # BLD: 0.08 K/UL (ref 0.05–0.5)
EOSINOPHILS RELATIVE PERCENT: 1 % (ref 0–6)
ERYTHROCYTE [DISTWIDTH] IN BLOOD BY AUTOMATED COUNT: 12.5 % (ref 11.5–15)
GFR, ESTIMATED: >90 ML/MIN/1.73M2
GLUCOSE SERPL-MCNC: 81 MG/DL (ref 74–99)
HCT VFR BLD AUTO: 46.5 % (ref 34–48)
HGB BLD-MCNC: 14.7 G/DL (ref 11.5–15.5)
IMM GRANULOCYTES # BLD AUTO: <0.03 K/UL (ref 0–0.58)
IMM GRANULOCYTES NFR BLD: 0 % (ref 0–5)
LYMPHOCYTES NFR BLD: 1.68 K/UL (ref 1.5–4)
LYMPHOCYTES RELATIVE PERCENT: 29 % (ref 20–42)
MCH RBC QN AUTO: 29 PG (ref 26–35)
MCHC RBC AUTO-ENTMCNC: 31.6 G/DL (ref 32–34.5)
MCV RBC AUTO: 91.7 FL (ref 80–99.9)
MICROORGANISM/AGENT SPEC: NORMAL
MONOCYTES NFR BLD: 0.5 K/UL (ref 0.1–0.95)
MONOCYTES NFR BLD: 9 % (ref 2–12)
NEUTROPHILS NFR BLD: 60 % (ref 43–80)
NEUTS SEG NFR BLD: 3.44 K/UL (ref 1.8–7.3)
PLATELET # BLD AUTO: 254 K/UL (ref 130–450)
PMV BLD AUTO: 9.3 FL (ref 7–12)
POTASSIUM SERPL-SCNC: 4.5 MMOL/L (ref 3.5–5)
PROT SERPL-MCNC: 8.1 G/DL (ref 6.4–8.3)
RBC # BLD AUTO: 5.07 M/UL (ref 3.5–5.5)
SODIUM SERPL-SCNC: 138 MMOL/L (ref 132–146)
SPECIMEN DESCRIPTION: NORMAL
WBC OTHER # BLD: 5.8 K/UL (ref 4.5–11.5)

## 2025-03-06 PROCEDURE — 87324 CLOSTRIDIUM AG IA: CPT

## 2025-03-06 PROCEDURE — 80053 COMPREHEN METABOLIC PANEL: CPT

## 2025-03-06 PROCEDURE — 87205 SMEAR GRAM STAIN: CPT

## 2025-03-06 PROCEDURE — 87449 NOS EACH ORGANISM AG IA: CPT

## 2025-03-06 PROCEDURE — 82653 EL-1 FECAL QUANTITATIVE: CPT

## 2025-03-06 PROCEDURE — 36415 COLL VENOUS BLD VENIPUNCTURE: CPT

## 2025-03-06 PROCEDURE — 87427 SHIGA-LIKE TOXIN AG IA: CPT

## 2025-03-06 PROCEDURE — 87328 CRYPTOSPORIDIUM AG IA: CPT

## 2025-03-06 PROCEDURE — 85025 COMPLETE CBC W/AUTO DIFF WBC: CPT

## 2025-03-06 PROCEDURE — 82710 FATS/LIPIDS FECES QUANT: CPT

## 2025-03-06 PROCEDURE — 82270 OCCULT BLOOD FECES: CPT

## 2025-03-06 PROCEDURE — 83993 ASSAY FOR CALPROTECTIN FECAL: CPT

## 2025-03-06 PROCEDURE — 87046 STOOL CULTR AEROBIC BACT EA: CPT

## 2025-03-06 PROCEDURE — 87045 FECES CULTURE AEROBIC BACT: CPT

## 2025-03-06 PROCEDURE — 87077 CULTURE AEROBIC IDENTIFY: CPT

## 2025-03-07 LAB
C PARVUM AG STL QL IA: NEGATIVE
DATE, STOOL #1: NORMAL
HEMOCCULT SP1 STL QL: NEGATIVE
SOURCE, 60200063: NORMAL
SPECIMEN DESCRIPTION: NORMAL
TIME, STOOL #1: NORMAL

## 2025-03-09 LAB
MICROORGANISM SPEC CULT: NORMAL
MICROORGANISM SPEC CULT: NORMAL
SPECIMEN DESCRIPTION: NORMAL

## 2025-03-11 LAB
FAT QUALITATIVE SPLIT STOOL: NORMAL
FECAL NEUTRAL FAT: NORMAL

## 2025-03-12 LAB
CALPROTECTIN, FECAL: 42 UG/G
FECAL PANCREATIC ELASTASE-1: 279 UG/G

## 2025-03-18 ENCOUNTER — HOSPITAL ENCOUNTER (OUTPATIENT)
Age: 70
Discharge: HOME | End: 2025-03-18
Payer: MEDICARE

## 2025-03-18 LAB
BASOPHILS # BLD: 0.06 K/UL (ref 0–0.2)
BASOPHILS NFR BLD: 1 % (ref 0–2)
EOSINOPHIL # BLD: 0.11 K/UL (ref 0.05–0.5)
EOSINOPHILS RELATIVE PERCENT: 1 % (ref 0–6)
ERYTHROCYTE [DISTWIDTH] IN BLOOD BY AUTOMATED COUNT: 12.7 % (ref 11.5–15)
HCT VFR BLD AUTO: 47.2 % (ref 34–48)
HGB BLD-MCNC: 15.2 G/DL (ref 11.5–15.5)
IMM GRANULOCYTES # BLD AUTO: 0.03 K/UL (ref 0–0.58)
IMM GRANULOCYTES NFR BLD: 0 % (ref 0–5)
LYMPHOCYTES NFR BLD: 2.07 K/UL (ref 1.5–4)
LYMPHOCYTES RELATIVE PERCENT: 26 % (ref 20–42)
MCH RBC QN AUTO: 30.2 PG (ref 26–35)
MCHC RBC AUTO-ENTMCNC: 32.2 G/DL (ref 32–34.5)
MCV RBC AUTO: 93.8 FL (ref 80–99.9)
MONOCYTES NFR BLD: 0.66 K/UL (ref 0.1–0.95)
MONOCYTES NFR BLD: 8 % (ref 2–12)
NEUTROPHILS NFR BLD: 64 % (ref 43–80)
NEUTS SEG NFR BLD: 5.13 K/UL (ref 1.8–7.3)
PLATELET # BLD AUTO: 271 K/UL (ref 130–450)
PMV BLD AUTO: 9.1 FL (ref 7–12)
RBC # BLD AUTO: 5.03 M/UL (ref 3.5–5.5)
WBC OTHER # BLD: 8.1 K/UL (ref 4.5–11.5)

## 2025-03-18 PROCEDURE — 87389 HIV-1 AG W/HIV-1&-2 AB AG IA: CPT

## 2025-03-18 PROCEDURE — 85025 COMPLETE CBC W/AUTO DIFF WBC: CPT

## 2025-03-18 PROCEDURE — 82787 IGG 1 2 3 OR 4 EACH: CPT

## 2025-03-18 PROCEDURE — 82785 ASSAY OF IGE: CPT

## 2025-03-18 PROCEDURE — 82784 ASSAY IGA/IGD/IGG/IGM EACH: CPT

## 2025-03-18 PROCEDURE — 36415 COLL VENOUS BLD VENIPUNCTURE: CPT

## 2025-03-19 LAB
HIV 1+2 AB+HIV1 P24 AG SERPL QL IA: NONREACTIVE
IGG SERPL-MCNC: 1016 MG/DL (ref 700–1600)

## 2025-03-20 LAB
IGA 1: 283 MG/DL (ref 60–294)
IGA 2: 95 MG/DL (ref 6–61)
IGA, TOTAL: 349 MG/DL (ref 68–408)
IGE SERPL-ACNC: 23 IU/ML (ref 0–100)
IGG 1: 481 MG/DL (ref 240–1118)
IGG 2: 443 MG/DL (ref 124–549)
IGG 3: 56 MG/DL (ref 21–134)
IGG4 SER-MCNC: 46 MG/DL (ref 1–123)

## 2025-07-08 ENCOUNTER — APPOINTMENT (OUTPATIENT)
Dept: GASTROENTEROLOGY | Facility: CLINIC | Age: 70
End: 2025-07-08
Payer: MEDICARE